# Patient Record
Sex: FEMALE | Race: WHITE | NOT HISPANIC OR LATINO | Employment: FULL TIME | URBAN - METROPOLITAN AREA
[De-identification: names, ages, dates, MRNs, and addresses within clinical notes are randomized per-mention and may not be internally consistent; named-entity substitution may affect disease eponyms.]

---

## 2021-07-29 ENCOUNTER — OFFICE VISIT (OUTPATIENT)
Dept: GASTROENTEROLOGY | Facility: CLINIC | Age: 31
End: 2021-07-29

## 2021-07-29 ENCOUNTER — LAB (OUTPATIENT)
Dept: LAB | Facility: HOSPITAL | Age: 31
End: 2021-07-29

## 2021-07-29 VITALS
HEIGHT: 64 IN | TEMPERATURE: 97.3 F | SYSTOLIC BLOOD PRESSURE: 126 MMHG | BODY MASS INDEX: 37.59 KG/M2 | DIASTOLIC BLOOD PRESSURE: 68 MMHG | HEART RATE: 99 BPM | WEIGHT: 220.2 LBS | OXYGEN SATURATION: 98 %

## 2021-07-29 DIAGNOSIS — K62.89 CHRONIC IDIOPATHIC ANAL PAIN: ICD-10-CM

## 2021-07-29 DIAGNOSIS — Z98.890 HISTORY OF BOWEL DIVERSION SURGERY: ICD-10-CM

## 2021-07-29 DIAGNOSIS — K52.9 CHRONIC DIARRHEA: ICD-10-CM

## 2021-07-29 DIAGNOSIS — Z98.890 PERSONAL HISTORY OF SURGERY TO HEART AND GREAT VESSELS, PRESENTING HAZARDS TO HEALTH: ICD-10-CM

## 2021-07-29 DIAGNOSIS — G89.29 CHRONIC IDIOPATHIC ANAL PAIN: ICD-10-CM

## 2021-07-29 DIAGNOSIS — R10.9 CHRONIC ABDOMINAL PAIN: ICD-10-CM

## 2021-07-29 DIAGNOSIS — G89.29 CHRONIC ABDOMINAL PAIN: ICD-10-CM

## 2021-07-29 DIAGNOSIS — R10.9 CHRONIC ABDOMINAL PAIN: Primary | ICD-10-CM

## 2021-07-29 DIAGNOSIS — G89.29 CHRONIC ABDOMINAL PAIN: Primary | ICD-10-CM

## 2021-07-29 DIAGNOSIS — R10.9 STOMACH ACHE: Primary | ICD-10-CM

## 2021-07-29 LAB
ALBUMIN SERPL-MCNC: 4.6 G/DL (ref 3.5–5.2)
ALBUMIN/GLOB SERPL: 1.3 G/DL
ALP SERPL-CCNC: 101 U/L (ref 39–117)
ALT SERPL W P-5'-P-CCNC: 11 U/L (ref 1–33)
ANION GAP SERPL CALCULATED.3IONS-SCNC: 9.1 MMOL/L (ref 5–15)
AST SERPL-CCNC: 12 U/L (ref 1–32)
BASOPHILS # BLD AUTO: 0.06 10*3/MM3 (ref 0–0.2)
BASOPHILS NFR BLD AUTO: 0.6 % (ref 0–1.5)
BILIRUB SERPL-MCNC: 0.4 MG/DL (ref 0–1.2)
BUN SERPL-MCNC: 9 MG/DL (ref 6–20)
BUN/CREAT SERPL: 9.3 (ref 7–25)
CALCIUM SPEC-SCNC: 9.2 MG/DL (ref 8.6–10.5)
CHLORIDE SERPL-SCNC: 107 MMOL/L (ref 98–107)
CO2 SERPL-SCNC: 22.9 MMOL/L (ref 22–29)
CREAT SERPL-MCNC: 0.97 MG/DL (ref 0.57–1)
CRP SERPL-MCNC: 0.37 MG/DL (ref 0–0.5)
DEPRECATED RDW RBC AUTO: 39.4 FL (ref 37–54)
EOSINOPHIL # BLD AUTO: 0.15 10*3/MM3 (ref 0–0.4)
EOSINOPHIL NFR BLD AUTO: 1.5 % (ref 0.3–6.2)
ERYTHROCYTE [DISTWIDTH] IN BLOOD BY AUTOMATED COUNT: 12 % (ref 12.3–15.4)
GFR SERPL CREATININE-BSD FRML MDRD: 67 ML/MIN/1.73
GLOBULIN UR ELPH-MCNC: 3.5 GM/DL
GLUCOSE SERPL-MCNC: 80 MG/DL (ref 65–99)
HBV SURFACE AG SERPL QL IA: NORMAL
HCT VFR BLD AUTO: 49.4 % (ref 34–46.6)
HGB BLD-MCNC: 16.3 G/DL (ref 12–15.9)
IMM GRANULOCYTES # BLD AUTO: 0.03 10*3/MM3 (ref 0–0.05)
IMM GRANULOCYTES NFR BLD AUTO: 0.3 % (ref 0–0.5)
LYMPHOCYTES # BLD AUTO: 2.99 10*3/MM3 (ref 0.7–3.1)
LYMPHOCYTES NFR BLD AUTO: 30.9 % (ref 19.6–45.3)
MCH RBC QN AUTO: 29.6 PG (ref 26.6–33)
MCHC RBC AUTO-ENTMCNC: 33 G/DL (ref 31.5–35.7)
MCV RBC AUTO: 89.8 FL (ref 79–97)
MONOCYTES # BLD AUTO: 0.52 10*3/MM3 (ref 0.1–0.9)
MONOCYTES NFR BLD AUTO: 5.4 % (ref 5–12)
NEUTROPHILS NFR BLD AUTO: 5.93 10*3/MM3 (ref 1.7–7)
NEUTROPHILS NFR BLD AUTO: 61.3 % (ref 42.7–76)
NRBC BLD AUTO-RTO: 0 /100 WBC (ref 0–0.2)
PLATELET # BLD AUTO: 328 10*3/MM3 (ref 140–450)
PMV BLD AUTO: 9.8 FL (ref 6–12)
POTASSIUM SERPL-SCNC: 3.9 MMOL/L (ref 3.5–5.2)
PROT SERPL-MCNC: 8.1 G/DL (ref 6–8.5)
RBC # BLD AUTO: 5.5 10*6/MM3 (ref 3.77–5.28)
SODIUM SERPL-SCNC: 139 MMOL/L (ref 136–145)
WBC # BLD AUTO: 9.68 10*3/MM3 (ref 3.4–10.8)

## 2021-07-29 PROCEDURE — 86682 HELMINTH ANTIBODY: CPT

## 2021-07-29 PROCEDURE — 86140 C-REACTIVE PROTEIN: CPT

## 2021-07-29 PROCEDURE — 85025 COMPLETE CBC W/AUTO DIFF WBC: CPT

## 2021-07-29 PROCEDURE — 86255 FLUORESCENT ANTIBODY SCREEN: CPT

## 2021-07-29 PROCEDURE — 83516 IMMUNOASSAY NONANTIBODY: CPT

## 2021-07-29 PROCEDURE — 82607 VITAMIN B-12: CPT

## 2021-07-29 PROCEDURE — 87340 HEPATITIS B SURFACE AG IA: CPT

## 2021-07-29 PROCEDURE — 99204 OFFICE O/P NEW MOD 45 MIN: CPT | Performed by: INTERNAL MEDICINE

## 2021-07-29 PROCEDURE — 87385 HISTOPLASMA CAPSUL AG IA: CPT

## 2021-07-29 PROCEDURE — 86480 TB TEST CELL IMMUN MEASURE: CPT

## 2021-07-29 PROCEDURE — 86671 FUNGUS NES ANTIBODY: CPT

## 2021-07-29 PROCEDURE — 36415 COLL VENOUS BLD VENIPUNCTURE: CPT

## 2021-07-29 PROCEDURE — 86704 HEP B CORE ANTIBODY TOTAL: CPT

## 2021-07-29 PROCEDURE — 81335 TPMT GENE COM VARIANTS: CPT

## 2021-07-29 PROCEDURE — 80053 COMPREHEN METABOLIC PANEL: CPT

## 2021-07-29 RX ORDER — PRENATAL VIT NO.126/IRON/FOLIC 28MG-0.8MG
TABLET ORAL DAILY
COMMUNITY

## 2021-07-29 NOTE — PROGRESS NOTES
PCP: Sybil Watters MD    Chief Complaint   Patient presents with   • Abdominal Pain     Lower Abd Pain   • Nausea   • Diarrhea        History of Present Illness:   Corrine Kaplan is a 30 y.o. female who presents to the GI clinic as new patient self referral for GI symptoms. She has a h/o ileocectomy in [place] Prior to 2016 (surgeon has since passed away). She was told the pathology was suggestive for crohn's disease.  She followed with dr. Rogers and had an egd/colonoscopy around 2017 showing normal post operative anatomy.    She reports worsening epigastric abdominal pain more with size/consistency of foods.  She tolerates protein shakes.  She does have nausea. Minimal wt loss but wt has been fluctuating related to pregnancy.  No gib loss. Has post prandial liquid stools.  No fever. No h/o malignancy. Has three children.     Past Medical History:   Diagnosis Date   • Colon polyp        Past Surgical History:   Procedure Laterality Date   • APPENDECTOMY     • CHOLECYSTECTOMY     • COLONOSCOPY           Current Outpatient Medications:   •  prenatal vitamin (prenatal, CLASSIC, vitamin) tablet, Take  by mouth Daily., Disp: , Rfl:     No Known Allergies    Family History   Problem Relation Age of Onset   • Colon cancer Neg Hx    • Colon polyps Neg Hx    • Esophageal cancer Neg Hx        Social History     Socioeconomic History   • Marital status: Single     Spouse name: Not on file   • Number of children: Not on file   • Years of education: Not on file   • Highest education level: Not on file   Tobacco Use   • Smoking status: Never Smoker   • Smokeless tobacco: Never Used   Vaping Use   • Vaping Use: Never used   Substance and Sexual Activity   • Alcohol use: Yes       Review of Systems  All other systems reviewed and are negative.  A complete 12 point ros was asked and is negative except for that mentioned above.  In particular:  No fever  No rash  No increased arthralgias  No worsening edema  No cough  No  dyspnea  No chest pain    Vitals:    07/29/21 1546   BP: 126/68   Pulse: 99   Temp: 97.3 °F (36.3 °C)   SpO2: 98%       Physical Exam  General Appearance:  Vitals as above. no acute distress  Head/face:  Normocephalic, atraumatic  Eyes:   EOMI, no conjunctivitis or icterus   Nose/Sinuses:  Nares patent bilaterally without discharge or lesions  Mouth/Throat:  Normal oral movements without dyskinesia  Neck:  trachea is midline, no thyromegaly  Lungs:  Normal work of breathing effort, no overt rales  Heart:  No overt JVD or type VI murmur  Abdomen:  Nondistended, no guarding or rebound tenderness  Neurologic:  Alert; no focal deficits; age appropriate behavior and speech  Psychiatric: mood and affect are congruent  Vascular: extremities without edema  Skin: no rash or cyanosis.  High bmi    Assessment/Plan  1.) History of small bowel resection, suspected crohn's disease  Dr. Christensen(? Spelling) at [facility].    Would like to acquire pathology report    2.) Chronic post prandial abdominal pain, consistency based  Consistency of food hints at possible obstructive symptoms, differential includes motility disturbance/ibs    Plan:  MREnterography to rule out small bowel stricture/anastomosis stricture    3.) Chronic diarrhea  Recommend egd/colonoscopy and cbc, cmp, crp as well as crohn's serology  Differential includes active IBD vs IBS/functional.      Santy Estrella MD  7/29/2021

## 2021-07-30 LAB — VIT B12 BLD-MCNC: 340 PG/ML (ref 211–946)

## 2021-07-31 LAB — HBV CORE AB SERPL QL IA: NEGATIVE

## 2021-08-02 LAB
BAKER'S YEAST IGG QN IA: 15 UNITS (ref 0–50)
CHITOBIOSIDE IGA SERPL IA-ACNC: 7 UNITS (ref 0–90)
GAMMA INTERFERON BACKGROUND BLD IA-ACNC: 0.16 IU/ML
H CAPSUL AG UR QL IA: <0.5
LABORATORY COMMENT REPORT: NORMAL
LAMINARIBIOSIDE IGG SERPL IA-ACNC: 23 UNITS (ref 0–60)
Lab: NORMAL
M TB IFN-G BLD-IMP: NEGATIVE
M TB IFN-G CD4+ BCKGRND COR BLD-ACNC: 0.13 IU/ML
M TB IFN-G CD4+CD8+ BCKGRND COR BLD-ACNC: 0.16 IU/ML
MANNOBIOSIDE IGG SERPL IA-ACNC: 53 UNITS (ref 0–100)
MITOGEN IGNF BLD-ACNC: >10 IU/ML
P-ANCA ATYPICAL SER QL IF: NEGATIVE
SERVICE CMNT-IMP: NORMAL

## 2021-08-03 LAB — STRONGYLOIDES IGG SER QL IA: NEGATIVE

## 2021-08-04 LAB
DIAGNOSTIC IMP SPEC-IMP: NORMAL
LABORATORY COMMENT REPORT: NORMAL
TPMT GENE MUT ANL BLD/T: NORMAL
TPMT GENE MUT ANL BLD/T: NORMAL

## 2021-08-23 DIAGNOSIS — Z12.11 ENCOUNTER FOR SCREENING COLONOSCOPY: Primary | ICD-10-CM

## 2021-08-30 ENCOUNTER — APPOINTMENT (OUTPATIENT)
Dept: PREADMISSION TESTING | Facility: HOSPITAL | Age: 31
End: 2021-08-30

## 2021-08-30 LAB — SARS-COV-2 RNA PNL SPEC NAA+PROBE: NOT DETECTED

## 2021-08-30 PROCEDURE — C9803 HOPD COVID-19 SPEC COLLECT: HCPCS

## 2021-08-30 PROCEDURE — U0004 COV-19 TEST NON-CDC HGH THRU: HCPCS

## 2021-09-02 ENCOUNTER — OUTSIDE FACILITY SERVICE (OUTPATIENT)
Dept: GASTROENTEROLOGY | Facility: CLINIC | Age: 31
End: 2021-09-02

## 2021-09-02 PROCEDURE — 43239 EGD BIOPSY SINGLE/MULTIPLE: CPT | Performed by: INTERNAL MEDICINE

## 2021-09-02 PROCEDURE — 45380 COLONOSCOPY AND BIOPSY: CPT | Performed by: INTERNAL MEDICINE

## 2021-09-02 PROCEDURE — 88305 TISSUE EXAM BY PATHOLOGIST: CPT | Performed by: INTERNAL MEDICINE

## 2021-09-02 RX ORDER — PANTOPRAZOLE SODIUM 40 MG/1
40 TABLET, DELAYED RELEASE ORAL DAILY
Qty: 90 TABLET | Refills: 3 | Status: SHIPPED | OUTPATIENT
Start: 2021-09-02 | End: 2022-09-27

## 2021-09-02 RX ORDER — BUDESONIDE 3 MG/1
9 CAPSULE, COATED PELLETS ORAL DAILY
Qty: 90 CAPSULE | Refills: 3 | Status: SHIPPED | OUTPATIENT
Start: 2021-09-02 | End: 2021-10-19

## 2021-09-03 ENCOUNTER — LAB REQUISITION (OUTPATIENT)
Dept: LAB | Facility: HOSPITAL | Age: 31
End: 2021-09-03

## 2021-09-03 DIAGNOSIS — K63.89 OTHER SPECIFIED DISEASES OF INTESTINE: ICD-10-CM

## 2021-09-03 DIAGNOSIS — R19.7 DIARRHEA, UNSPECIFIED: ICD-10-CM

## 2021-09-03 DIAGNOSIS — R10.9 UNSPECIFIED ABDOMINAL PAIN: ICD-10-CM

## 2021-09-03 DIAGNOSIS — K64.8 OTHER HEMORRHOIDS: ICD-10-CM

## 2021-09-07 LAB
CYTO UR: NORMAL
LAB AP CASE REPORT: NORMAL
LAB AP CLINICAL INFORMATION: NORMAL
PATH REPORT.FINAL DX SPEC: NORMAL
PATH REPORT.GROSS SPEC: NORMAL

## 2021-09-08 ENCOUNTER — TELEPHONE (OUTPATIENT)
Dept: GASTROENTEROLOGY | Facility: CLINIC | Age: 31
End: 2021-09-08

## 2021-09-08 NOTE — TELEPHONE ENCOUNTER
Ms Eaton called back. Biopsy results given. Bradly will reschedule follow up for 8 weeks for colonoscopy results out from 9/8/2021. Cancelled 9/23/2021 with Dr Estrella.

## 2021-10-05 ENCOUNTER — HOSPITAL ENCOUNTER (OUTPATIENT)
Dept: MRI IMAGING | Facility: HOSPITAL | Age: 31
Discharge: HOME | End: 2021-10-05
Admitting: INTERNAL MEDICINE

## 2021-10-05 DIAGNOSIS — K52.9 CHRONIC DIARRHEA: ICD-10-CM

## 2021-10-05 DIAGNOSIS — R10.9 CHRONIC ABDOMINAL PAIN: ICD-10-CM

## 2021-10-05 DIAGNOSIS — Z98.890 HISTORY OF BOWEL DIVERSION SURGERY: ICD-10-CM

## 2021-10-05 DIAGNOSIS — G89.29 CHRONIC ABDOMINAL PAIN: ICD-10-CM

## 2021-10-05 PROCEDURE — 74183 MRI ABD W/O CNTR FLWD CNTR: CPT

## 2021-10-05 PROCEDURE — 0 GADOBENATE DIMEGLUMINE 529 MG/ML SOLUTION: Performed by: INTERNAL MEDICINE

## 2021-10-05 PROCEDURE — A9577 INJ MULTIHANCE: HCPCS | Performed by: INTERNAL MEDICINE

## 2021-10-05 PROCEDURE — 72197 MRI PELVIS W/O & W/DYE: CPT

## 2021-10-05 PROCEDURE — 25010000002 GLUCAGON (HUMAN RECOMBINANT) 1 MG RECONSTITUTED SOLUTION: Performed by: INTERNAL MEDICINE

## 2021-10-05 RX ADMIN — GLUCAGON HYDROCHLORIDE 2 MG: 1 INJECTION, POWDER, FOR SOLUTION INTRAMUSCULAR; INTRAVENOUS; SUBCUTANEOUS at 10:07

## 2021-10-05 RX ADMIN — GADOBENATE DIMEGLUMINE 20 ML: 529 INJECTION, SOLUTION INTRAVENOUS at 11:14

## 2021-10-19 ENCOUNTER — OFFICE VISIT (OUTPATIENT)
Dept: GASTROENTEROLOGY | Facility: CLINIC | Age: 31
End: 2021-10-19

## 2021-10-19 VITALS
OXYGEN SATURATION: 99 % | TEMPERATURE: 97.8 F | SYSTOLIC BLOOD PRESSURE: 134 MMHG | WEIGHT: 220 LBS | BODY MASS INDEX: 37.56 KG/M2 | DIASTOLIC BLOOD PRESSURE: 76 MMHG | HEART RATE: 86 BPM | HEIGHT: 64 IN

## 2021-10-19 DIAGNOSIS — K52.9 CHRONIC DIARRHEA: Primary | ICD-10-CM

## 2021-10-19 PROCEDURE — 99214 OFFICE O/P EST MOD 30 MIN: CPT | Performed by: INTERNAL MEDICINE

## 2021-10-19 RX ORDER — MESALAMINE 0.38 G/1
1.5 CAPSULE, EXTENDED RELEASE ORAL DAILY
Qty: 180 CAPSULE | Refills: 3 | Status: SHIPPED | OUTPATIENT
Start: 2021-10-19 | End: 2022-07-08

## 2021-10-19 RX ORDER — VORTIOXETINE 10 MG/1
TABLET, FILM COATED ORAL
COMMUNITY
Start: 2021-10-15

## 2021-10-19 RX ORDER — CITALOPRAM 20 MG/1
TABLET ORAL
COMMUNITY
Start: 2021-09-23

## 2021-10-19 RX ORDER — DICYCLOMINE HYDROCHLORIDE 10 MG/1
10 CAPSULE ORAL 3 TIMES DAILY PRN
Qty: 180 CAPSULE | Refills: 3 | Status: SHIPPED | OUTPATIENT
Start: 2021-10-19

## 2021-10-19 NOTE — PROGRESS NOTES
PCP: Isela Dias MD    Chief Complaint   Patient presents with   • Follow-up     follow up on EGD and Colonoscopy   • Nausea   • Abdominal Pain       History of Present Illness:   Corrine Eaton is a 30 y.o. female who presents to GI clinic as a follow up for suspected crohn's disease. H/o right hemicolectomy by retired physician with inability to acquire pathology. Reported inflammatory mass. She did have 6 apthous ulcers but does not feel better on budesonide. Denies taking ibuprofen or other nsaids.  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 Stress possibly contributing. Having reversion of diarrhea multilple times a day to maybe 1 x a week.  No gib loss.    Past Medical History:   Diagnosis Date   • Colon polyp        Past Surgical History:   Procedure Laterality Date   • APPENDECTOMY     • CHOLECYSTECTOMY     • COLONOSCOPY           Current Outpatient Medications:   •  Budesonide (ENTOCORT EC) 3 MG 24 hr capsule, Take 3 capsules by mouth Daily for 90 days., Disp: 90 capsule, Rfl: 3  •  citalopram (CeleXA) 20 MG tablet, , Disp: , Rfl:   •  pantoprazole (PROTONIX) 40 MG EC tablet, Take 1 tablet by mouth Daily., Disp: 90 tablet, Rfl: 3  •  prenatal vitamin (prenatal, CLASSIC, vitamin) tablet, Take  by mouth Daily., Disp: , Rfl:   •  Trintellix 10 MG tablet, , Disp: , Rfl:     No Known Allergies    Family History   Problem Relation Age of Onset   • Colon cancer Neg Hx    • Colon polyps Neg Hx    • Esophageal cancer Neg Hx        Social History     Socioeconomic History   • Marital status: Single   Tobacco Use   • Smoking status: Never Smoker   • Smokeless tobacco: Never Used   Vaping Use   • Vaping Use: Never used   Substance and Sexual Activity   • Alcohol use: Yes       Review of Systems  A complete 12 point ros was asked and is negative except for that mentioned above.  In particular:  No fever  No rash  No increased arthralgias  No worsening edema  No cough  No dyspnea  No chest pain      Vitals:    10/19/21 1316   BP:  134/76   Pulse: 86   Temp: 97.8 °F (36.6 °C)   SpO2: 99%       Physical Exam  General: well developed, well nourished  A+O x 3 NAD  HEENT: NCAT, pupils equal appearing, sclera appear white  NECK: full ROM  Respiratory: symmetric chest rise, normal effort, normal work of breathing, no overt rales  Abomen: non-distended  Skin: normal color, no jaundice  Neuro: no tremor, no facial droop  Psych: normal mood and affect      Assessment/Plan  1.) Suspect Crohn's disease  2.) Chronic diarrhea  3.) Chronic abdominal pain  Workup: colonoscopy 9/2021 with 6 apthous ulcers, MRE normal 2021 without small bowel disease  Cbc, cmp crp: wnl.  H/o surgical extraction of suspected inflammatory crohn's.  No nsaid use    Plan:  Budesonide has not helped. Getting clues of both inflammatory and irritability. Will change budesonide to mesalamine and she was educated on risk of AIN.  Will assess anti tnf and renal labs in 2 weeks. Will assess stool for inflammation and place on bentyl qid x 2 weeks then prn.  If persistent discomfort and labs favoring inflammation, may consider stepping up to remicade.       Santy Estrella MD  10/19/2021

## 2021-11-29 RX ORDER — BUDESONIDE 3 MG/1
9 CAPSULE, COATED PELLETS ORAL DAILY
Qty: 270 CAPSULE | Refills: 1 | OUTPATIENT
Start: 2021-11-29 | End: 2022-02-27

## 2021-11-30 ENCOUNTER — TELEPHONE (OUTPATIENT)
Dept: GASTROENTEROLOGY | Facility: CLINIC | Age: 31
End: 2021-11-30

## 2021-11-30 DIAGNOSIS — K52.9 CHRONIC DIARRHEA: Primary | ICD-10-CM

## 2021-11-30 RX ORDER — METRONIDAZOLE 500 MG/1
500 TABLET ORAL 3 TIMES DAILY
Qty: 30 TABLET | Refills: 0 | Status: SHIPPED | OUTPATIENT
Start: 2021-11-30 | End: 2022-06-30

## 2021-12-22 ENCOUNTER — PRIOR AUTHORIZATION (OUTPATIENT)
Dept: GASTROENTEROLOGY | Facility: CLINIC | Age: 31
End: 2021-12-22

## 2021-12-22 ENCOUNTER — LAB (OUTPATIENT)
Dept: LAB | Facility: HOSPITAL | Age: 31
End: 2021-12-22

## 2021-12-22 DIAGNOSIS — K52.9 CHRONIC DIARRHEA: ICD-10-CM

## 2021-12-22 PROCEDURE — 83993 ASSAY FOR CALPROTECTIN FECAL: CPT

## 2021-12-22 PROCEDURE — 0097U HC BIOFIRE FILMARRAY GI PANEL: CPT

## 2021-12-23 ENCOUNTER — TELEPHONE (OUTPATIENT)
Dept: GASTROENTEROLOGY | Facility: CLINIC | Age: 31
End: 2021-12-23

## 2021-12-23 LAB
ADV 40+41 DNA STL QL NAA+NON-PROBE: NOT DETECTED
ASTRO TYP 1-8 RNA STL QL NAA+NON-PROBE: NOT DETECTED
C CAYETANENSIS DNA STL QL NAA+NON-PROBE: NOT DETECTED
C COLI+JEJ+UPSA DNA STL QL NAA+NON-PROBE: NOT DETECTED
CRYPTOSP DNA STL QL NAA+NON-PROBE: NOT DETECTED
E HISTOLYT DNA STL QL NAA+NON-PROBE: NOT DETECTED
EAEC PAA PLAS AGGR+AATA ST NAA+NON-PRB: NOT DETECTED
EC STX1+STX2 GENES STL QL NAA+NON-PROBE: NOT DETECTED
EPEC EAE GENE STL QL NAA+NON-PROBE: NOT DETECTED
ETEC LTA+ST1A+ST1B TOX ST NAA+NON-PROBE: NOT DETECTED
G LAMBLIA DNA STL QL NAA+NON-PROBE: NOT DETECTED
NOROVIRUS GI+II RNA STL QL NAA+NON-PROBE: NOT DETECTED
P SHIGELLOIDES DNA STL QL NAA+NON-PROBE: NOT DETECTED
RVA RNA STL QL NAA+NON-PROBE: DETECTED
S ENT+BONG DNA STL QL NAA+NON-PROBE: NOT DETECTED
SAPO I+II+IV+V RNA STL QL NAA+NON-PROBE: NOT DETECTED
SHIGELLA SP+EIEC IPAH ST NAA+NON-PROBE: NOT DETECTED
V CHOL+PARA+VUL DNA STL QL NAA+NON-PROBE: NOT DETECTED
V CHOLERAE DNA STL QL NAA+NON-PROBE: NOT DETECTED
Y ENTEROCOL DNA STL QL NAA+NON-PROBE: NOT DETECTED

## 2021-12-28 LAB — CALPROTECTIN STL-MCNT: <16 UG/G (ref 0–120)

## 2022-01-18 ENCOUNTER — OFFICE VISIT (OUTPATIENT)
Dept: GASTROENTEROLOGY | Facility: CLINIC | Age: 32
End: 2022-01-18

## 2022-01-18 VITALS
TEMPERATURE: 98 F | HEIGHT: 64 IN | BODY MASS INDEX: 38.28 KG/M2 | DIASTOLIC BLOOD PRESSURE: 62 MMHG | WEIGHT: 224.2 LBS | SYSTOLIC BLOOD PRESSURE: 122 MMHG | OXYGEN SATURATION: 98 % | HEART RATE: 92 BPM

## 2022-01-18 DIAGNOSIS — R10.9 CHRONIC ABDOMINAL PAIN: ICD-10-CM

## 2022-01-18 DIAGNOSIS — Z12.11 ENCOUNTER FOR SCREENING COLONOSCOPY: Primary | ICD-10-CM

## 2022-01-18 DIAGNOSIS — G89.29 CHRONIC ABDOMINAL PAIN: ICD-10-CM

## 2022-01-18 PROBLEM — F33.1 MODERATE EPISODE OF RECURRENT MAJOR DEPRESSIVE DISORDER: Status: ACTIVE | Noted: 2021-10-05

## 2022-01-18 PROCEDURE — 99214 OFFICE O/P EST MOD 30 MIN: CPT | Performed by: INTERNAL MEDICINE

## 2022-01-18 NOTE — PROGRESS NOTES
PCP: Isela Dias MD    Chief Complaint   Patient presents with   • Follow-up     Chronic Diarrhea        History of Present Illness:   Corrine Eaton is a 31 y.o. female who presents to GI clinic as a follow up for ibs and suspected crohn's disease. Since last visit, she was placed on bentyl prn, mesalamine and an MRE was performed along with stool studies. Stool studies + rotavirus, mre without small bowel stricture, mild rectal wall thickening.  She states she had less abdominal cramping on low fodmap diet but more liquid stool. She would typically not have a bm every day on regular diet.  No gib loss. No fever    Past Medical History:   Diagnosis Date   • Colon polyp        Past Surgical History:   Procedure Laterality Date   • APPENDECTOMY     • CHOLECYSTECTOMY     • COLONOSCOPY           Current Outpatient Medications:   •  mesalamine (APRISO) 0.375 g 24 hr capsule, Take 4 capsules by mouth Daily., Disp: 180 capsule, Rfl: 3  •  pantoprazole (PROTONIX) 40 MG EC tablet, Take 1 tablet by mouth Daily., Disp: 90 tablet, Rfl: 3  •  Trintellix 10 MG tablet, , Disp: , Rfl:   •  citalopram (CeleXA) 20 MG tablet, , Disp: , Rfl:   •  dicyclomine (Bentyl) 10 MG capsule, Take 1 capsule by mouth 3 (Three) Times a Day As Needed (abdominal pain)., Disp: 180 capsule, Rfl: 3  •  metroNIDAZOLE (FLAGYL) 500 MG tablet, Take 1 tablet by mouth 3 (Three) Times a Day., Disp: 30 tablet, Rfl: 0  •  prenatal vitamin (prenatal, CLASSIC, vitamin) tablet, Take  by mouth Daily., Disp: , Rfl:     No Known Allergies    Family History   Problem Relation Age of Onset   • Colon cancer Neg Hx    • Colon polyps Neg Hx    • Esophageal cancer Neg Hx        Social History     Socioeconomic History   • Marital status: Single   Tobacco Use   • Smoking status: Never Smoker   • Smokeless tobacco: Never Used   Vaping Use   • Vaping Use: Never used   Substance and Sexual Activity   • Alcohol use: Yes       Review of Systems  A complete 12 point ros was  asked and is negative except for that mentioned above.  In particular:  No fever  No rash  No increased arthralgias  No worsening edema  No cough  No dyspnea  No chest pain      Vitals:    01/18/22 1503   BP: 122/62   Pulse: 92   Temp: 98 °F (36.7 °C)   SpO2: 98%       Physical Exam  General: well developed, well nourished  A+O x 3 NAD  HEENT: NCAT, pupils equal appearing, sclera appear white  NECK: full ROM  Respiratory: symmetric chest rise, normal effort, normal work of breathing, no overt rales  Abomen: non-distended  Skin: normal color, no jaundice  Neuro: no tremor, no facial droop  Psych: normal mood and affect      Assessment/Plan  1.) Suspect Crohn's disease  2.) Chronic diarrhea  3.) Chronic abdominal pain  Workup: colonoscopy 9/2021 with 6 apthous ulcers, MRE normal 2021 without small bowel disease  Cbc, cmp crp: wnl.  H/o surgical extraction of suspected inflammatory crohn's.  No nsaid use  - low fodmap diet helped crampy pain but she had more liquid bms  - has periods of no bm on regular diet    Plan:  -discontinue bentyl  - go back to regular diet  - not enough inflammatory data to warrant step up therapy and I suspect more IBS at present. Will send bowel prep and start daily miralax titration. She voiced understanding.    Santy Estrella MD  1/18/2022

## 2022-03-14 ENCOUNTER — TELEPHONE (OUTPATIENT)
Dept: GASTROENTEROLOGY | Facility: CLINIC | Age: 32
End: 2022-03-14

## 2022-03-14 ENCOUNTER — APPOINTMENT (OUTPATIENT)
Dept: CT IMAGING | Facility: HOSPITAL | Age: 32
End: 2022-03-14

## 2022-03-14 ENCOUNTER — HOSPITAL ENCOUNTER (EMERGENCY)
Facility: HOSPITAL | Age: 32
Discharge: HOME | End: 2022-03-14
Attending: STUDENT IN AN ORGANIZED HEALTH CARE EDUCATION/TRAINING PROGRAM | Admitting: STUDENT IN AN ORGANIZED HEALTH CARE EDUCATION/TRAINING PROGRAM

## 2022-03-14 VITALS
RESPIRATION RATE: 18 BRPM | SYSTOLIC BLOOD PRESSURE: 129 MMHG | DIASTOLIC BLOOD PRESSURE: 96 MMHG | HEIGHT: 64 IN | HEART RATE: 107 BPM | WEIGHT: 220 LBS | TEMPERATURE: 97.8 F | BODY MASS INDEX: 37.56 KG/M2 | OXYGEN SATURATION: 100 %

## 2022-03-14 DIAGNOSIS — R19.7 DIARRHEA, UNSPECIFIED TYPE: ICD-10-CM

## 2022-03-14 DIAGNOSIS — R10.9 NONSPECIFIC ABDOMINAL PAIN: Primary | ICD-10-CM

## 2022-03-14 DIAGNOSIS — D72.829 LEUKOCYTOSIS, UNSPECIFIED TYPE: ICD-10-CM

## 2022-03-14 DIAGNOSIS — R11.2 NON-INTRACTABLE VOMITING WITH NAUSEA, UNSPECIFIED VOMITING TYPE: ICD-10-CM

## 2022-03-14 DIAGNOSIS — K52.9 GASTROENTERITIS: ICD-10-CM

## 2022-03-14 LAB
ALBUMIN SERPL-MCNC: 4.5 G/DL (ref 3.5–5.2)
ALBUMIN/GLOB SERPL: 1.4 G/DL
ALP SERPL-CCNC: 84 U/L (ref 39–117)
ALT SERPL W P-5'-P-CCNC: 10 U/L (ref 1–33)
ANION GAP SERPL CALCULATED.3IONS-SCNC: 12 MMOL/L (ref 5–15)
AST SERPL-CCNC: 14 U/L (ref 1–32)
BACTERIA UR QL AUTO: ABNORMAL /HPF
BASOPHILS # BLD AUTO: 0.07 10*3/MM3 (ref 0–0.2)
BASOPHILS NFR BLD AUTO: 0.4 % (ref 0–1.5)
BILIRUB SERPL-MCNC: 0.4 MG/DL (ref 0–1.2)
BILIRUB UR QL STRIP: NEGATIVE
BUN SERPL-MCNC: 15 MG/DL (ref 6–20)
BUN/CREAT SERPL: 16.7 (ref 7–25)
CALCIUM SPEC-SCNC: 9.1 MG/DL (ref 8.6–10.5)
CHLORIDE SERPL-SCNC: 105 MMOL/L (ref 98–107)
CLARITY UR: ABNORMAL
CO2 SERPL-SCNC: 23 MMOL/L (ref 22–29)
COLOR UR: YELLOW
CREAT SERPL-MCNC: 0.9 MG/DL (ref 0.57–1)
D-LACTATE SERPL-SCNC: 1.2 MMOL/L (ref 0.5–2)
DEPRECATED RDW RBC AUTO: 39.9 FL (ref 37–54)
EGFRCR SERPLBLD CKD-EPI 2021: 87.3 ML/MIN/1.73
EOSINOPHIL # BLD AUTO: 0.14 10*3/MM3 (ref 0–0.4)
EOSINOPHIL NFR BLD AUTO: 0.8 % (ref 0.3–6.2)
ERYTHROCYTE [DISTWIDTH] IN BLOOD BY AUTOMATED COUNT: 12.3 % (ref 12.3–15.4)
GLOBULIN UR ELPH-MCNC: 3.3 GM/DL
GLUCOSE SERPL-MCNC: 131 MG/DL (ref 65–99)
GLUCOSE UR STRIP-MCNC: NEGATIVE MG/DL
HCT VFR BLD AUTO: 46.4 % (ref 34–46.6)
HGB BLD-MCNC: 15.8 G/DL (ref 12–15.9)
HGB UR QL STRIP.AUTO: ABNORMAL
HOLD SPECIMEN: NORMAL
HYALINE CASTS UR QL AUTO: ABNORMAL /LPF
IMM GRANULOCYTES # BLD AUTO: 0.06 10*3/MM3 (ref 0–0.05)
IMM GRANULOCYTES NFR BLD AUTO: 0.3 % (ref 0–0.5)
KETONES UR QL STRIP: NEGATIVE
LEUKOCYTE ESTERASE UR QL STRIP.AUTO: ABNORMAL
LIPASE SERPL-CCNC: 53 U/L (ref 13–60)
LYMPHOCYTES # BLD AUTO: 1.45 10*3/MM3 (ref 0.7–3.1)
LYMPHOCYTES NFR BLD AUTO: 7.8 % (ref 19.6–45.3)
MCH RBC QN AUTO: 30.3 PG (ref 26.6–33)
MCHC RBC AUTO-ENTMCNC: 34.1 G/DL (ref 31.5–35.7)
MCV RBC AUTO: 88.9 FL (ref 79–97)
MONOCYTES # BLD AUTO: 1.03 10*3/MM3 (ref 0.1–0.9)
MONOCYTES NFR BLD AUTO: 5.5 % (ref 5–12)
NEUTROPHILS NFR BLD AUTO: 15.91 10*3/MM3 (ref 1.7–7)
NEUTROPHILS NFR BLD AUTO: 85.2 % (ref 42.7–76)
NITRITE UR QL STRIP: NEGATIVE
NRBC BLD AUTO-RTO: 0 /100 WBC (ref 0–0.2)
PH UR STRIP.AUTO: <=5 [PH] (ref 5–8)
PLATELET # BLD AUTO: 287 10*3/MM3 (ref 140–450)
PMV BLD AUTO: 9.3 FL (ref 6–12)
POTASSIUM SERPL-SCNC: 4.1 MMOL/L (ref 3.5–5.2)
PROT SERPL-MCNC: 7.8 G/DL (ref 6–8.5)
PROT UR QL STRIP: ABNORMAL
RBC # BLD AUTO: 5.22 10*6/MM3 (ref 3.77–5.28)
RBC # UR STRIP: ABNORMAL /HPF
REF LAB TEST METHOD: ABNORMAL
SODIUM SERPL-SCNC: 140 MMOL/L (ref 136–145)
SP GR UR STRIP: 1.06 (ref 1–1.03)
SQUAMOUS #/AREA URNS HPF: ABNORMAL /HPF
UROBILINOGEN UR QL STRIP: ABNORMAL
WBC # UR STRIP: ABNORMAL /HPF
WBC NRBC COR # BLD: 18.66 10*3/MM3 (ref 3.4–10.8)
WHOLE BLOOD HOLD SPECIMEN: NORMAL
WHOLE BLOOD HOLD SPECIMEN: NORMAL

## 2022-03-14 PROCEDURE — 99283 EMERGENCY DEPT VISIT LOW MDM: CPT

## 2022-03-14 PROCEDURE — 83605 ASSAY OF LACTIC ACID: CPT | Performed by: PHYSICIAN ASSISTANT

## 2022-03-14 PROCEDURE — 74177 CT ABD & PELVIS W/CONTRAST: CPT

## 2022-03-14 PROCEDURE — 83690 ASSAY OF LIPASE: CPT | Performed by: STUDENT IN AN ORGANIZED HEALTH CARE EDUCATION/TRAINING PROGRAM

## 2022-03-14 PROCEDURE — 81001 URINALYSIS AUTO W/SCOPE: CPT | Performed by: STUDENT IN AN ORGANIZED HEALTH CARE EDUCATION/TRAINING PROGRAM

## 2022-03-14 PROCEDURE — 96374 THER/PROPH/DIAG INJ IV PUSH: CPT

## 2022-03-14 PROCEDURE — 85025 COMPLETE CBC W/AUTO DIFF WBC: CPT | Performed by: STUDENT IN AN ORGANIZED HEALTH CARE EDUCATION/TRAINING PROGRAM

## 2022-03-14 PROCEDURE — 25010000002 IOPAMIDOL 61 % SOLUTION: Performed by: STUDENT IN AN ORGANIZED HEALTH CARE EDUCATION/TRAINING PROGRAM

## 2022-03-14 PROCEDURE — 80053 COMPREHEN METABOLIC PANEL: CPT | Performed by: STUDENT IN AN ORGANIZED HEALTH CARE EDUCATION/TRAINING PROGRAM

## 2022-03-14 PROCEDURE — 25010000002 ONDANSETRON PER 1 MG: Performed by: PHYSICIAN ASSISTANT

## 2022-03-14 RX ORDER — SODIUM CHLORIDE 9 MG/ML
10 INJECTION INTRAVENOUS AS NEEDED
Status: DISCONTINUED | OUTPATIENT
Start: 2022-03-14 | End: 2022-03-14 | Stop reason: HOSPADM

## 2022-03-14 RX ORDER — SODIUM CHLORIDE 0.9 % (FLUSH) 0.9 %
10 SYRINGE (ML) INJECTION AS NEEDED
Status: DISCONTINUED | OUTPATIENT
Start: 2022-03-14 | End: 2022-03-14 | Stop reason: HOSPADM

## 2022-03-14 RX ORDER — ONDANSETRON 2 MG/ML
4 INJECTION INTRAMUSCULAR; INTRAVENOUS ONCE
Status: COMPLETED | OUTPATIENT
Start: 2022-03-14 | End: 2022-03-14

## 2022-03-14 RX ADMIN — ONDANSETRON 4 MG: 2 INJECTION INTRAMUSCULAR; INTRAVENOUS at 01:36

## 2022-03-14 RX ADMIN — IOPAMIDOL 100 ML: 612 INJECTION, SOLUTION INTRAVENOUS at 01:51

## 2022-03-14 RX ADMIN — SODIUM CHLORIDE 1000 ML: 9 INJECTION, SOLUTION INTRAVENOUS at 01:36

## 2022-03-14 NOTE — DISCHARGE INSTRUCTIONS
Symptomatic care is recommended. Take all medications as prescribed and instructed. Follow up with your primary care and gastroenterology as directed or return to Emergency Department with worsening of symptoms.

## 2022-03-14 NOTE — TELEPHONE ENCOUNTER
Called and spoke with her. Avoid uncooked vegetables for now. Ok to use bentyl prn abdominal pain and if constipation occurs, will add miralax.

## 2022-03-14 NOTE — ED PROVIDER NOTES
[place]    EMERGENCY DEPARTMENT ENCOUNTER      Pt Name: Corrine Eaton  MRN: [id number]  YOB: 1990  Date of evaluation: 3/14/2022  Provider: MARYANNE Pittman    CHIEF COMPLAINT       Chief Complaint   Patient presents with   • Abdominal Pain         HISTORY OF PRESENT ILLNESS  (Location/Symptom, Timing/Onset, Context/Setting, Quality, Duration, Modifying Factors, Severity.)   Corrine Eaton is a 31 y.o. female who presents to the emergency department with complaints of abdominal pain in her right lower quadrant. Patient shares that at 10:15 pm this evening she began to experience a sharp pain in her right lower abdomen. Patient reports history of stomach issues and follows with Dr. Estrella with GI. Patient shares that when she went to bed this evening she continued to have short stabbing pains and nausea. Shares that this is different from her typical abdominal complaints. She also reports diarrhea all day. She has past medical history significant for a colonic resection due to a mass in addition to appendectomy and cholecystectomy. Patient shares she is not pregnant and has an IUD in place.    HPI   Nursing notes were reviewed.    REVIEW OF SYSTEMS    (2-9 systems for level 4, 10 or more for level 5)   Review of Systems   Constitutional: Positive for activity change. Negative for appetite change, chills and fever.   Respiratory: Negative.    Cardiovascular: Negative.    Gastrointestinal: Positive for abdominal pain, diarrhea and nausea. Negative for constipation and vomiting.   Genitourinary: Negative.         All systems reviewed and negative except for those discussed in HPI.   PAST MEDICAL HISTORY     Past Medical History:   Diagnosis Date   • Colon polyp          SURGICAL HISTORY       Past Surgical History:   Procedure Laterality Date   • APPENDECTOMY     • CHOLECYSTECTOMY     • COLONOSCOPY           CURRENT MEDICATIONS     No current facility-administered medications for this  encounter.    Current Outpatient Medications:   •  citalopram (CeleXA) 20 MG tablet, , Disp: , Rfl:   •  dicyclomine (Bentyl) 10 MG capsule, Take 1 capsule by mouth 3 (Three) Times a Day As Needed (abdominal pain)., Disp: 180 capsule, Rfl: 3  •  mesalamine (APRISO) 0.375 g 24 hr capsule, Take 4 capsules by mouth Daily., Disp: 180 capsule, Rfl: 3  •  metroNIDAZOLE (FLAGYL) 500 MG tablet, Take 1 tablet by mouth 3 (Three) Times a Day., Disp: 30 tablet, Rfl: 0  •  pantoprazole (PROTONIX) 40 MG EC tablet, Take 1 tablet by mouth Daily., Disp: 90 tablet, Rfl: 3  •  prenatal vitamin (prenatal, CLASSIC, vitamin) tablet, Take  by mouth Daily., Disp: , Rfl:   •  Trintellix 10 MG tablet, , Disp: , Rfl:     ALLERGIES     Patient has no known allergies.    FAMILY HISTORY       Family History   Problem Relation Age of Onset   • Colon cancer Neg Hx    • Colon polyps Neg Hx    • Esophageal cancer Neg Hx           SOCIAL HISTORY       Social History     Socioeconomic History   • Marital status: Single   Tobacco Use   • Smoking status: Never Smoker   • Smokeless tobacco: Never Used   Vaping Use   • Vaping Use: Never used   Substance and Sexual Activity   • Alcohol use: Yes         PHYSICAL EXAM    (up to 7 for level 4, 8 or more for level 5)   Physical Exam  Vitals and nursing note reviewed.   Constitutional:       General: She is not in acute distress.     Appearance: Normal appearance. She is well-developed. She is not ill-appearing or toxic-appearing.   HENT:      Head: Normocephalic and atraumatic.      Nose: Nose normal.      Mouth/Throat:      Mouth: Mucous membranes are moist.   Eyes:      Extraocular Movements: Extraocular movements intact.   Cardiovascular:      Rate and Rhythm: Normal rate and regular rhythm.   Pulmonary:      Effort: Pulmonary effort is normal. No respiratory distress.      Breath sounds: Normal breath sounds.   Abdominal:      General: Bowel sounds are normal. There is no distension.      Palpations:  Abdomen is soft.      Tenderness: There is abdominal tenderness in the right upper quadrant and right lower quadrant. There is no guarding or rebound.   Musculoskeletal:         General: Normal range of motion.      Cervical back: Normal range of motion and neck supple.   Skin:     General: Skin is warm and dry.   Neurological:      General: No focal deficit present.      Mental Status: She is alert.   Psychiatric:         Behavior: Behavior normal.         Thought Content: Thought content normal.         Judgment: Judgment normal.          DIAGNOSTIC RESULTS     EKG: All EKGs are interpreted by the Emergency Department Physician who either signs or Co-signs this chart in the absence of a cardiologist.    No orders to display       RADIOLOGY:   Non-plain film images such as CT, Ultrasound and MRI are read by the radiologist. Plain radiographic images are visualized and preliminarily interpreted by the emergency physician with the below findings:      [x] Radiologist's Report Reviewed:  CT Abdomen Pelvis With Contrast   Final Result      No acute process within the abdomen or pelvis.               Electronically signed by:  Reinaldo Perrin D.O.     3/14/2022 12:11 AM Mountain Time            ED BEDSIDE ULTRASOUND:   Performed by ED Physician - none    LABS:    I have reviewed and interpreted all of the currently available lab results from this visit (if applicable):  Results for orders placed or performed during the hospital encounter of 03/14/22   Comprehensive Metabolic Panel    Specimen: Blood   Result Value Ref Range    Glucose 131 (H) 65 - 99 mg/dL    BUN 15 6 - 20 mg/dL    Creatinine 0.90 0.57 - 1.00 mg/dL    Sodium 140 136 - 145 mmol/L    Potassium 4.1 3.5 - 5.2 mmol/L    Chloride 105 98 - 107 mmol/L    CO2 23.0 22.0 - 29.0 mmol/L    Calcium 9.1 8.6 - 10.5 mg/dL    Total Protein 7.8 6.0 - 8.5 g/dL    Albumin 4.50 3.50 - 5.20 g/dL    ALT (SGPT) 10 1 - 33 U/L    AST (SGOT) 14 1 - 32 U/L    Alkaline Phosphatase  84 39 - 117 U/L    Total Bilirubin 0.4 0.0 - 1.2 mg/dL    Globulin 3.3 gm/dL    A/G Ratio 1.4 g/dL    BUN/Creatinine Ratio 16.7 7.0 - 25.0    Anion Gap 12.0 5.0 - 15.0 mmol/L    eGFR 87.3 >60.0 mL/min/1.73   Lipase    Specimen: Blood   Result Value Ref Range    Lipase 53 13 - 60 U/L   Urinalysis With Microscopic If Indicated (No Culture) - Urine, Clean Catch    Specimen: Urine, Clean Catch   Result Value Ref Range    Color, UA Yellow Yellow, Straw    Appearance, UA Cloudy (A) Clear    pH, UA <=5.0 5.0 - 8.0    Specific Gravity, UA 1.064 (H) 1.001 - 1.030    Glucose, UA Negative Negative    Ketones, UA Negative Negative    Bilirubin, UA Negative Negative    Blood, UA Large (3+) (A) Negative    Protein, UA Trace (A) Negative    Leuk Esterase, UA Small (1+) (A) Negative    Nitrite, UA Negative Negative    Urobilinogen, UA 0.2 E.U./dL 0.2 - 1.0 E.U./dL   CBC Auto Differential    Specimen: Blood   Result Value Ref Range    WBC 18.66 (H) 3.40 - 10.80 10*3/mm3    RBC 5.22 3.77 - 5.28 10*6/mm3    Hemoglobin 15.8 12.0 - 15.9 g/dL    Hematocrit 46.4 34.0 - 46.6 %    MCV 88.9 79.0 - 97.0 fL    MCH 30.3 26.6 - 33.0 pg    MCHC 34.1 31.5 - 35.7 g/dL    RDW 12.3 12.3 - 15.4 %    RDW-SD 39.9 37.0 - 54.0 fl    MPV 9.3 6.0 - 12.0 fL    Platelets 287 140 - 450 10*3/mm3    Neutrophil % 85.2 (H) 42.7 - 76.0 %    Lymphocyte % 7.8 (L) 19.6 - 45.3 %    Monocyte % 5.5 5.0 - 12.0 %    Eosinophil % 0.8 0.3 - 6.2 %    Basophil % 0.4 0.0 - 1.5 %    Immature Grans % 0.3 0.0 - 0.5 %    Neutrophils, Absolute 15.91 (H) 1.70 - 7.00 10*3/mm3    Lymphocytes, Absolute 1.45 0.70 - 3.10 10*3/mm3    Monocytes, Absolute 1.03 (H) 0.10 - 0.90 10*3/mm3    Eosinophils, Absolute 0.14 0.00 - 0.40 10*3/mm3    Basophils, Absolute 0.07 0.00 - 0.20 10*3/mm3    Immature Grans, Absolute 0.06 (H) 0.00 - 0.05 10*3/mm3    nRBC 0.0 0.0 - 0.2 /100 WBC   Lactic Acid, Plasma    Specimen: Blood   Result Value Ref Range    Lactate 1.2 0.5 - 2.0 mmol/L   Urinalysis, Microscopic  "Only - Urine, Clean Catch    Specimen: Urine, Clean Catch   Result Value Ref Range    RBC, UA 13-20 (A) None Seen, 0-2 /HPF    WBC, UA 13-20 (A) None Seen, 0-2 /HPF    Bacteria, UA Trace None Seen, Trace /HPF    Squamous Epithelial Cells, UA 3-6 (A) None Seen, 0-2 /HPF    Hyaline Casts, UA 0-6 0 - 6 /LPF    Methodology Manual Light Microscopy    Green Top (Gel)   Result Value Ref Range    Extra Tube Hold for add-ons.    Lavender Top   Result Value Ref Range    Extra Tube hold for add-on    Gold Top - SST   Result Value Ref Range    Extra Tube Hold for add-ons.    Gray Top   Result Value Ref Range    Extra Tube Hold for add-ons.    Light Blue Top   Result Value Ref Range    Extra Tube hold for add-on         All other labs were within normal range or not returned as of this dictation.      EMERGENCY DEPARTMENT COURSE and DIFFERENTIAL DIAGNOSIS/MDM:   Vitals:    Vitals:    03/14/22 0048   BP: 129/96   BP Location: Right arm   Patient Position: Sitting   Pulse: 107   Resp: 18   Temp: 97.8 °F (36.6 °C)   TempSrc: Oral   SpO2: 100%   Weight: 99.8 kg (220 lb)   Height: 162.6 cm (64\")       ED Course as of 03/14/22 1349   Mon Mar 14, 2022   1339 In summary this is a 31 year old female followed by GI Dr. Estrella who presents to ED with complaints of abdominal pain, diarrhea and nausea. Abdominal exam without peritoneal signs. No evidence of acute abdomen at this time. Well appearing. Low suspicion for acute hepatobiliary disease (includng acute cholecystitis), acute pancreatitis, PUD (including perforation), acute infectious processes (pneumonia, hepatitis, pyelonephritis), acute appendicitis, bowel obstruction or viscus perforation. Presentation not consistent with other acute, emergent causes of abdominal pain at this time. CT demonstrates no acute process within the abdomen or pelvis. Nonspecific leukocytosis without acute or emergent findings on labs or UA. Patient is afebrile, nontoxic appearing, vital signs stable and " able to maintain O2 sats of 100% on room air. Patient will be discharged home with symptomatic care and outpatient follow up.  [JG]      ED Course User Index  [JG] Brady Schultz PA     MDM  Number of Diagnoses or Management Options  Diarrhea, unspecified type: new, needed workup  Gastroenteritis: new, needed workup  Leukocytosis, unspecified type: new, needed workup  Non-intractable vomiting with nausea, unspecified vomiting type: new, needed workup  Nonspecific abdominal pain: new, needed workup     Amount and/or Complexity of Data Reviewed  Clinical lab tests: reviewed  Tests in the radiology section of CPT®: reviewed    Risk of Complications, Morbidity, and/or Mortality  Presenting problems: moderate  Diagnostic procedures: moderate  Management options: moderate    Patient Progress  Patient progress: stable       I had a discussion with the patient/family regarding diagnosis, diagnostic results, treatment plan, and medications.  The patient/family indicated understanding of these instructions.  I spent adequate time at the bedside preceding discharge necessary to personally discuss the aftercare instructions, giving patient education, providing explanations of the results of our evaluations/findings, and my decision making to assure that the patient/family understand the plan of care.  Time was allotted to answer questions at that time and throughout the ED course.  Emphasis was placed on timely follow-up after discharge.  I also discussed the potential for the development of an acute emergent condition requiring further evaluation, admission, or even surgical intervention. I discussed that we found nothing during the visit today indicating the need for further workup, admission, or the presence of an unstable medical condition.  I encouraged the patient to return to the emergency department immediately for ANY concerns, worsening, new complaints, or if symptoms persist and unable to seek follow-up in a timely  fashion.  The patient/family expressed understanding and agreement with this plan.  The patient will follow-up with primary care and GI for reevaluation.       MEDICATIONS ADMINISTERED IN ED:  Medications   sodium chloride 0.9 % bolus 1,000 mL (0 mL Intravenous Stopped 3/14/22 0301)   ondansetron (ZOFRAN) injection 4 mg (4 mg Intravenous Given 3/14/22 0136)   iopamidol (ISOVUE-300) 61 % injection 100 mL (100 mL Intravenous Given 3/14/22 0151)       PROCEDURES:  Procedures          CRITICAL CARE TIME    Total Critical Care time was 0 minutes, excluding separately reportable procedures.   There was a high probability of clinically significant/life threatening deterioration in the patient's condition which required my urgent intervention.      FINAL IMPRESSION      1. Nonspecific abdominal pain    2. Leukocytosis, unspecified type    3. Non-intractable vomiting with nausea, unspecified vomiting type    4. Diarrhea, unspecified type    5. Gastroenteritis          DISPOSITION/PLAN     ED Disposition     ED Disposition   Discharge    Condition   Stable    Comment   --             PATIENT REFERRED TO:  Isela Dias MD  [address] [place], [place] [id number]  [phone]    Call   As needed for follow-up appointment with primary care    Santy Estrella MD  [address] [facility] [address]  [place] [address]  [phone]    Call   For follow-up appointment with gastroenterology    [facility] Emergency Department  [address] [facility] [address] [phone]  Go to   If symptoms worsen      DISCHARGE MEDICATIONS:     Medication List      CONTINUE taking these medications    citalopram 20 MG tablet  Commonly known as: CeleXA     dicyclomine 10 MG capsule  Commonly known as: Bentyl  Take 1 capsule by mouth 3 (Three) Times a Day As Needed (abdominal pain).     mesalamine 0.375 g 24 hr capsule  Commonly known as: APRISO  Take 4 capsules by mouth Daily.     metroNIDAZOLE 500 MG  tablet  Commonly known as: FLAGYL  Take 1 tablet by mouth 3 (Three) Times a Day.     pantoprazole 40 MG EC tablet  Commonly known as: PROTONIX  Take 1 tablet by mouth Daily.     prenatal (CLASSIC) vitamin  tablet  Generic drug: prenatal vitamin     Trintellix 10 MG tablet  Generic drug: Vortioxetine HBr                Comment: Please note this report has been produced using speech recognition software.      MARYANNE Pittman Jason C, PA  03/14/22 7911

## 2022-03-14 NOTE — TELEPHONE ENCOUNTER
Patient went to er last night with sever abd pain. Started shortly after eating some raw veggies. She states she did not eat a lot of them though.     Pain started about 10pm, on right side and would radiate. Pain was above a 10.  States the pain was sharp at times. Patient did have diarrhea and vomiting yesterday once the pain started. She states that she had been pretty regular and no diarrhea up until her abd pain started. She compared the abd pain to child birth.     States the er did ct scan and told her it was normal and that her wbc was up a little.     Today she feels like it is just tender but no vomiting today. Its a dull aching pain.     Please review er record from last night.    Thank you

## 2022-04-07 ENCOUNTER — TELEPHONE (OUTPATIENT)
Dept: GASTROENTEROLOGY | Facility: CLINIC | Age: 32
End: 2022-04-07

## 2022-04-07 NOTE — TELEPHONE ENCOUNTER
,  Ms.Uma called she is having severe nausea when she takes her mesalamine. She was wondering if she needs something else or you can send in nausea medicine for her to take with this medicine. Please advise ?

## 2022-04-08 DIAGNOSIS — R11.0 NAUSEA: Primary | ICD-10-CM

## 2022-04-08 RX ORDER — ONDANSETRON 4 MG/1
4 TABLET, ORALLY DISINTEGRATING ORAL EVERY 6 HOURS PRN
Qty: 90 TABLET | Refills: 3 | Status: SHIPPED | OUTPATIENT
Start: 2022-04-08

## 2022-04-21 ENCOUNTER — OFFICE VISIT (OUTPATIENT)
Dept: GASTROENTEROLOGY | Facility: CLINIC | Age: 32
End: 2022-04-21

## 2022-04-21 VITALS
HEIGHT: 64 IN | TEMPERATURE: 98 F | SYSTOLIC BLOOD PRESSURE: 128 MMHG | HEART RATE: 72 BPM | WEIGHT: 226.8 LBS | BODY MASS INDEX: 38.72 KG/M2 | OXYGEN SATURATION: 98 % | DIASTOLIC BLOOD PRESSURE: 84 MMHG

## 2022-04-21 DIAGNOSIS — R10.9 CHRONIC ABDOMINAL PAIN: Primary | ICD-10-CM

## 2022-04-21 DIAGNOSIS — Z98.890 HISTORY OF BOWEL DIVERSION SURGERY: ICD-10-CM

## 2022-04-21 DIAGNOSIS — G89.29 CHRONIC ABDOMINAL PAIN: Primary | ICD-10-CM

## 2022-04-21 PROCEDURE — 99214 OFFICE O/P EST MOD 30 MIN: CPT | Performed by: INTERNAL MEDICINE

## 2022-04-21 RX ORDER — PREDNISONE 10 MG/1
10 TABLET ORAL DAILY
Qty: 180 TABLET | Refills: 1 | Status: SHIPPED | OUTPATIENT
Start: 2022-04-21

## 2022-04-21 NOTE — PROGRESS NOTES
PCP: Isela Dias MD    Chief Complaint   Patient presents with   • Follow-up     3 mo follow up on Diarrhea       History of Present Illness:   Corrine Eaton is a 31 y.o. female who presents to GI clinic as a follow up for chronic abdominal pain with alterred bowel habits:  ibs and suspected crohn's disease.  The only evidence for crohn's disease has been her h/o surgical resection and 6 apthous ulcers at anastamosis.    Unfortunately she has not responded well to budesonide, mesalamine therapy and given her stress provocation, felt that her IBS was driving her abdominal pain.  However, she went to ED with severe pain 3/2021 and vomiting following a moderate volume of uncoocked vegetables.       To review: she was placed on bentyl prn, mesalamine and an MRE was performed along with stool studies. Stool studies + rotavirus, mre without small bowel stricture, mild rectal wall thickening.  She states she had less abdominal cramping on low fodmap diet but more liquid stool. She would typically not have a bm every day on regular diet.        Past Medical History:   Diagnosis Date   • Colon polyp        Past Surgical History:   Procedure Laterality Date   • APPENDECTOMY     • CHOLECYSTECTOMY     • COLONOSCOPY           Current Outpatient Medications:   •  citalopram (CeleXA) 20 MG tablet, , Disp: , Rfl:   •  dicyclomine (Bentyl) 10 MG capsule, Take 1 capsule by mouth 3 (Three) Times a Day As Needed (abdominal pain)., Disp: 180 capsule, Rfl: 3  •  mesalamine (APRISO) 0.375 g 24 hr capsule, Take 4 capsules by mouth Daily., Disp: 180 capsule, Rfl: 3  •  metroNIDAZOLE (FLAGYL) 500 MG tablet, Take 1 tablet by mouth 3 (Three) Times a Day., Disp: 30 tablet, Rfl: 0  •  ondansetron ODT (Zofran ODT) 4 MG disintegrating tablet, Place 1 tablet on the tongue Every 6 (Six) Hours As Needed for Nausea or Vomiting., Disp: 90 tablet, Rfl: 3  •  pantoprazole (PROTONIX) 40 MG EC tablet, Take 1 tablet by mouth Daily., Disp: 90 tablet,  Rfl: 3  •  prenatal vitamin (prenatal, CLASSIC, vitamin) tablet, Take  by mouth Daily., Disp: , Rfl:   •  Trintellix 10 MG tablet, , Disp: , Rfl:     No Known Allergies    Family History   Problem Relation Age of Onset   • Colon cancer Neg Hx    • Colon polyps Neg Hx    • Esophageal cancer Neg Hx        Social History     Socioeconomic History   • Marital status: Single   Tobacco Use   • Smoking status: Never Smoker   • Smokeless tobacco: Never Used   Vaping Use   • Vaping Use: Never used   Substance and Sexual Activity   • Alcohol use: Yes       Review of Systems  A complete 12 point ros was asked and is negative except for that mentioned above.  In particular:  No fever  No rash  No increased arthralgias  No worsening edema  No cough  No dyspnea  No chest pain      Vitals:    04/21/22 1443   BP: 128/84   Pulse: 72   Temp: 98 °F (36.7 °C)   SpO2: 98%       Physical Exam  General: well developed, well nourished  A+O x 3 NAD  HEENT: NCAT, pupils equal appearing, sclera appear white  NECK: full ROM  Respiratory: symmetric chest rise, normal effort, normal work of breathing, no overt rales  Abdomen: non-distended  Skin: normal color, no jaundice  Neuro: no tremor, no facial droop  Psych: normal mood and affect  High bmi    Assessment/Plan  1.) Inflammatory bowel disease,   2.) Chronic diarrhea  3.) Chronic abdominal pain  Workup: colonoscopy 9/2021 with 6 apthous ulcers, MRE normal 2021 without small bowel disease  Cbc, cmp crp: wnl.  H/o surgical extraction of suspected inflammatory crohn's.  No nsaid use  - suspect crohn's disease  - low fodmap diet helped crampy pain but she had more liquid bms  - has periods of no bm on regular diet    Plan:  - go back to regular diet  - Given her severe pain and apthous ulcers, will give prednisone trial. If she has a fever, she will go to ed.   - if diarrhea worsens: stool studies, if diarrhea unchanged, favor IBS with possible gastroenteritis exacerbation    Santy Estrella,  MD  4/21/2022

## 2022-05-31 ENCOUNTER — PRIOR AUTHORIZATION (OUTPATIENT)
Dept: GASTROENTEROLOGY | Facility: CLINIC | Age: 32
End: 2022-05-31

## 2022-06-01 NOTE — TELEPHONE ENCOUNTER
INSURANCE DENIED ZOFRAN - PATIENT ALLOWED 36 TABLETS FOR 67 DAYS - PATIENT WOULD LIKE TO KNOW IF SHE NEEDS TO CHANGE MEDICATIONS - THANKS

## 2022-06-21 ENCOUNTER — TELEPHONE (OUTPATIENT)
Dept: GASTROENTEROLOGY | Facility: CLINIC | Age: 32
End: 2022-06-21

## 2022-06-21 DIAGNOSIS — K52.9 CHRONIC DIARRHEA: Primary | ICD-10-CM

## 2022-06-21 NOTE — TELEPHONE ENCOUNTER
Per his note- if she worsened or did not improve on the prednisone he wanted stool studies performed.  I placed the order

## 2022-06-21 NOTE — TELEPHONE ENCOUNTER
PATIENT IS GOING TO TRY TO GET STOOL STUDIES DONE - PATIENT LIVES TO FAR TO GET THESE DONE AT [facility] - SHE WILL TRY TO GET THESE DONE AT [place] - THANKS

## 2022-06-22 ENCOUNTER — LAB (OUTPATIENT)
Dept: LAB | Facility: HOSPITAL | Age: 32
End: 2022-06-22

## 2022-06-22 DIAGNOSIS — K52.9 CHRONIC DIARRHEA: ICD-10-CM

## 2022-06-22 LAB
ADV 40+41 DNA STL QL NAA+NON-PROBE: NOT DETECTED
ASTRO TYP 1-8 RNA STL QL NAA+NON-PROBE: NOT DETECTED
C CAYETANENSIS DNA STL QL NAA+NON-PROBE: NOT DETECTED
C COLI+JEJ+UPSA DNA STL QL NAA+NON-PROBE: NOT DETECTED
CRP SERPL-MCNC: 0.85 MG/DL (ref 0–0.5)
CRYPTOSP DNA STL QL NAA+NON-PROBE: NOT DETECTED
E HISTOLYT DNA STL QL NAA+NON-PROBE: NOT DETECTED
EAEC PAA PLAS AGGR+AATA ST NAA+NON-PRB: NOT DETECTED
EC STX1+STX2 GENES STL QL NAA+NON-PROBE: NOT DETECTED
EPEC EAE GENE STL QL NAA+NON-PROBE: NOT DETECTED
ETEC LTA+ST1A+ST1B TOX ST NAA+NON-PROBE: NOT DETECTED
G LAMBLIA DNA STL QL NAA+NON-PROBE: NOT DETECTED
NOROVIRUS GI+II RNA STL QL NAA+NON-PROBE: NOT DETECTED
P SHIGELLOIDES DNA STL QL NAA+NON-PROBE: NOT DETECTED
RVA RNA STL QL NAA+NON-PROBE: NOT DETECTED
S ENT+BONG DNA STL QL NAA+NON-PROBE: NOT DETECTED
SAPO I+II+IV+V RNA STL QL NAA+NON-PROBE: NOT DETECTED
SHIGELLA SP+EIEC IPAH ST NAA+NON-PROBE: NOT DETECTED
V CHOL+PARA+VUL DNA STL QL NAA+NON-PROBE: NOT DETECTED
V CHOLERAE DNA STL QL NAA+NON-PROBE: NOT DETECTED
Y ENTEROCOL DNA STL QL NAA+NON-PROBE: NOT DETECTED

## 2022-06-22 PROCEDURE — 87209 SMEAR COMPLEX STAIN: CPT

## 2022-06-22 PROCEDURE — 87507 IADNA-DNA/RNA PROBE TQ 12-25: CPT

## 2022-06-22 PROCEDURE — 87177 OVA AND PARASITES SMEARS: CPT

## 2022-06-22 PROCEDURE — 83993 ASSAY FOR CALPROTECTIN FECAL: CPT

## 2022-06-22 PROCEDURE — 86140 C-REACTIVE PROTEIN: CPT

## 2022-06-24 LAB — CALPROTECTIN STL-MCNT: 51 UG/G (ref 0–120)

## 2022-06-28 LAB
O+P SPEC MICRO: NORMAL
O+P STL TRI STN: NORMAL

## 2022-06-30 NOTE — TELEPHONE ENCOUNTER
TALK TO PATIENT - PATIENT HAS STOPPED THE APRISO - SHE IS DONE WITH THE PREDNISONE TAPER - WILL START XIFAXAN

## 2022-06-30 NOTE — TELEPHONE ENCOUNTER
Ms Uma Abdullahi called back this morning. Patient stated that she is a little confused on why she starting Xifaxan for IBS. Patient would like for you to give her call back so she can ask you a few more questions. Thanks

## 2022-07-01 PROBLEM — K50.90 CROHN'S DISEASE WITHOUT COMPLICATION: Status: ACTIVE | Noted: 2022-07-01

## 2022-07-08 DIAGNOSIS — K52.9 CHRONIC DIARRHEA: ICD-10-CM

## 2022-07-08 DIAGNOSIS — K50.90 CROHN'S DISEASE WITHOUT COMPLICATION, UNSPECIFIED GASTROINTESTINAL TRACT LOCATION: Primary | ICD-10-CM

## 2022-07-08 RX ORDER — DIPHENHYDRAMINE HCL 25 MG
25 CAPSULE ORAL ONCE
Status: CANCELLED | OUTPATIENT
Start: 2022-07-08

## 2022-07-08 RX ORDER — ACETAMINOPHEN 325 MG/1
650 TABLET ORAL ONCE
Status: CANCELLED | OUTPATIENT
Start: 2022-07-08

## 2022-07-08 RX ORDER — DIPHENHYDRAMINE HYDROCHLORIDE 50 MG/ML
25 INJECTION INTRAMUSCULAR; INTRAVENOUS ONCE
Status: CANCELLED | OUTPATIENT
Start: 2022-07-08

## 2022-07-08 RX ORDER — SODIUM CHLORIDE 9 MG/ML
250 INJECTION, SOLUTION INTRAVENOUS ONCE
Status: CANCELLED | OUTPATIENT
Start: 2022-07-08 | End: 2022-07-08

## 2022-07-08 RX ORDER — ACETAMINOPHEN 325 MG/1
650 TABLET ORAL ONCE
Status: CANCELLED | OUTPATIENT
Start: 2022-07-08 | End: 2022-07-08

## 2022-07-08 RX ORDER — DIPHENHYDRAMINE HYDROCHLORIDE 50 MG/ML
25 INJECTION INTRAMUSCULAR; INTRAVENOUS ONCE
Status: CANCELLED | OUTPATIENT
Start: 2022-08-01

## 2022-07-08 RX ORDER — SODIUM CHLORIDE 9 MG/ML
250 INJECTION, SOLUTION INTRAVENOUS ONCE
Status: CANCELLED | OUTPATIENT
Start: 2022-07-08

## 2022-07-08 RX ORDER — DIPHENHYDRAMINE HCL 25 MG
25 CAPSULE ORAL ONCE
Status: CANCELLED | OUTPATIENT
Start: 2022-08-01

## 2022-07-08 RX ORDER — MESALAMINE 0.38 G/1
CAPSULE, EXTENDED RELEASE ORAL
Qty: 180 CAPSULE | Refills: 3 | Status: SHIPPED | OUTPATIENT
Start: 2022-07-08

## 2022-07-08 RX ORDER — SODIUM CHLORIDE 9 MG/ML
250 INJECTION, SOLUTION INTRAVENOUS ONCE
Status: CANCELLED | OUTPATIENT
Start: 2022-08-01

## 2022-07-08 RX ORDER — ACETAMINOPHEN 325 MG/1
650 TABLET ORAL ONCE
Status: CANCELLED | OUTPATIENT
Start: 2022-08-01

## 2022-07-11 ENCOUNTER — LAB (OUTPATIENT)
Dept: LAB | Facility: HOSPITAL | Age: 32
End: 2022-07-11

## 2022-07-11 DIAGNOSIS — K50.90 CROHN'S DISEASE WITHOUT COMPLICATION, UNSPECIFIED GASTROINTESTINAL TRACT LOCATION: ICD-10-CM

## 2022-07-11 DIAGNOSIS — K50.90 CROHN'S DISEASE WITHOUT COMPLICATION, UNSPECIFIED GASTROINTESTINAL TRACT LOCATION: Primary | ICD-10-CM

## 2022-07-11 PROCEDURE — 36415 COLL VENOUS BLD VENIPUNCTURE: CPT

## 2022-07-11 PROCEDURE — 86480 TB TEST CELL IMMUN MEASURE: CPT

## 2022-07-13 LAB
GAMMA INTERFERON BACKGROUND BLD IA-ACNC: 0.13 IU/ML
M TB IFN-G BLD-IMP: NEGATIVE
M TB IFN-G CD4+ BCKGRND COR BLD-ACNC: 0.14 IU/ML
M TB IFN-G CD4+CD8+ BCKGRND COR BLD-ACNC: 0.09 IU/ML
MITOGEN IGNF BLD-ACNC: >10 IU/ML
SERVICE CMNT-IMP: NORMAL

## 2022-07-20 ENCOUNTER — TELEPHONE (OUTPATIENT)
Dept: GASTROENTEROLOGY | Facility: CLINIC | Age: 32
End: 2022-07-20

## 2022-07-20 DIAGNOSIS — K50.90 CROHN'S DISEASE WITHOUT COMPLICATION, UNSPECIFIED GASTROINTESTINAL TRACT LOCATION: Primary | ICD-10-CM

## 2022-07-21 ENCOUNTER — APPOINTMENT (OUTPATIENT)
Dept: ONCOLOGY | Facility: HOSPITAL | Age: 32
End: 2022-07-21

## 2022-07-22 ENCOUNTER — LAB (OUTPATIENT)
Dept: LAB | Facility: HOSPITAL | Age: 32
End: 2022-07-22

## 2022-07-22 DIAGNOSIS — K50.90 CROHN'S DISEASE WITHOUT COMPLICATION, UNSPECIFIED GASTROINTESTINAL TRACT LOCATION: Primary | ICD-10-CM

## 2022-07-22 PROCEDURE — 87385 HISTOPLASMA CAPSUL AG IA: CPT

## 2022-07-22 PROCEDURE — 87340 HEPATITIS B SURFACE AG IA: CPT

## 2022-07-22 PROCEDURE — 86704 HEP B CORE ANTIBODY TOTAL: CPT

## 2022-07-22 PROCEDURE — 86480 TB TEST CELL IMMUN MEASURE: CPT

## 2022-07-22 PROCEDURE — 86682 HELMINTH ANTIBODY: CPT

## 2022-07-23 LAB
HBV CORE AB SERPL QL IA: NEGATIVE
HBV SURFACE AG SERPL QL IA: NORMAL

## 2022-07-26 LAB
H CAPSUL AG UR QL IA: <0.5
STRONGYLOIDES IGG SER QL IA: NEGATIVE

## 2022-07-27 ENCOUNTER — TELEPHONE (OUTPATIENT)
Dept: GASTROENTEROLOGY | Facility: CLINIC | Age: 32
End: 2022-07-27

## 2022-07-28 ENCOUNTER — SPECIALTY PHARMACY (OUTPATIENT)
Dept: PHARMACY | Facility: TELEHEALTH | Age: 32
End: 2022-07-28

## 2022-07-28 ENCOUNTER — PRIOR AUTHORIZATION (OUTPATIENT)
Dept: GASTROENTEROLOGY | Facility: CLINIC | Age: 32
End: 2022-07-28

## 2022-07-28 DIAGNOSIS — K50.90 CROHN'S DISEASE WITHOUT COMPLICATION, UNSPECIFIED GASTROINTESTINAL TRACT LOCATION: Primary | ICD-10-CM

## 2022-07-28 NOTE — PROGRESS NOTES
Specialty Pharmacy Patient Management Program  Initial Assessment     Corrine Eaton is a 31 y.o. female with crohn's disease and enrolled in the Patient Management program offered by [facility].  An initial outreach was conducted, including assessment of therapy appropriateness and specialty medication education for Humira pen 80mg/0.8ml. The patient was introduced to services offered by [facility], including: regular assessments, refill coordination, curbside pick-up or mail order delivery options, prior authorization maintenance, and financial assistance programs as applicable. The patient was also provided with contact information for the pharmacy team.     Insurance Coverage & Financial Support  Prior authorization approved through [employer] and balance covered under Humira complete program.     Relevant Past Medical History and Comorbidities  Relevant medical history and concomitant health conditions were discussed with the patient. The patient's chart has been reviewed for relevant past medical history and comorbid health conditions and updated as necessary.   Past Medical History:   Diagnosis Date   • Colon polyp      Social History     Socioeconomic History   • Marital status: Single   Tobacco Use   • Smoking status: Never Smoker   • Smokeless tobacco: Never Used   Vaping Use   • Vaping Use: Never used   Substance and Sexual Activity   • Alcohol use: Yes       Allergies  Known allergies and reactions were discussed with the patient. The patient's chart has been reviewed for  allergy information and updated as necessary.   Patient has no known allergies.    Current Medication List  This medication list has been reviewed with the patient and evaluated for any interactions or necessary modifications/recommendations, and updated to include all prescription medications, OTC medications, and supplements the patient is currently taking.  This list reflects what is  contained in the patient's profile, which has also been marked as reviewed to communicate to other providers it is the most up to date version of the patient's current medication therapy.     Current Outpatient Medications:   •  Adalimumab (HUMIRA) 80 MG/0.8ML injection, Inject 80mg (0.8ml) under the skin on days 1 and 2, and then inject 80mg (0.8ml) under the skin on day 15, Disp: 3 each, Rfl: 0  •  citalopram (CeleXA) 20 MG tablet, , Disp: , Rfl:   •  dicyclomine (Bentyl) 10 MG capsule, Take 1 capsule by mouth 3 (Three) Times a Day As Needed (abdominal pain)., Disp: 180 capsule, Rfl: 3  •  mesalamine (APRISO) 0.375 g 24 hr capsule, TAKE 4 CAPSULES BY MOUTH EVERY DAY, Disp: 180 capsule, Rfl: 3  •  ondansetron ODT (Zofran ODT) 4 MG disintegrating tablet, Place 1 tablet on the tongue Every 6 (Six) Hours As Needed for Nausea or Vomiting., Disp: 90 tablet, Rfl: 3  •  pantoprazole (PROTONIX) 40 MG EC tablet, Take 1 tablet by mouth Daily., Disp: 90 tablet, Rfl: 3  •  predniSONE (DELTASONE) 10 MG tablet, Take 1 tablet by mouth Daily. Take 40 mg po x 2 weeks and drop by 5 mg a week, Disp: 180 tablet, Rfl: 1  •  prenatal vitamin (prenatal, CLASSIC, vitamin) tablet, Take  by mouth Daily., Disp: , Rfl:   •  riFAXIMin (XIFAXAN) 550 MG tablet, Take 1 tablet by mouth 3 (Three) Times a Day., Disp: 42 tablet, Rfl: 0  •  Trintellix 10 MG tablet, , Disp: , Rfl:     Drug Interactions  none     Relevant Laboratory Values  No results for input(s): CMP, BMP, CBC in the last 72 hours.    Initial Education Provided for Specialty Medication  The patient has been provided with the following education and any applicable administration techniques (i.e. self-injection) have been demonstrated for the therapies indicated. All questions and concerns have been addressed prior to the patient receiving the medication, and the patient has verbalized understanding of the education and any materials provided.  Additional patient education shall be  provided and documented upon request by the patient, provider or payer.            Humira (adalimumab)           Medication Expectations   Why am I taking this medication? You are taking this medication for Crohn's disease,ulcerative colitis, rheumatoid or psoriatic arthritis.   What should I expect while on this medication? You should expect a decrease in the frequency and severity of symptoms.   How does the medication work? Adalimumab binds to TNF-alpha therefore interfering with binding to a TNFa receptor site.   How long will I be on this medication for? The amount of time you will be on this medication will be determined by your doctor and your response to the medication.    How do I take this medication? Take as directed on your prescription label.  This medication is a subcutaneous injection given in the fatty part of the skin on the top of the thigh or stomach area. May leave at room temperature for 15-30 minutes prior to injection.   What are some possible side effects? Injection site reactions and hypersensitivity reactions, headache, signs of a common cold, stomach pain, upset stomach, or back pain.   What happens if I miss a dose? If you miss a dose, take it as soon as you remember. If it is close to the time for your next dose, skip the missed dose and go back to your normal time.                    Medication Safety   What are things I should warn my doctor immediately about? Allergic reaction such has hives or trouble breathing. If you develop symptoms such as a cough that does not go away, weight loss, changes in how often you urinate, numbness or tingling in extremities, rash on cheeks or other body parts, unusual bleeding or bruising.     What are things that I should be cautious of? Injection site reaction, back pain, and headache. You may have more chance of getting an infection.  Wash your hands often and stay away from people with infections, colds, or flu.   What are some medications that can  interact with this one? Immunosuppressants and vaccines.            Medication Storage/Handling   How should I handle this medication? Keep this medication our of reach of pets/children in original container.  Store in the original container to protect from light. Do not inject where the skin is tender, bruised, red, hard, or affected by psoriasis.  Rotate injection sites.   How does this medication need to be stored? Store in refrigerator and keep dry. If needed, you may store at room temperature for up to 14 days.   How should I dispose of this medication? You can dispose of the empty syringe in a sharps container, and if this is not available you may use an empty hard plastic container such as a milk jug or tide container.            Resources/Support   How can I remind myself to take this medication? You can download a reminder lo on your phone or use a calandar  to help with your injection.   Is financial support available?  Yes, [facility] can provide co-pay cards if you have commercial insurance or patient assistance if you have Medicare or no insurance.    Which vaccines are recommended for me? Talk to your doctor about these vaccines: Flu, Coronavirus (COVID-19), Pneumococcal (pneumonia), Tdap, Hepatitis B, Zoster (shingles).                Adherence and Self-Administration  • Barriers to Patient Adherence and/or Self-Administration: none   • Methods for Supporting Patient Adherence and/or Self-Administration: none required    Goals of Therapy  • Patient Goals of Therapy: reduction of flares  • Clinical Goals or Therapeutic Targets, If Applicable: maintain adherence of greater than 80%     Reassessment Plan & Follow-Up  1. Medication Therapy Changes: none  2. Additional Plans, Therapy Recommendations, or Therapy Problems to Be Addressed: none  3. Patient is receiving starter pack first.  She is doing 80mg on day 1,2,15.  Patient is also receiving travel pack, sharps container and return box from [facility]  Complete program.   4. Pharmacist to perform regular reassessments no more than (6) months from the previous assessment.  5. Welcome information and patient satisfaction survey to be sent by retail team with patient's initial fill.  6. Care Coordinator to set up future refill outreaches, coordinate prescription delivery, and escalate clinical questions to pharmacist.     Attestation  I attest that the initiated specialty medication(s) are appropriate for the patient based on my assessment.  If the prescribed therapy is at any point deemed not appropriate based on the current or future assessments, a consultation will be initiated with the patient's specialty care provider to determine the best course of action. The revised plan of therapy will be documented along with any additional patient education provided.     Electronically signed by Arun Dutton RPH, 07/28/22, 10:31 AM EDT.

## 2022-07-29 LAB
GAMMA INTERFERON BACKGROUND BLD IA-ACNC: 0.11 IU/ML
M TB IFN-G BLD-IMP: POSITIVE
M TB IFN-G CD4+ BCKGRND COR BLD-ACNC: 0.71 IU/ML
M TB IFN-G CD4+CD8+ BCKGRND COR BLD-ACNC: 0.15 IU/ML
MITOGEN IGNF BLD-ACNC: >10 IU/ML
QUANTIFERON INCUBATION: ABNORMAL
SERVICE CMNT-IMP: ABNORMAL

## 2022-08-01 DIAGNOSIS — K50.90 CROHN'S DISEASE WITHOUT COMPLICATION, UNSPECIFIED GASTROINTESTINAL TRACT LOCATION: ICD-10-CM

## 2022-08-02 DIAGNOSIS — R76.12 POSITIVE QUANTIFERON-TB GOLD TEST: Primary | ICD-10-CM

## 2022-08-02 DIAGNOSIS — K50.90 CROHN'S DISEASE WITHOUT COMPLICATION, UNSPECIFIED GASTROINTESTINAL TRACT LOCATION: ICD-10-CM

## 2022-08-03 ENCOUNTER — HOSPITAL ENCOUNTER (OUTPATIENT)
Dept: GENERAL RADIOLOGY | Facility: HOSPITAL | Age: 32
Discharge: HOME | End: 2022-08-03
Admitting: INTERNAL MEDICINE

## 2022-08-03 ENCOUNTER — TELEPHONE (OUTPATIENT)
Dept: GASTROENTEROLOGY | Facility: CLINIC | Age: 32
End: 2022-08-03

## 2022-08-03 DIAGNOSIS — R76.12 POSITIVE QUANTIFERON-TB GOLD TEST: ICD-10-CM

## 2022-08-03 PROCEDURE — 71046 X-RAY EXAM CHEST 2 VIEWS: CPT

## 2022-08-04 ENCOUNTER — APPOINTMENT (OUTPATIENT)
Dept: ONCOLOGY | Facility: HOSPITAL | Age: 32
End: 2022-08-04

## 2022-08-09 ENCOUNTER — TELEPHONE (OUTPATIENT)
Dept: GASTROENTEROLOGY | Facility: CLINIC | Age: 32
End: 2022-08-09

## 2022-08-09 ENCOUNTER — TRANSCRIBE ORDERS (OUTPATIENT)
Dept: LAB | Facility: HOSPITAL | Age: 32
End: 2022-08-09

## 2022-08-09 ENCOUNTER — PRIOR AUTHORIZATION (OUTPATIENT)
Dept: GASTROENTEROLOGY | Facility: CLINIC | Age: 32
End: 2022-08-09

## 2022-08-09 ENCOUNTER — LAB (OUTPATIENT)
Dept: LAB | Facility: HOSPITAL | Age: 32
End: 2022-08-09

## 2022-08-09 DIAGNOSIS — K50.919 CROHN'S DISEASE WITH COMPLICATION, UNSPECIFIED GASTROINTESTINAL TRACT LOCATION: ICD-10-CM

## 2022-08-09 DIAGNOSIS — R76.12 NONSPECIFIC REACTION TO CELL MEDIATED IMMUNITY MEASUREMENT OF GAMMA INTERFERON ANTIGEN RESPONSE WITHOUT ACTIVE TUBERCULOSIS: ICD-10-CM

## 2022-08-09 DIAGNOSIS — E66.9 OBESITY, UNSPECIFIED CLASSIFICATION, UNSPECIFIED OBESITY TYPE, UNSPECIFIED WHETHER SERIOUS COMORBIDITY PRESENT: Primary | ICD-10-CM

## 2022-08-09 LAB
ALBUMIN SERPL-MCNC: 4.1 G/DL (ref 3.5–5.2)
ALBUMIN/GLOB SERPL: 1.2 G/DL
ALP SERPL-CCNC: 59 U/L (ref 39–117)
ALT SERPL W P-5'-P-CCNC: 11 U/L (ref 1–33)
ANION GAP SERPL CALCULATED.3IONS-SCNC: 10 MMOL/L (ref 5–15)
AST SERPL-CCNC: 11 U/L (ref 1–32)
BILIRUB SERPL-MCNC: 0.4 MG/DL (ref 0–1.2)
BUN SERPL-MCNC: 8 MG/DL (ref 6–20)
BUN/CREAT SERPL: 8.2 (ref 7–25)
CALCIUM SPEC-SCNC: 9.3 MG/DL (ref 8.6–10.5)
CHLORIDE SERPL-SCNC: 101 MMOL/L (ref 98–107)
CO2 SERPL-SCNC: 24 MMOL/L (ref 22–29)
CREAT SERPL-MCNC: 0.98 MG/DL (ref 0.57–1)
EGFRCR SERPLBLD CKD-EPI 2021: 78.8 ML/MIN/1.73
GLOBULIN UR ELPH-MCNC: 3.5 GM/DL
GLUCOSE SERPL-MCNC: 106 MG/DL (ref 65–99)
POTASSIUM SERPL-SCNC: 4.5 MMOL/L (ref 3.5–5.2)
PROT SERPL-MCNC: 7.6 G/DL (ref 6–8.5)
SODIUM SERPL-SCNC: 135 MMOL/L (ref 136–145)

## 2022-08-09 PROCEDURE — 36415 COLL VENOUS BLD VENIPUNCTURE: CPT | Performed by: INTERNAL MEDICINE

## 2022-08-09 PROCEDURE — 80053 COMPREHEN METABOLIC PANEL: CPT | Performed by: INTERNAL MEDICINE

## 2022-08-09 NOTE — TELEPHONE ENCOUNTER
I CALLED MS PUENTES BACK. PATIENT STATED THAT INFECTIOUS DISEASE HE GOING TO PUT HER ON MEDICATION BUT SHE CAN'T START HUMIRA UNTIL AFTER SHE FINISHED THIS MEDICATION. PATIENT IS REQUESTING PREDNISONE UNTIL SHE FINISHED INFECTIOUS DISEASE MEDICATION. PATIENT'S DOESN'T REMEMBER THE NAME OF THE NEW MEDICATION. SHE AGREED TO SEND ALL INFORMATION THROUGH MY CHART AND WE WILL MAKE SURE DR MCKEON GET THIS INFORMATION. PATIENT VOICED UNDERSTANDING.

## 2022-08-10 RX ORDER — BUDESONIDE 3 MG/1
6 CAPSULE, COATED PELLETS ORAL DAILY
Qty: 60 CAPSULE | Refills: 1 | Status: SHIPPED | OUTPATIENT
Start: 2022-08-10 | End: 2022-09-01

## 2022-08-22 ENCOUNTER — DOCUMENTATION (OUTPATIENT)
Dept: PHARMACY | Facility: TELEHEALTH | Age: 32
End: 2022-08-22

## 2022-08-22 ENCOUNTER — SPECIALTY PHARMACY (OUTPATIENT)
Dept: PHARMACY | Facility: TELEHEALTH | Age: 32
End: 2022-08-22

## 2022-09-01 RX ORDER — BUDESONIDE 3 MG/1
6 CAPSULE, COATED PELLETS ORAL DAILY
Qty: 60 CAPSULE | Refills: 1 | Status: SHIPPED | OUTPATIENT
Start: 2022-09-01 | End: 2022-10-01

## 2022-09-06 ENCOUNTER — SPECIALTY PHARMACY (OUTPATIENT)
Dept: PHARMACY | Facility: TELEHEALTH | Age: 32
End: 2022-09-06

## 2022-09-07 ENCOUNTER — TRANSCRIBE ORDERS (OUTPATIENT)
Dept: LAB | Facility: HOSPITAL | Age: 32
End: 2022-09-07

## 2022-09-07 ENCOUNTER — LAB (OUTPATIENT)
Dept: LAB | Facility: HOSPITAL | Age: 32
End: 2022-09-07

## 2022-09-07 DIAGNOSIS — R76.12 NONSPECIFIC REACTION TO CELL MEDIATED IMMUNITY MEASUREMENT OF GAMMA INTERFERON ANTIGEN RESPONSE WITHOUT ACTIVE TUBERCULOSIS: ICD-10-CM

## 2022-09-07 DIAGNOSIS — K50.919 CROHN'S DISEASE WITH COMPLICATION, UNSPECIFIED GASTROINTESTINAL TRACT LOCATION: ICD-10-CM

## 2022-09-07 DIAGNOSIS — R76.12 POSITIVE QUANTIFERON-TB GOLD TEST: ICD-10-CM

## 2022-09-07 DIAGNOSIS — E66.9 OBESITY, UNSPECIFIED CLASSIFICATION, UNSPECIFIED OBESITY TYPE, UNSPECIFIED WHETHER SERIOUS COMORBIDITY PRESENT: ICD-10-CM

## 2022-09-07 DIAGNOSIS — E66.9 OBESITY, UNSPECIFIED CLASSIFICATION, UNSPECIFIED OBESITY TYPE, UNSPECIFIED WHETHER SERIOUS COMORBIDITY PRESENT: Primary | ICD-10-CM

## 2022-09-07 DIAGNOSIS — K50.90 CROHN'S DISEASE WITHOUT COMPLICATION, UNSPECIFIED GASTROINTESTINAL TRACT LOCATION: Primary | ICD-10-CM

## 2022-09-07 LAB
ALBUMIN SERPL-MCNC: 4.1 G/DL (ref 3.5–5.2)
ALBUMIN/GLOB SERPL: 1.3 G/DL
ALP SERPL-CCNC: 57 U/L (ref 39–117)
ALT SERPL W P-5'-P-CCNC: 15 U/L (ref 1–33)
ANION GAP SERPL CALCULATED.3IONS-SCNC: 11 MMOL/L (ref 5–15)
AST SERPL-CCNC: 11 U/L (ref 1–32)
BILIRUB SERPL-MCNC: 0.3 MG/DL (ref 0–1.2)
BUN SERPL-MCNC: 11 MG/DL (ref 6–20)
BUN/CREAT SERPL: 11.5 (ref 7–25)
CALCIUM SPEC-SCNC: 9.6 MG/DL (ref 8.6–10.5)
CHLORIDE SERPL-SCNC: 103 MMOL/L (ref 98–107)
CO2 SERPL-SCNC: 24 MMOL/L (ref 22–29)
CREAT SERPL-MCNC: 0.96 MG/DL (ref 0.57–1)
EGFRCR SERPLBLD CKD-EPI 2021: 80.8 ML/MIN/1.73
GLOBULIN UR ELPH-MCNC: 3.1 GM/DL
GLUCOSE SERPL-MCNC: 111 MG/DL (ref 65–99)
POTASSIUM SERPL-SCNC: 3.7 MMOL/L (ref 3.5–5.2)
PROT SERPL-MCNC: 7.2 G/DL (ref 6–8.5)
SODIUM SERPL-SCNC: 138 MMOL/L (ref 136–145)

## 2022-09-07 PROCEDURE — 36415 COLL VENOUS BLD VENIPUNCTURE: CPT

## 2022-09-07 PROCEDURE — 80053 COMPREHEN METABOLIC PANEL: CPT

## 2022-09-27 RX ORDER — PANTOPRAZOLE SODIUM 40 MG/1
TABLET, DELAYED RELEASE ORAL
Qty: 90 TABLET | Refills: 3 | Status: SHIPPED | OUTPATIENT
Start: 2022-09-27

## 2022-09-29 ENCOUNTER — OFFICE VISIT (OUTPATIENT)
Dept: GASTROENTEROLOGY | Facility: CLINIC | Age: 32
End: 2022-09-29

## 2022-09-29 VITALS
SYSTOLIC BLOOD PRESSURE: 126 MMHG | DIASTOLIC BLOOD PRESSURE: 82 MMHG | BODY MASS INDEX: 39.4 KG/M2 | HEART RATE: 72 BPM | TEMPERATURE: 98 F | WEIGHT: 230.8 LBS | OXYGEN SATURATION: 98 % | HEIGHT: 64 IN

## 2022-09-29 DIAGNOSIS — K50.90 CROHN'S DISEASE WITHOUT COMPLICATION, UNSPECIFIED GASTROINTESTINAL TRACT LOCATION: Primary | ICD-10-CM

## 2022-09-29 PROCEDURE — 99214 OFFICE O/P EST MOD 30 MIN: CPT | Performed by: INTERNAL MEDICINE

## 2022-09-29 RX ORDER — NORETHINDRONE ACETATE AND ETHINYL ESTRADIOL AND FERROUS FUMARATE 1MG-20(21)
KIT ORAL
COMMUNITY
Start: 2022-07-02

## 2022-09-29 NOTE — PROGRESS NOTES
PCP: Isela Dias MD    Chief Complaint   Patient presents with   • Follow-up     3 mo follow up on Abdominal Pain       History of Present Illness:   Corrine Eaton is a 31 y.o. female who presents to GI clinic as a follow up for crohn's disease and suspected ibs.  Having formed bms on humira x 3 days after induction dose. Also less pain.  No gib loss or fever.      Past Medical History:   Diagnosis Date   • Colon polyp        Past Surgical History:   Procedure Laterality Date   • APPENDECTOMY     • CHOLECYSTECTOMY     • COLONOSCOPY           Current Outpatient Medications:   •  Adalimumab (HUMIRA) 40 MG/0.4ML Pen-injector Kit, Inject 40 mg under the skin into the appropriate area as directed Every 14 (Fourteen) Days., Disp: 2 each, Rfl: 11  •  Adalimumab (HUMIRA) 80 MG/0.8ML injection, Inject 80mg (0.8ml) under the skin on days 1 and 2, and then inject 80mg (0.8ml) under the skin on day 15, Disp: 3 each, Rfl: 0  •  Budesonide (ENTOCORT EC) 3 MG 24 hr capsule, TAKE 2 CAPSULES BY MOUTH DAILY FOR 30 DAYS., Disp: 60 capsule, Rfl: 1  •  citalopram (CeleXA) 20 MG tablet, , Disp: , Rfl:   •  dicyclomine (Bentyl) 10 MG capsule, Take 1 capsule by mouth 3 (Three) Times a Day As Needed (abdominal pain)., Disp: 180 capsule, Rfl: 3  •  Junel FE 1/20 1-20 MG-MCG per tablet, TAKE 1 TABLET BY MOUTH 1 TIME EACH DAY., Disp: , Rfl:   •  mesalamine (APRISO) 0.375 g 24 hr capsule, TAKE 4 CAPSULES BY MOUTH EVERY DAY, Disp: 180 capsule, Rfl: 3  •  ondansetron ODT (Zofran ODT) 4 MG disintegrating tablet, Place 1 tablet on the tongue Every 6 (Six) Hours As Needed for Nausea or Vomiting., Disp: 90 tablet, Rfl: 3  •  pantoprazole (PROTONIX) 40 MG EC tablet, TAKE 1 TABLET BY MOUTH EVERY DAY, Disp: 90 tablet, Rfl: 3  •  predniSONE (DELTASONE) 10 MG tablet, Take 1 tablet by mouth Daily. Take 40 mg po x 2 weeks and drop by 5 mg a week, Disp: 180 tablet, Rfl: 1  •  prenatal vitamin (prenatal, CLASSIC, vitamin) tablet, Take  by mouth Daily.,  Disp: , Rfl:   •  riFAXIMin (XIFAXAN) 550 MG tablet, Take 1 tablet by mouth 3 (Three) Times a Day., Disp: 42 tablet, Rfl: 0  •  Trintellix 10 MG tablet, , Disp: , Rfl:     No Known Allergies    Family History   Problem Relation Age of Onset   • Colon cancer Neg Hx    • Colon polyps Neg Hx    • Esophageal cancer Neg Hx        Social History     Socioeconomic History   • Marital status: Single   Tobacco Use   • Smoking status: Never Smoker   • Smokeless tobacco: Never Used   Vaping Use   • Vaping Use: Never used   Substance and Sexual Activity   • Alcohol use: Yes       Review of Systems  A complete 12 point ros was asked and is negative except for that mentioned above.  In particular:  No fever  No rash  No increased arthralgias  No worsening edema  No cough  No dyspnea  No chest pain      Vitals:    09/29/22 1532   BP: 126/82   Pulse: 72   Temp: 98 °F (36.7 °C)   SpO2: 98%       Physical Exam  General: well developed, well nourished  A+O x 3 NAD  HEENT: NCAT, pupils equal appearing, sclera appear white  NECK: full ROM  Respiratory: symmetric chest rise, normal effort, normal work of breathing, no overt rales  Abdomen: non-distended  Skin: normal color, no jaundice  Neuro: no tremor, no facial droop  Psych: normal mood and affect  High bmi    Assessment/Plan  1.) Crohn's disease  Workup: colonoscopy 9/2021 with 6 apthous ulcers, MRE normal 2021 without small bowel disease  Cbc, cmp crp: wnl.  H/o surgical extraction of suspected inflammatory crohn's.  No nsaid use  - suspect crohn's disease  - low fodmap diet helped crampy pain but she had more liquid bms  - has periods of no bm on regular diet    Plan:  - humira  - cbc, cmp crp    2.) Medication management  Cbc, cmp    3.) Immunosuppression  Tb test normal 2022  Cbc    4.) Chronic abdominal pain and diarrhea, suspect component of IBS-D  Optimize sleep, hydration/diet, exercise and stressors          Santy Estrella MD  9/29/2022

## 2022-11-02 ENCOUNTER — LAB (OUTPATIENT)
Dept: LAB | Facility: HOSPITAL | Age: 32
End: 2022-11-02

## 2022-11-02 ENCOUNTER — TRANSCRIBE ORDERS (OUTPATIENT)
Dept: LAB | Facility: HOSPITAL | Age: 32
End: 2022-11-02

## 2022-11-02 DIAGNOSIS — K50.919 CROHN'S DISEASE WITH COMPLICATION, UNSPECIFIED GASTROINTESTINAL TRACT LOCATION: ICD-10-CM

## 2022-11-02 DIAGNOSIS — R76.12 NONSPECIFIC REACTION TO CELL MEDIATED IMMUNITY MEASUREMENT OF GAMMA INTERFERON ANTIGEN RESPONSE WITHOUT ACTIVE TUBERCULOSIS: ICD-10-CM

## 2022-11-02 DIAGNOSIS — E66.9 OBESITY, UNSPECIFIED CLASSIFICATION, UNSPECIFIED OBESITY TYPE, UNSPECIFIED WHETHER SERIOUS COMORBIDITY PRESENT: ICD-10-CM

## 2022-11-02 DIAGNOSIS — E66.9 OBESITY, UNSPECIFIED CLASSIFICATION, UNSPECIFIED OBESITY TYPE, UNSPECIFIED WHETHER SERIOUS COMORBIDITY PRESENT: Primary | ICD-10-CM

## 2022-11-02 PROCEDURE — 36415 COLL VENOUS BLD VENIPUNCTURE: CPT

## 2022-11-02 PROCEDURE — 80053 COMPREHEN METABOLIC PANEL: CPT

## 2022-11-03 LAB
ALBUMIN SERPL-MCNC: 4.4 G/DL (ref 3.5–5.2)
ALBUMIN/GLOB SERPL: 1.6 G/DL
ALP SERPL-CCNC: 56 U/L (ref 39–117)
ALT SERPL W P-5'-P-CCNC: 42 U/L (ref 1–33)
ANION GAP SERPL CALCULATED.3IONS-SCNC: 12 MMOL/L (ref 5–15)
AST SERPL-CCNC: 23 U/L (ref 1–32)
BILIRUB SERPL-MCNC: 0.5 MG/DL (ref 0–1.2)
BUN SERPL-MCNC: 8 MG/DL (ref 6–20)
BUN/CREAT SERPL: 9.1 (ref 7–25)
CALCIUM SPEC-SCNC: 9.5 MG/DL (ref 8.6–10.5)
CHLORIDE SERPL-SCNC: 102 MMOL/L (ref 98–107)
CO2 SERPL-SCNC: 23 MMOL/L (ref 22–29)
CREAT SERPL-MCNC: 0.88 MG/DL (ref 0.57–1)
EGFRCR SERPLBLD CKD-EPI 2021: 89.1 ML/MIN/1.73
GLOBULIN UR ELPH-MCNC: 2.8 GM/DL
GLUCOSE SERPL-MCNC: 92 MG/DL (ref 65–99)
POTASSIUM SERPL-SCNC: 4.2 MMOL/L (ref 3.5–5.2)
PROT SERPL-MCNC: 7.2 G/DL (ref 6–8.5)
SODIUM SERPL-SCNC: 137 MMOL/L (ref 136–145)

## 2022-12-14 ENCOUNTER — PRIOR AUTHORIZATION (OUTPATIENT)
Dept: GASTROENTEROLOGY | Facility: CLINIC | Age: 32
End: 2022-12-14

## 2023-01-09 ENCOUNTER — LAB (OUTPATIENT)
Dept: LAB | Facility: HOSPITAL | Age: 33
End: 2023-01-09
Payer: COMMERCIAL

## 2023-01-09 ENCOUNTER — OFFICE VISIT (OUTPATIENT)
Dept: GASTROENTEROLOGY | Facility: CLINIC | Age: 33
End: 2023-01-09
Payer: COMMERCIAL

## 2023-01-09 VITALS
HEIGHT: 64 IN | BODY MASS INDEX: 38.45 KG/M2 | OXYGEN SATURATION: 99 % | WEIGHT: 225.2 LBS | SYSTOLIC BLOOD PRESSURE: 126 MMHG | TEMPERATURE: 97.7 F | HEART RATE: 84 BPM | DIASTOLIC BLOOD PRESSURE: 78 MMHG

## 2023-01-09 DIAGNOSIS — K50.90 CROHN'S DISEASE WITHOUT COMPLICATION, UNSPECIFIED GASTROINTESTINAL TRACT LOCATION: Primary | ICD-10-CM

## 2023-01-09 DIAGNOSIS — K50.90 CROHN'S DISEASE WITHOUT COMPLICATION, UNSPECIFIED GASTROINTESTINAL TRACT LOCATION: ICD-10-CM

## 2023-01-09 LAB
ALBUMIN SERPL-MCNC: 4.1 G/DL (ref 3.5–5.2)
ALBUMIN/GLOB SERPL: 1.2 G/DL
ALP SERPL-CCNC: 59 U/L (ref 39–117)
ALT SERPL W P-5'-P-CCNC: 12 U/L (ref 1–33)
ANION GAP SERPL CALCULATED.3IONS-SCNC: 10.1 MMOL/L (ref 5–15)
AST SERPL-CCNC: 13 U/L (ref 1–32)
BILIRUB SERPL-MCNC: 0.3 MG/DL (ref 0–1.2)
BUN SERPL-MCNC: 9 MG/DL (ref 6–20)
BUN/CREAT SERPL: 9.7 (ref 7–25)
CALCIUM SPEC-SCNC: 9.7 MG/DL (ref 8.6–10.5)
CHLORIDE SERPL-SCNC: 102 MMOL/L (ref 98–107)
CO2 SERPL-SCNC: 25.9 MMOL/L (ref 22–29)
CREAT SERPL-MCNC: 0.93 MG/DL (ref 0.57–1)
CRP SERPL-MCNC: 2.98 MG/DL (ref 0–0.5)
DEPRECATED RDW RBC AUTO: 35.1 FL (ref 37–54)
EGFRCR SERPLBLD CKD-EPI 2021: 83.4 ML/MIN/1.73
ERYTHROCYTE [DISTWIDTH] IN BLOOD BY AUTOMATED COUNT: 11.4 % (ref 12.3–15.4)
GLOBULIN UR ELPH-MCNC: 3.5 GM/DL
GLUCOSE SERPL-MCNC: 108 MG/DL (ref 65–99)
HCT VFR BLD AUTO: 41.3 % (ref 34–46.6)
HGB BLD-MCNC: 14.3 G/DL (ref 12–15.9)
MCH RBC QN AUTO: 29.5 PG (ref 26.6–33)
MCHC RBC AUTO-ENTMCNC: 34.6 G/DL (ref 31.5–35.7)
MCV RBC AUTO: 85.3 FL (ref 79–97)
PLATELET # BLD AUTO: 332 10*3/MM3 (ref 140–450)
PMV BLD AUTO: 9.6 FL (ref 6–12)
POTASSIUM SERPL-SCNC: 3.6 MMOL/L (ref 3.5–5.2)
PROT SERPL-MCNC: 7.6 G/DL (ref 6–8.5)
RBC # BLD AUTO: 4.84 10*6/MM3 (ref 3.77–5.28)
SODIUM SERPL-SCNC: 138 MMOL/L (ref 136–145)
WBC NRBC COR # BLD: 8.06 10*3/MM3 (ref 3.4–10.8)

## 2023-01-09 PROCEDURE — 85027 COMPLETE CBC AUTOMATED: CPT

## 2023-01-09 PROCEDURE — 80053 COMPREHEN METABOLIC PANEL: CPT

## 2023-01-09 PROCEDURE — 86140 C-REACTIVE PROTEIN: CPT

## 2023-01-09 PROCEDURE — 99214 OFFICE O/P EST MOD 30 MIN: CPT | Performed by: INTERNAL MEDICINE
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
## 2023-06-13 ENCOUNTER — LAB (OUTPATIENT)
Dept: LAB | Facility: HOSPITAL | Age: 33
End: 2023-06-13
Payer: COMMERCIAL

## 2023-06-13 ENCOUNTER — OFFICE VISIT (OUTPATIENT)
Dept: GASTROENTEROLOGY | Facility: CLINIC | Age: 33
End: 2023-06-13
Payer: COMMERCIAL

## 2023-06-13 VITALS
DIASTOLIC BLOOD PRESSURE: 80 MMHG | OXYGEN SATURATION: 98 % | HEART RATE: 82 BPM | TEMPERATURE: 97.5 F | BODY MASS INDEX: 37.95 KG/M2 | SYSTOLIC BLOOD PRESSURE: 128 MMHG | WEIGHT: 221.2 LBS

## 2023-06-13 DIAGNOSIS — K50.90 CROHN'S DISEASE WITHOUT COMPLICATION, UNSPECIFIED GASTROINTESTINAL TRACT LOCATION: ICD-10-CM

## 2023-06-13 DIAGNOSIS — K50.90 CROHN'S DISEASE WITHOUT COMPLICATION, UNSPECIFIED GASTROINTESTINAL TRACT LOCATION: Primary | ICD-10-CM

## 2023-06-13 LAB
ALBUMIN SERPL-MCNC: 4.4 G/DL (ref 3.5–5.2)
ALBUMIN/GLOB SERPL: 1.3 G/DL
ALP SERPL-CCNC: 61 U/L (ref 39–117)
ALT SERPL W P-5'-P-CCNC: 13 U/L (ref 1–33)
ANION GAP SERPL CALCULATED.3IONS-SCNC: 11 MMOL/L (ref 5–15)
AST SERPL-CCNC: 13 U/L (ref 1–32)
BASOPHILS # BLD AUTO: 0.05 10*3/MM3 (ref 0–0.2)
BASOPHILS NFR BLD AUTO: 0.5 % (ref 0–1.5)
BILIRUB SERPL-MCNC: 0.4 MG/DL (ref 0–1.2)
BUN SERPL-MCNC: 14 MG/DL (ref 6–20)
BUN/CREAT SERPL: 13.2 (ref 7–25)
CALCIUM SPEC-SCNC: 10.1 MG/DL (ref 8.6–10.5)
CHLORIDE SERPL-SCNC: 102 MMOL/L (ref 98–107)
CO2 SERPL-SCNC: 24 MMOL/L (ref 22–29)
CREAT SERPL-MCNC: 1.06 MG/DL (ref 0.57–1)
CRP SERPL-MCNC: <0.3 MG/DL (ref 0–0.5)
DEPRECATED RDW RBC AUTO: 37.9 FL (ref 37–54)
EGFRCR SERPLBLD CKD-EPI 2021: 71.3 ML/MIN/1.73
EOSINOPHIL # BLD AUTO: 0.11 10*3/MM3 (ref 0–0.4)
EOSINOPHIL NFR BLD AUTO: 1 % (ref 0.3–6.2)
ERYTHROCYTE [DISTWIDTH] IN BLOOD BY AUTOMATED COUNT: 11.7 % (ref 12.3–15.4)
GLOBULIN UR ELPH-MCNC: 3.3 GM/DL
GLUCOSE SERPL-MCNC: 86 MG/DL (ref 65–99)
HCT VFR BLD AUTO: 45.3 % (ref 34–46.6)
HGB BLD-MCNC: 15.6 G/DL (ref 12–15.9)
IMM GRANULOCYTES # BLD AUTO: 0.03 10*3/MM3 (ref 0–0.05)
IMM GRANULOCYTES NFR BLD AUTO: 0.3 % (ref 0–0.5)
LYMPHOCYTES # BLD AUTO: 3.26 10*3/MM3 (ref 0.7–3.1)
LYMPHOCYTES NFR BLD AUTO: 30.4 % (ref 19.6–45.3)
MCH RBC QN AUTO: 29.9 PG (ref 26.6–33)
MCHC RBC AUTO-ENTMCNC: 34.4 G/DL (ref 31.5–35.7)
MCV RBC AUTO: 86.9 FL (ref 79–97)
MONOCYTES # BLD AUTO: 0.77 10*3/MM3 (ref 0.1–0.9)
MONOCYTES NFR BLD AUTO: 7.2 % (ref 5–12)
NEUTROPHILS NFR BLD AUTO: 6.51 10*3/MM3 (ref 1.7–7)
NEUTROPHILS NFR BLD AUTO: 60.6 % (ref 42.7–76)
NRBC BLD AUTO-RTO: 0 /100 WBC (ref 0–0.2)
PLATELET # BLD AUTO: 316 10*3/MM3 (ref 140–450)
PMV BLD AUTO: 9.8 FL (ref 6–12)
POTASSIUM SERPL-SCNC: 4.4 MMOL/L (ref 3.5–5.2)
PROT SERPL-MCNC: 7.7 G/DL (ref 6–8.5)
RBC # BLD AUTO: 5.21 10*6/MM3 (ref 3.77–5.28)
SODIUM SERPL-SCNC: 137 MMOL/L (ref 136–145)
WBC NRBC COR # BLD: 10.73 10*3/MM3 (ref 3.4–10.8)

## 2023-06-13 PROCEDURE — 36415 COLL VENOUS BLD VENIPUNCTURE: CPT

## 2023-06-13 PROCEDURE — 86480 TB TEST CELL IMMUN MEASURE: CPT

## 2023-06-13 PROCEDURE — 80053 COMPREHEN METABOLIC PANEL: CPT

## 2023-06-13 PROCEDURE — 85025 COMPLETE CBC W/AUTO DIFF WBC: CPT

## 2023-06-13 PROCEDURE — 80145 DRUG ASSAY ADALIMUMAB: CPT

## 2023-06-13 PROCEDURE — 99214 OFFICE O/P EST MOD 30 MIN: CPT | Performed by: INTERNAL MEDICINE

## 2023-06-13 PROCEDURE — 86140 C-REACTIVE PROTEIN: CPT

## 2023-06-13 PROCEDURE — 87385 HISTOPLASMA CAPSUL AG IA: CPT

## 2023-06-13 PROCEDURE — 82397 CHEMILUMINESCENT ASSAY: CPT

## 2023-06-13 PROCEDURE — 86682 HELMINTH ANTIBODY: CPT

## 2023-06-13 RX ORDER — ISONIAZID 300 MG/1
1 TABLET ORAL DAILY
COMMUNITY
Start: 2023-05-02

## 2023-06-13 NOTE — PROGRESS NOTES
PCP: Isela Dias MD    Chief Complaint   Patient presents with   • Diarrhea       History of Present Illness:   Corrine Eaton is a 32 y.o. female who presents to GI clinic as a follow up for crohn's disease and ibs.  She has has more liquid stool, 3-5 x daily with crampy type pain. She then has had 4 days without bm with similar crampy pain. No wt loss or gib loss.    Past Medical History:   Diagnosis Date   • Colon polyp        Past Surgical History:   Procedure Laterality Date   • APPENDECTOMY     • CHOLECYSTECTOMY     • COLONOSCOPY           Current Outpatient Medications:   •  Adalimumab (HUMIRA) 40 MG/0.4ML Pen-injector Kit, Inject 40 mg under the skin into the appropriate area as directed Every 14 (Fourteen) Days., Disp: 2 each, Rfl: 11  •  dicyclomine (Bentyl) 10 MG capsule, Take 1 capsule by mouth 3 (Three) Times a Day As Needed (abdominal pain)., Disp: 180 capsule, Rfl: 3  •  isoniazid (NYDRAZID) 300 MG tablet, Take 1 tablet by mouth Daily., Disp: , Rfl:   •  ondansetron ODT (Zofran ODT) 4 MG disintegrating tablet, Place 1 tablet on the tongue Every 6 (Six) Hours As Needed for Nausea or Vomiting., Disp: 90 tablet, Rfl: 3  •  pantoprazole (PROTONIX) 40 MG EC tablet, TAKE 1 TABLET BY MOUTH EVERY DAY, Disp: 90 tablet, Rfl: 3  •  Trintellix 10 MG tablet, , Disp: , Rfl:   •  Adalimumab (HUMIRA) 80 MG/0.8ML injection, Inject 80mg (0.8ml) under the skin on days 1 and 2, and then inject 80mg (0.8ml) under the skin on day 15 (Patient not taking: Reported on 6/13/2023), Disp: 3 each, Rfl: 0  •  citalopram (CeleXA) 20 MG tablet, , Disp: , Rfl:   •  Junel FE 1/20 1-20 MG-MCG per tablet, TAKE 1 TABLET BY MOUTH 1 TIME EACH DAY. (Patient not taking: Reported on 6/13/2023), Disp: , Rfl:   •  mesalamine (APRISO) 0.375 g 24 hr capsule, TAKE 4 CAPSULES BY MOUTH EVERY DAY (Patient not taking: Reported on 6/13/2023), Disp: 180 capsule, Rfl: 3  •  predniSONE (DELTASONE) 10 MG tablet, Take 1 tablet by mouth Daily. Take 40  mg po x 2 weeks and drop by 5 mg a week, Disp: 180 tablet, Rfl: 1  •  prenatal vitamin (prenatal, CLASSIC, vitamin) tablet, Take  by mouth Daily. (Patient not taking: Reported on 6/13/2023), Disp: , Rfl:   •  riFAXIMin (XIFAXAN) 550 MG tablet, Take 1 tablet by mouth 3 (Three) Times a Day. (Patient not taking: Reported on 6/13/2023), Disp: 42 tablet, Rfl: 0    No Known Allergies    Family History   Problem Relation Age of Onset   • Colon cancer Neg Hx    • Colon polyps Neg Hx    • Esophageal cancer Neg Hx        Social History     Socioeconomic History   • Marital status: Single   Tobacco Use   • Smoking status: Never   • Smokeless tobacco: Never   Vaping Use   • Vaping Use: Never used   Substance and Sexual Activity   • Alcohol use: Yes       Review of Systems      Vitals:    06/13/23 1515   BP: 128/80   Pulse: 82   Temp: 97.5 °F (36.4 °C)   SpO2: 98%       Physical Exam  General: well developed, well nourished  A+O x 3 NAD  HEENT: NCAT, pupils equal appearing, sclera appear white  NECK: full ROM  Respiratory: symmetric chest rise, normal effort, normal work of breathing, no overt rales  Abdomen: non-distended  Skin: normal color, no jaundice  Neuro: no tremor, no facial droop  Psych: normal mood and affect      Assessment/Plan  1.) Crohn's disease, ? flare  Workup: colonoscopy 9/2021 with 6 apthous ulcers, MRE normal 2021 without small bowel disease  Cbc, cmp crp: wnl.  H/o surgical extraction of suspected inflammatory crohn's.  No nsaid use  - suspect crohn's disease and ibs- low fodmap diet helped crampy pain but she had more liquid bms  - has periods of no bm on regular diet    Plan:  -  ? IBS flare vs IBD, suspect ibs given change in bowel habits without alarm features of obstruction and given recent house stressors  - assess for flare with humira level, ab, CTEnterography  - stool studies  - continue  humira for now  - cbc, cmp crp  - prn bentyl  - GI soft diet    2.) Medication management  Cbc, cmp    3.)  Immunosuppression  Tb test normal 2022  Cbc    4.) Chronic abdominal pain and diarrhea, suspect component of IBS-D and crohn's disease  Optimize sleep, hydration/diet, exercise and stressors        Santy Estrella MD  6/13/2023

## 2023-06-15 LAB
GAMMA INTERFERON BACKGROUND BLD IA-ACNC: 0.08 IU/ML
M TB IFN-G BLD-IMP: NEGATIVE
M TB IFN-G CD4+ T-CELLS BLD-ACNC: 0.17 IU/ML
M TBIFN-G CD4+ CD8+T-CELLS BLD-ACNC: 0.17 IU/ML
MITOGEN IGNF BLD-ACNC: >10 IU/ML
SERVICE CMNT-IMP: NORMAL

## 2023-06-16 LAB
H CAPSUL AG UR QL IA: <0.5
STRONGYLOIDES IGG SER QL IA: NEGATIVE

## 2023-06-19 LAB
ADALIMUMAB AB SERPL-MCNC: <25 NG/ML
ADALIMUMAB SERPL-MCNC: 7.3 UG/ML

## 2023-07-17 ENCOUNTER — TELEPHONE (OUTPATIENT)
Dept: GASTROENTEROLOGY | Facility: CLINIC | Age: 33
End: 2023-07-17

## 2023-07-17 NOTE — TELEPHONE ENCOUNTER
PATIENT CALLED WITH QUESTIONS ABOUT MEDICATION AND ORDERED LABS. QUESTIONS ABOUT LABS HAVE BEEN ANSWERED. PATIENTS MEDICATION QUESTION WAS THAT SHE CANNOT GET IN WITH HER PCP UNTIL THE BEGINNING OF AUGUST, AND SHE ASKED IF YOU COULD PRESCRIBE AN ANXIETY MEDICATION UNTIL THEN, AS YOU RECOMMENDED IT. HOW TO PROCEED?

## 2023-08-10 ENCOUNTER — PRIOR AUTHORIZATION (OUTPATIENT)
Dept: GASTROENTEROLOGY | Facility: CLINIC | Age: 33
End: 2023-08-10
Payer: COMMERCIAL

## 2023-08-30 RX ORDER — SODIUM PICOSULFATE, MAGNESIUM OXIDE, AND ANHYDROUS CITRIC ACID 10; 3.5; 12 MG/160ML; G/160ML; G/160ML
350 LIQUID ORAL TAKE AS DIRECTED
Qty: 350 ML | Refills: 0 | Status: SHIPPED | OUTPATIENT
Start: 2023-08-30

## 2023-09-01 ENCOUNTER — LAB (OUTPATIENT)
Dept: LAB | Facility: HOSPITAL | Age: 33
End: 2023-09-01
Payer: COMMERCIAL

## 2023-09-01 DIAGNOSIS — K50.90 CROHN'S DISEASE WITHOUT COMPLICATION, UNSPECIFIED GASTROINTESTINAL TRACT LOCATION: ICD-10-CM

## 2023-09-01 LAB — C DIFF TOX GENS STL QL NAA+PROBE: NOT DETECTED

## 2023-09-01 PROCEDURE — 83993 ASSAY FOR CALPROTECTIN FECAL: CPT

## 2023-09-01 PROCEDURE — 87493 C DIFF AMPLIFIED PROBE: CPT

## 2023-09-06 ENCOUNTER — OFFICE VISIT (OUTPATIENT)
Dept: GASTROENTEROLOGY | Facility: CLINIC | Age: 33
End: 2023-09-06
Payer: COMMERCIAL

## 2023-09-06 VITALS
TEMPERATURE: 97.3 F | SYSTOLIC BLOOD PRESSURE: 130 MMHG | BODY MASS INDEX: 38.53 KG/M2 | DIASTOLIC BLOOD PRESSURE: 76 MMHG | OXYGEN SATURATION: 100 % | HEART RATE: 84 BPM | WEIGHT: 224.6 LBS

## 2023-09-06 DIAGNOSIS — K50.90 CROHN'S DISEASE WITHOUT COMPLICATION, UNSPECIFIED GASTROINTESTINAL TRACT LOCATION: Primary | ICD-10-CM

## 2023-09-06 PROCEDURE — 99214 OFFICE O/P EST MOD 30 MIN: CPT | Performed by: INTERNAL MEDICINE

## 2023-09-06 RX ORDER — RIMEGEPANT SULFATE 75 MG/75MG
TABLET, ORALLY DISINTEGRATING ORAL
COMMUNITY

## 2023-09-06 NOTE — PROGRESS NOTES
PCP: Isela Dias MD    No chief complaint on file.      History of Present Illness:   Corrine Eaton is a 32 y.o. female who presents to GI clinic as a follow up for crohn's and ibs. Taking bentyl helps crampy pain and diarrhea but does cause constipation. No gib loss. Is having some pain with bms-typically 4 a day.    Past Medical History:   Diagnosis Date    Colon polyp        Past Surgical History:   Procedure Laterality Date    APPENDECTOMY      CHOLECYSTECTOMY      COLONOSCOPY           Current Outpatient Medications:     Adalimumab (HUMIRA) 80 MG/0.8ML injection, Inject 80mg (0.8ml) under the skin on days 1 and 2, and then inject 80mg (0.8ml) under the skin on day 15, Disp: 3 each, Rfl: 0    citalopram (CeleXA) 20 MG tablet, , Disp: , Rfl:     dicyclomine (Bentyl) 10 MG capsule, Take 1 capsule by mouth 3 (Three) Times a Day As Needed (abdominal pain)., Disp: 180 capsule, Rfl: 3    Humira Pen 40 MG/0.4ML Pen-injector Kit, INJECT 1 PEN UNDER THE SKIN EVERY 14 DAYS., Disp: 2 each, Rfl: 11    isoniazid (NYDRAZID) 300 MG tablet, Take 1 tablet by mouth Daily., Disp: , Rfl:     Junel FE 1/20 1-20 MG-MCG per tablet, , Disp: , Rfl:     mesalamine (APRISO) 0.375 g 24 hr capsule, TAKE 4 CAPSULES BY MOUTH EVERY DAY, Disp: 180 capsule, Rfl: 3    Nurtec 75 MG dispersible tablet, TAKE 1 TABLET BY MOUTH DAILY AS NEEDED (MIGRAINE). DO NOT TAKE MORE THAN 1 TAB IN 24 HRS, Disp: , Rfl:     ondansetron ODT (Zofran ODT) 4 MG disintegrating tablet, Place 1 tablet on the tongue Every 6 (Six) Hours As Needed for Nausea or Vomiting., Disp: 90 tablet, Rfl: 3    pantoprazole (PROTONIX) 40 MG EC tablet, TAKE 1 TABLET BY MOUTH EVERY DAY, Disp: 90 tablet, Rfl: 3    predniSONE (DELTASONE) 10 MG tablet, Take 1 tablet by mouth Daily. Take 40 mg po x 2 weeks and drop by 5 mg a week, Disp: 180 tablet, Rfl: 1    prenatal vitamin (prenatal, CLASSIC, vitamin) tablet, Take  by mouth Daily., Disp: , Rfl:     riFAXIMin (XIFAXAN) 550 MG tablet,  Take 1 tablet by mouth 3 (Three) Times a Day., Disp: 42 tablet, Rfl: 0    Sod Picosulfate-Mag Ox-Cit Acd (Clenpiq) 10-3.5-12 MG-GM -GM/160ML solution, Take 350 mL by mouth Take As Directed., Disp: 350 mL, Rfl: 0    Trintellix 10 MG tablet, , Disp: , Rfl:     No Known Allergies    Family History   Problem Relation Age of Onset    Colon cancer Neg Hx     Colon polyps Neg Hx     Esophageal cancer Neg Hx        Social History     Socioeconomic History    Marital status:    Tobacco Use    Smoking status: Never    Smokeless tobacco: Never   Vaping Use    Vaping Use: Never used   Substance and Sexual Activity    Alcohol use: Yes       Review of Systems  A complete 12 point ros was asked and is negative except for that mentioned above.  In particular:  No fever  No rash  No increased arthralgias  No worsening edema  No cough  No dyspnea  No chest pain      Vitals:    09/06/23 1435   BP: 130/76   Pulse: 84   Temp: 97.3 °F (36.3 °C)   SpO2: 100%       Physical Exam  General: well developed, well nourished  A+O x 3 NAD  HEENT: NCAT, pupils equal appearing, sclera appear white  NECK: full ROM  Respiratory: symmetric chest rise, normal effort, normal work of breathing, no overt rales  Abdomen: non-distended  Skin: normal color, no jaundice  Neuro: no tremor, no facial droop  Psych: normal mood and affect      Assessment/Plan  1.) Crohn's disease, ? flare  Workup: colonoscopy 9/2021 with 6 apthous ulcers, MRE normal 2021 without small bowel disease  Cbc, cmp crp: wnl.  H/o surgical extraction of suspected inflammatory crohn's.  No nsaid use  - suspect crohn's disease and ibs- low fodmap diet helped crampy pain but she had more liquid bms  - has periods of no bm on regular diet  - workup: (-) humira ab, Drug level: 7.3  Plan:  CTE 6/2023: no small bowel stricture, ? proctitis  -  ? IBS flare vs IBD, suspect ibs given change in bowel habits without alarm features of obstruction and given recent house stressors  Plan:    -  prn bentyl  - colonoscopy  - fecal calprotectin pending  - inflammatory markers reviewed: notable for rectal wall thickened appearance on cte, crp.        2.) Medication management  Cbc, cmp    3.) Immunosuppression  Tb test normal 2022  Cbc    4.) Chronic abdominal pain and diarrhea, suspect component of IBS-D and crohn's disease  Optimize sleep, hydration/diet, exercise and stressors  Prn bentyl    5.) Anal pain with defecation  Bid sitz baths,  Colonoscopy assessment        Santy Estrella MD  9/6/2023

## 2023-09-07 LAB — CALPROTECTIN STL-MCNT: 6 UG/G (ref 0–120)

## 2023-09-14 ENCOUNTER — OUTSIDE FACILITY SERVICE (OUTPATIENT)
Dept: GASTROENTEROLOGY | Facility: CLINIC | Age: 33
End: 2023-09-14
Payer: COMMERCIAL

## 2023-09-14 PROCEDURE — 88305 TISSUE EXAM BY PATHOLOGIST: CPT

## 2023-09-14 PROCEDURE — 45380 COLONOSCOPY AND BIOPSY: CPT | Performed by: INTERNAL MEDICINE

## 2023-09-15 ENCOUNTER — LAB REQUISITION (OUTPATIENT)
Dept: LAB | Facility: HOSPITAL | Age: 33
End: 2023-09-15
Payer: COMMERCIAL

## 2023-09-15 DIAGNOSIS — R10.9 UNSPECIFIED ABDOMINAL PAIN: ICD-10-CM

## 2023-09-15 DIAGNOSIS — K64.8 OTHER HEMORRHOIDS: ICD-10-CM

## 2023-09-15 DIAGNOSIS — Z98.0 INTESTINAL BYPASS AND ANASTOMOSIS STATUS: ICD-10-CM

## 2023-09-18 LAB — REF LAB TEST METHOD: NORMAL

## 2023-09-22 ENCOUNTER — OFFICE VISIT (OUTPATIENT)
Dept: FAMILY MEDICINE CLINIC | Facility: CLINIC | Age: 33
End: 2023-09-22
Payer: COMMERCIAL

## 2023-09-22 VITALS
WEIGHT: 222.8 LBS | OXYGEN SATURATION: 100 % | SYSTOLIC BLOOD PRESSURE: 112 MMHG | TEMPERATURE: 97.8 F | RESPIRATION RATE: 16 BRPM | HEIGHT: 64 IN | BODY MASS INDEX: 38.04 KG/M2 | DIASTOLIC BLOOD PRESSURE: 74 MMHG | HEART RATE: 82 BPM

## 2023-09-22 DIAGNOSIS — N30.00 ACUTE CYSTITIS WITHOUT HEMATURIA: Primary | ICD-10-CM

## 2023-09-22 DIAGNOSIS — F33.1 MODERATE EPISODE OF RECURRENT MAJOR DEPRESSIVE DISORDER: ICD-10-CM

## 2023-09-22 DIAGNOSIS — F41.9 ANXIETY: ICD-10-CM

## 2023-09-22 LAB
BILIRUB BLD-MCNC: NEGATIVE MG/DL
CLARITY, POC: CLEAR
COLOR UR: YELLOW
GLUCOSE UR STRIP-MCNC: NEGATIVE MG/DL
KETONES UR QL: NEGATIVE
LEUKOCYTE EST, POC: ABNORMAL
NITRITE UR-MCNC: NEGATIVE MG/ML
PH UR: 5.5 [PH] (ref 5–8)
PROT UR STRIP-MCNC: NEGATIVE MG/DL
RBC # UR STRIP: ABNORMAL /UL
SP GR UR: 1.03 (ref 1–1.03)
UROBILINOGEN UR QL: ABNORMAL

## 2023-09-22 PROCEDURE — 99204 OFFICE O/P NEW MOD 45 MIN: CPT | Performed by: FAMILY MEDICINE

## 2023-09-22 PROCEDURE — 81002 URINALYSIS NONAUTO W/O SCOPE: CPT | Performed by: FAMILY MEDICINE

## 2023-09-22 RX ORDER — SULFAMETHOXAZOLE AND TRIMETHOPRIM 800; 160 MG/1; MG/1
1 TABLET ORAL 2 TIMES DAILY
Qty: 14 TABLET | Refills: 0 | Status: SHIPPED | OUTPATIENT
Start: 2023-09-22

## 2023-09-22 RX ORDER — BUSPIRONE HYDROCHLORIDE 7.5 MG/1
7.5 TABLET ORAL 3 TIMES DAILY
Qty: 90 TABLET | Refills: 1 | Status: SHIPPED | OUTPATIENT
Start: 2023-09-22

## 2023-09-22 RX ORDER — VORTIOXETINE 20 MG/1
20 TABLET, FILM COATED ORAL DAILY
COMMUNITY
Start: 2023-08-01

## 2023-09-22 NOTE — PROGRESS NOTES
Chief Complaint  Establish Care (Transferring all doctors to [facility] providers. Has IBS and Crohn's.), Anxiety (Weekly panic attacks, wondering about a daily anxiety med. Trintellix not as effective as it was before.), and Urinary Tract Infection (Burning, frequency, started less than a week ago.)    Subjective          Corrine Eaton presents to [facility]  History of Present Illness    The patient states she is doing well.   She states Dr. Estrella is her gastroenterologist, treating Crohn's disease.    She states she thinks she may have a UTI that started last week. The patient states she had her colonoscopy last week and thought everything was fine, but she is having the typical symptoms of UTI. She states she has been experiencing burning, and having odor more often.     The patient states she is still having anxiety and she is currently taking Trintellix 20 mg. She states it has worked really well for the past several years. The patient states she has noticed it has been less effective the last 6 months. She states her anxiety has become really severe that in the past, she has been having panic attacks. The patient states she has been having hour long panic attacks multiple times a week and it has been rough. She states she saw her primary care provider and was prescribed  Rexulti. The patient states she stopped because it was too expensive. She states she wants to discuss the possibility of bipolar. The patient states she has a family history of bipolar. She states she has 6 or more months of more mood swings. The patient states sometimes she is really depressed, angry, and irritable then she swings to the anxiety side of things.     The patient reports she is taking Nurtec due to her having migraines. She states she is having them more frequently and she can not afford the medication. The patient denies being on a daily anxiety medication, but she has tried  hydroxyzine. She states it takes too long to take effect when she has a panic attack. The patient states she has tried citalopram and Strattera for depression. She states it did not work. The patient denies ever trying buspirone.     The patient states she had her last Pap smear in 2022. She states she was going to [email or web address]. The patient states she has 3 children and has her tubes tied.     The following portions of the patient's history were reviewed and updated as appropriate: allergies, current medications, past family history, past medical history, past social history, past surgical history, and problem list.    Objective      Physical Exam  Vitals reviewed.   HENT:      Right Ear: Tympanic membrane, ear canal and external ear normal.      Left Ear: Tympanic membrane, ear canal and external ear normal.   Cardiovascular:      Rate and Rhythm: Normal rate.      Heart sounds: Normal heart sounds.   Pulmonary:      Effort: Pulmonary effort is normal.      Breath sounds: Normal breath sounds.   Musculoskeletal:      Cervical back: Neck supple.   Neurological:      Mental Status: She is alert.   Psychiatric:         Mood and Affect: Mood normal.         Behavior: Behavior normal.      Result Review :                Assessment and Plan    Diagnoses and all orders for this visit:    1. Acute cystitis without hematuria (Primary)  -     POC Urinalysis Dipstick  -     Urine Culture - Urine, Urine, Clean Catch  -     sulfamethoxazole-trimethoprim (Bactrim DS) 800-160 MG per tablet; Take 1 tablet by mouth 2 (Two) Times a Day.  Dispense: 14 tablet; Refill: 0    2. Anxiety  -     busPIRone (BUSPAR) 7.5 MG tablet; Take 1 tablet by mouth 3 (Three) Times a Day.  Dispense: 90 tablet; Refill: 1    3. Moderate episode of recurrent major depressive disorder    Buspirone added to treatment to improve anxiety.  UA abnormal, will send for culture.  Start Bactrim for treatment of acute cystitis.    Follow Up   Return in about 6 weeks (around  11/3/2023) for Recheck.  Patient was given instructions and counseling regarding her condition or for health maintenance advice. Please see specific information pulled into the AVS if appropriate.   Transcribed from ambient dictation for Rukhsana Hilliard DO by Krysta Bruner.  09/22/23   15:04 EDT    Patient or patient representative verbalized consent to the visit recording.  I have personally performed the services described in this document as transcribed by the above individual, and it is both accurate and complete.

## 2023-09-30 LAB
BACTERIA UR CULT: ABNORMAL
OTHER ANTIBIOTIC SUSC ISLT: ABNORMAL

## 2023-11-03 ENCOUNTER — OFFICE VISIT (OUTPATIENT)
Dept: FAMILY MEDICINE CLINIC | Facility: CLINIC | Age: 33
End: 2023-11-03
Payer: COMMERCIAL

## 2023-11-03 VITALS
HEIGHT: 64 IN | SYSTOLIC BLOOD PRESSURE: 120 MMHG | BODY MASS INDEX: 39.09 KG/M2 | HEART RATE: 84 BPM | DIASTOLIC BLOOD PRESSURE: 80 MMHG | RESPIRATION RATE: 20 BRPM | WEIGHT: 229 LBS | OXYGEN SATURATION: 100 % | TEMPERATURE: 97.1 F

## 2023-11-03 DIAGNOSIS — F51.01 PRIMARY INSOMNIA: ICD-10-CM

## 2023-11-03 DIAGNOSIS — F41.9 ANXIETY: Primary | ICD-10-CM

## 2023-11-03 DIAGNOSIS — F39 MOOD DISORDER: ICD-10-CM

## 2023-11-03 DIAGNOSIS — R11.0 NAUSEA: ICD-10-CM

## 2023-11-03 DIAGNOSIS — F33.1 MODERATE EPISODE OF RECURRENT MAJOR DEPRESSIVE DISORDER: ICD-10-CM

## 2023-11-03 PROCEDURE — 99214 OFFICE O/P EST MOD 30 MIN: CPT | Performed by: FAMILY MEDICINE

## 2023-11-03 RX ORDER — LAMOTRIGINE 25 MG/1
TABLET ORAL
Qty: 60 TABLET | Refills: 1 | Status: SHIPPED | OUTPATIENT
Start: 2023-11-03

## 2023-11-03 RX ORDER — ONDANSETRON 4 MG/1
4 TABLET, ORALLY DISINTEGRATING ORAL EVERY 8 HOURS PRN
Qty: 30 TABLET | Refills: 0 | Status: SHIPPED | OUTPATIENT
Start: 2023-11-03

## 2023-11-03 NOTE — PROGRESS NOTES
Chief Complaint  6 wk f/u (Trintellix is causing vomiting. (Thinks it's due to the combination of it with her new meds).)    Subjective          Corrine Eaton presents to [facility]  History of Present Illness    She has developed nausea, which started after adding buspirone to Trintellix. She had taken the same dose prior to adding buspirone and had no side effect.  She is concerned about possible bipolar disorder. Feels that depression is getting worse and has noticed mood swings.   Having difficulty sleeping, which is a chronic condition. She treats with OTC medication, averaging 4 hours of sleep per night     The following portions of the patient's history were reviewed and updated as appropriate: allergies, current medications, past family history, past medical history, past social history, past surgical history, and problem list.    Objective      Physical Exam  Vitals reviewed.   Cardiovascular:      Rate and Rhythm: Normal rate.      Heart sounds: Normal heart sounds.   Pulmonary:      Effort: Pulmonary effort is normal.      Breath sounds: Normal breath sounds.   Neurological:      Mental Status: She is alert.   Psychiatric:         Mood and Affect: Mood normal.         Behavior: Behavior normal.        Result Review :                Assessment and Plan    Diagnoses and all orders for this visit:    1. Anxiety (Primary)  -     lamoTRIgine (LaMICtal) 25 MG tablet; Take 1 tab po qd x 14 days, then increased to 2 tabs po qd  Dispense: 60 tablet; Refill: 1    2. Mood disorder  -     lamoTRIgine (LaMICtal) 25 MG tablet; Take 1 tab po qd x 14 days, then increased to 2 tabs po qd  Dispense: 60 tablet; Refill: 1    3. Moderate episode of recurrent major depressive disorder  -     lamoTRIgine (LaMICtal) 25 MG tablet; Take 1 tab po qd x 14 days, then increased to 2 tabs po qd  Dispense: 60 tablet; Refill: 1    4. Primary insomnia    5. Nausea  -     ondansetron ODT (Zofran ODT) 4  MG disintegrating tablet; Place 1 tablet on the tongue Every 8 (Eight) Hours As Needed for Nausea or Vomiting.  Dispense: 30 tablet; Refill: 0    Will stop buspirone due to nausea. Lamotrigine added to treatment.   She will continue OTC sleep aids. If no improvement of insomnia at follow up, consider prescription therapy.       Follow Up   Return in about 6 weeks (around 12/15/2023) for Recheck ok for telehealth .  Patient was given instructions and counseling regarding her condition or for health maintenance advice. Please see specific information pulled into the AVS if appropriate.

## 2023-12-08 RX ORDER — PANTOPRAZOLE SODIUM 40 MG/1
TABLET, DELAYED RELEASE ORAL
Qty: 90 TABLET | Refills: 3 | Status: SHIPPED | OUTPATIENT
Start: 2023-12-08

## 2023-12-08 NOTE — TELEPHONE ENCOUNTER
Rx Refill Note  Pending Prescriptions:                       Disp   Refills    pantoprazole (PROTONIX) 40 MG EC tablet [P*90 tab*3        Sig: TAKE 1 TABLET BY MOUTH EVERY DAY    Last office visit with prescribing clinician: 9/6/2023   Last telemedicine visit with prescribing clinician: Visit date not found   Next office visit with prescribing clinician: Visit date not found         Jacy Bashir MA  12/08/23, 08:06 EST

## 2023-12-15 ENCOUNTER — TELEMEDICINE (OUTPATIENT)
Dept: FAMILY MEDICINE CLINIC | Facility: CLINIC | Age: 33
End: 2023-12-15
Payer: COMMERCIAL

## 2023-12-15 DIAGNOSIS — F41.9 ANXIETY: ICD-10-CM

## 2023-12-15 DIAGNOSIS — F33.1 MODERATE EPISODE OF RECURRENT MAJOR DEPRESSIVE DISORDER: Primary | ICD-10-CM

## 2023-12-15 DIAGNOSIS — F39 MOOD DISORDER: ICD-10-CM

## 2023-12-15 DIAGNOSIS — G43.809 MIGRAINE VARIANT: ICD-10-CM

## 2023-12-15 PROCEDURE — 99214 OFFICE O/P EST MOD 30 MIN: CPT | Performed by: FAMILY MEDICINE

## 2023-12-15 RX ORDER — FLUOXETINE HYDROCHLORIDE 20 MG/1
20 CAPSULE ORAL DAILY
Qty: 30 CAPSULE | Refills: 1 | Status: SHIPPED | OUTPATIENT
Start: 2023-12-15

## 2023-12-15 RX ORDER — LAMOTRIGINE 100 MG/1
100 TABLET ORAL DAILY
Qty: 90 TABLET | Refills: 1 | Status: SHIPPED | OUTPATIENT
Start: 2023-12-15

## 2023-12-15 NOTE — PROGRESS NOTES
Chief Complaint  6 week f/u (Anxiety and trintellix is still making her throw up,  Migraines getting worse  )  You have chosen to receive care through a telehealth visit.  Do you consent to use a video/audio connection for your medical care today? Yes     Patient location: Home  Provider location: [facility]  Technology used: Diego Blanca          Corrine Eaton presents to [facility]  History of Present Illness  Follow up since adding lamotrigine to treatment. She has noticed improvement of mood and sleep since starting lamotrigine.   We stopped buspirone, thinking it was causing nausea. However, she is still having nausea and vomiting. It is correlated with taking Trintellix. She has failed Celexa and Strattera. She hasn't taken Trintellix in a few days and not having withdrawal symptoms.     She has migraines, started in teens and correlated to her period. Those improved, until the last 2 years. No specific triggers.   Her mother has trigeminal neuralgia. When she has migraine, pain goes through right side of face, up to scalp, and down into right shoulder. Pain described as burning and sharp pain.  Takes Nurtec, however it does not resolve migraine.     The following portions of the patient's history were reviewed and updated as appropriate: allergies, current medications, past family history, past medical history, past social history, past surgical history, and problem list.    Objective      Physical Exam  HENT:      Head: Normocephalic.   Pulmonary:      Effort: Pulmonary effort is normal. No respiratory distress.   Neurological:      Mental Status: She is alert.        Result Review :                Assessment and Plan    Diagnoses and all orders for this visit:    1. Moderate episode of recurrent major depressive disorder (Primary)  Comments:  She will remain off from Trintellix due to nausea and vomiting. Treatment changed to  fluoxetine.  Orders:  -     lamoTRIgine (LaMICtal) 100 MG tablet; Take 1 tablet by mouth Daily.  Dispense: 90 tablet; Refill: 1  -     FLUoxetine (PROzac) 20 MG capsule; Take 1 capsule by mouth Daily.  Dispense: 30 capsule; Refill: 1    2. Mood disorder  Comments:  Dose of lamitrigine increased  Orders:  -     lamoTRIgine (LaMICtal) 100 MG tablet; Take 1 tablet by mouth Daily.  Dispense: 90 tablet; Refill: 1  -     FLUoxetine (PROzac) 20 MG capsule; Take 1 capsule by mouth Daily.  Dispense: 30 capsule; Refill: 1    3. Anxiety  Comments:  She will remain off from Trintellix due to nausea and vomiting. Treatment changed to fluoxetine.  Orders:  -     lamoTRIgine (LaMICtal) 100 MG tablet; Take 1 tablet by mouth Daily.  Dispense: 90 tablet; Refill: 1  -     FLUoxetine (PROzac) 20 MG capsule; Take 1 capsule by mouth Daily.  Dispense: 30 capsule; Refill: 1    4. Migraine variant  -     Ambulatory Referral to Neurology      I spent 14 minutes caring for Corrine on this date of service. This time includes time spent by me in the following activities:ordering medications, tests, or procedures, referring and communicating with other health care professionals , and documenting information in the medical record  Follow Up   Return in about 6 weeks (around 1/26/2024) for Recheck.  Patient was given instructions and counseling regarding her condition or for health maintenance advice. Please see specific information pulled into the AVS if appropriate.

## 2024-01-02 ENCOUNTER — TELEPHONE (OUTPATIENT)
Dept: FAMILY MEDICINE CLINIC | Facility: CLINIC | Age: 34
End: 2024-01-02
Payer: COMMERCIAL

## 2024-01-02 NOTE — TELEPHONE ENCOUNTER
Corrine called in stating that she had a neurology appointment around march 14th but her migraines are very debilitating right now. She has almost had to go to the ER a few times. She wanted to know if there was anything that could be called in for her until she she sees the neurologist.

## 2024-01-02 NOTE — TELEPHONE ENCOUNTER
Has she tried triptan class of medication to relieve migraine (Imitrex, Maxalt)? Nurtec is on med list, but she previously said it didn't relieve her headache.

## 2024-01-04 NOTE — TELEPHONE ENCOUNTER
Maxalt is in same class as Imitrex.   If we have samples of Ubrelvy, ok to provide to her. 50 or 100 mg is ok. This will take the place of Nurtec.

## 2024-01-15 ENCOUNTER — OFFICE VISIT (OUTPATIENT)
Dept: FAMILY MEDICINE CLINIC | Facility: CLINIC | Age: 34
End: 2024-01-15
Payer: COMMERCIAL

## 2024-01-15 VITALS
HEART RATE: 78 BPM | WEIGHT: 234.2 LBS | SYSTOLIC BLOOD PRESSURE: 124 MMHG | OXYGEN SATURATION: 99 % | DIASTOLIC BLOOD PRESSURE: 80 MMHG | HEIGHT: 64 IN | TEMPERATURE: 97.7 F | RESPIRATION RATE: 16 BRPM | BODY MASS INDEX: 39.98 KG/M2

## 2024-01-15 DIAGNOSIS — R51.9 CHRONIC INTRACTABLE HEADACHE, UNSPECIFIED HEADACHE TYPE: Primary | ICD-10-CM

## 2024-01-15 DIAGNOSIS — G89.29 CHRONIC INTRACTABLE HEADACHE, UNSPECIFIED HEADACHE TYPE: Primary | ICD-10-CM

## 2024-01-15 PROCEDURE — 99214 OFFICE O/P EST MOD 30 MIN: CPT | Performed by: FAMILY MEDICINE

## 2024-01-15 RX ORDER — BUTALBITAL, ACETAMINOPHEN AND CAFFEINE 50; 325; 40 MG/1; MG/1; MG/1
1 TABLET ORAL EVERY 6 HOURS PRN
Qty: 10 TABLET | Refills: 1 | Status: SHIPPED | OUTPATIENT
Start: 2024-01-15

## 2024-01-15 NOTE — PROGRESS NOTES
Chief Complaint  ER f/u ([facility], Headaches/migraines - out of control, constant (manageable most days, but about once a week they are debilitating). Has neurology appt in March.)    Subjective          Corrine Eaton presents to [facility]  History of Present Illness    Corrine Eaton 33-year-old female who presents for evaluation of chronic headache.    She experiences persistent headaches. On some days it is manageable, but she is never entirely free from a headache. Prior to Christmas, the intervals between the headaches were longer, but since Christmas they have become more frequent. Her last episode was so severe that she had to be carried into the ER. It was the third one in 3 weeks. She had one on David day, 12/25/2023, New Year's Nancy, 12/31/2023 and then the last one was 6 days later. It was one of the unmanageable headaches. It started at 2:00 AM and she was still writhing in bed at 4:00 AM in the morning. It had a knife-like sharpness across her right temple and radiated to her eye, across the back of her head, into her shoulder, and down her upper extremity. In the ER she received a migraine cocktail, which gave her some relief. Imaging was not done.   Her typical daily headache is not constant, she describes it as lurking sensation with sporadic stabbing pains that last for few seconds and then subside, it is not debilitating. Her headaches started when she was 15 years old. It is always on the right side, so she thought she was having migraine headaches.   She has a neurology appointment scheduled in 03/2024, but would like something sooner. She has tried over-the-counter medications. She can not take NSAIDs due to Crohn's. Tylenol does not help her. She does take THC and ACD, which improves her headache the most.     The following portions of the patient's history were reviewed and updated as appropriate: allergies, current medications, past family history, past  medical history, past social history, past surgical history, and problem list.    Objective      Physical Exam  Vitals reviewed.   HENT:      Right Ear: Tympanic membrane, ear canal and external ear normal.   Cardiovascular:      Rate and Rhythm: Normal rate.      Heart sounds: Normal heart sounds.   Pulmonary:      Effort: Pulmonary effort is normal.      Breath sounds: Normal breath sounds.   Neurological:      Mental Status: She is alert.        Result Review :                Assessment and Plan    Diagnoses and all orders for this visit:    1. Chronic intractable headache, unspecified headache type (Primary)  -     Ambulatory Referral to Neurology  -     butalbital-acetaminophen-caffeine (FIORICET, ESGIC) -40 MG per tablet; Take 1 tablet by mouth Every 6 (Six) Hours As Needed for Headache.  Dispense: 10 tablet; Refill: 1    Persistent headaches  - Urgent referral place to neurology. Based on her symptoms, she may have Trigeminal Neuralgia. In the meantime, Fioricet is prescribed to be taken as needed. She is aware that it is a controlled drug and may cause drowsiness. SYLWIA query complete. Treatment plan to include limited course of prescribed  controlled substance. Risks including addiction, benefits, and alternatives presented to patient.         Follow Up   Return if symptoms worsen or fail to improve.  Patient was given instructions and counseling regarding her condition or for health maintenance advice. Please see specific information pulled into the AVS if appropriate.     Transcribed from ambient dictation for Rukhsana Hilliard DO by Natalie Bird.  01/15/24   12:22 EST    Patient or patient representative verbalized consent to the visit recording.  I have personally performed the services described in this document as transcribed by the above individual, and it is both accurate and complete.

## 2024-02-10 DIAGNOSIS — F33.1 MODERATE EPISODE OF RECURRENT MAJOR DEPRESSIVE DISORDER: ICD-10-CM

## 2024-02-10 DIAGNOSIS — F41.9 ANXIETY: ICD-10-CM

## 2024-02-10 DIAGNOSIS — F39 MOOD DISORDER: ICD-10-CM

## 2024-02-12 RX ORDER — FLUOXETINE HYDROCHLORIDE 20 MG/1
20 CAPSULE ORAL DAILY
Qty: 30 CAPSULE | Refills: 1 | Status: SHIPPED | OUTPATIENT
Start: 2024-02-12

## 2024-03-13 ENCOUNTER — OFFICE VISIT (OUTPATIENT)
Dept: NEUROLOGY | Facility: CLINIC | Age: 34
End: 2024-03-13
Payer: COMMERCIAL

## 2024-03-13 VITALS
HEIGHT: 64 IN | HEART RATE: 90 BPM | BODY MASS INDEX: 37.39 KG/M2 | WEIGHT: 219 LBS | SYSTOLIC BLOOD PRESSURE: 122 MMHG | OXYGEN SATURATION: 99 % | DIASTOLIC BLOOD PRESSURE: 78 MMHG

## 2024-03-13 DIAGNOSIS — G43.C0 PERIODIC HEADACHE SYNDROME, NOT INTRACTABLE: Primary | ICD-10-CM

## 2024-03-13 RX ORDER — GABAPENTIN 300 MG/1
1 CAPSULE ORAL EVERY 12 HOURS SCHEDULED
COMMUNITY
Start: 2024-02-06

## 2024-03-13 RX ORDER — PROCHLORPERAZINE MALEATE 10 MG
1 TABLET ORAL 3 TIMES DAILY
COMMUNITY
Start: 2024-02-02

## 2024-03-13 NOTE — PROGRESS NOTES
Subjective   Patient ID: Corrine Eaton is a 33 y.o. female     Chief Complaint   Patient presents with    Migraine     Topamax, depakote, imitrex, nurtec - none of these worked        History of Present Illness    33 y.o. female referred by Dr Rukhsana Hilliard for migraines.     HA frequency is daily,  Constant for 4 - 5 months.  Located behind OD.  Shooting pains with eye movement,  Right side of face N/T.  Moderate to severe intensity.     2020 HA worsened after Covid and giving birth.      Pulsatile tinnitus 1 - 2 times a week.   Reviewed medical records:    HA started in teens.  Worse with period.  Located on right side of head.  Sharp burning pain.      Preventatives:  TPM, VPA, GBP,   Abortive:  Imitrex, Nurtec   Past Medical History:   Diagnosis Date    ADHD (attention deficit hyperactivity disorder)     Anxiety     Cholelithiasis 11/2015    Colon polyp     Depression     GERD (gastroesophageal reflux disease)     Headache     Inflammatory bowel disease     Irritable bowel syndrome 09/2010    Low back pain     Peptic ulceration 9/2021     Family History   Problem Relation Age of Onset    Hyperlipidemia Mother     Anxiety disorder Father     Hyperlipidemia Father     Arthritis Maternal Grandmother     Depression Maternal Grandmother     Mental illness Maternal Grandmother     Other Paternal Grandmother         Parkinsons    Colon cancer Neg Hx     Colon polyps Neg Hx     Esophageal cancer Neg Hx      Social History     Socioeconomic History    Marital status:    Tobacco Use    Smoking status: Never     Passive exposure: Never    Smokeless tobacco: Never   Vaping Use    Vaping status: Never Used   Substance and Sexual Activity    Alcohol use: Yes     Alcohol/week: 5.0 standard drinks of alcohol     Types: 5 Glasses of wine per week    Drug use: Never    Sexual activity: Yes     Partners: Male     Birth control/protection: Surgical       Review of Systems   Constitutional:  Negative for activity change,  "fatigue and unexpected weight change.   HENT:  Negative for tinnitus and trouble swallowing.    Eyes:  Negative for photophobia and visual disturbance.   Respiratory:  Negative for apnea, cough and choking.    Cardiovascular:  Negative for leg swelling.   Gastrointestinal:  Positive for abdominal distention, abdominal pain and nausea. Negative for vomiting.   Endocrine: Negative for cold intolerance and heat intolerance.   Genitourinary:  Negative for difficulty urinating, frequency, menstrual problem and urgency.   Musculoskeletal:  Positive for back pain. Negative for gait problem, myalgias and neck pain.   Skin:  Negative for color change and rash.   Allergic/Immunologic: Negative for immunocompromised state.   Neurological:  Positive for headaches. Negative for dizziness, tremors, seizures, syncope, facial asymmetry, speech difficulty, weakness, light-headedness and numbness.   Hematological:  Negative for adenopathy. Does not bruise/bleed easily.   Psychiatric/Behavioral:  Positive for dysphoric mood. Negative for behavioral problems, confusion, decreased concentration, hallucinations and sleep disturbance. The patient is nervous/anxious.        Objective     Vitals:    03/13/24 1448   BP: 122/78   Pulse: 90   SpO2: 99%   Weight: 99.3 kg (219 lb)   Height: 162.6 cm (64.02\")       Neurologic Exam     Mental Status   Oriented to person, place, and time.   Speech: speech is normal   Level of consciousness: alert  Knowledge: good and consistent with education.   Normal comprehension.     Cranial Nerves   Cranial nerves II through XII intact.     CN II   Visual fields full to confrontation.   Visual acuity: normal  Right visual field deficit: none  Left visual field deficit: none     CN III, IV, VI   Pupils are equal, round, and reactive to light.  Extraocular motions are normal.   Nystagmus: none   Diplopia: none  Ophthalmoparesis: none  Upgaze: normal  Downgaze: normal  Conjugate gaze: present    CN V   Facial " sensation intact.   Right corneal reflex: normal  Left corneal reflex: normal    CN VII   Right facial weakness: none  Left facial weakness: none    CN VIII   Hearing: intact    CN IX, X   Palate: symmetric  Right gag reflex: normal  Left gag reflex: normal    CN XI   Right sternocleidomastoid strength: normal  Left sternocleidomastoid strength: normal    CN XII   Tongue: not atrophic  Fasciculations: absent  Tongue deviation: none    Motor Exam   Muscle bulk: normal  Overall muscle tone: normal    Strength   Strength 5/5 throughout.     Sensory Exam   Light touch normal.     Gait, Coordination, and Reflexes     Gait  Gait: normal    Tremor   Resting tremor: absent  Intention tremor: absent  Action tremor: absent    Reflexes   Reflexes 2+ except as noted.       Physical Exam  Eyes:      Extraocular Movements: EOM normal.      Pupils: Pupils are equal, round, and reactive to light.   Neurological:      Mental Status: She is oriented to person, place, and time.      Cranial Nerves: Cranial nerves 2-12 are intact.      Motor: Motor strength is normal.     Gait: Gait is intact.   Psychiatric:         Speech: Speech normal.         Office Visit on 09/22/2023   Component Date Value Ref Range Status    Color 09/22/2023 Yellow  Yellow, Straw, Dark Yellow, Catherine Final    Clarity, UA 09/22/2023 Clear  Clear Final    Glucose, UA 09/22/2023 Negative  Negative mg/dL Final    Bilirubin 09/22/2023 Negative  Negative Final    Ketones, UA 09/22/2023 Negative  Negative Final    Specific Gravity  09/22/2023 1.030  1.005 - 1.030 Final    Blood, UA 09/22/2023 3+ (A)  Negative Final    pH, Urine 09/22/2023 5.5  5.0 - 8.0 Final    Protein, POC 09/22/2023 Negative  Negative mg/dL Final    Urobilinogen, UA 09/22/2023 0.2 E.U./dL  Normal, 0.2 E.U./dL Final    Leukocytes 09/22/2023 Trace (A)  Negative Final    Nitrite, UA 09/22/2023 Negative  Negative Final    Urine Culture 09/22/2023 Final report (A)   Final    Result 1 09/22/2023  Escherichia coli (A)   Final    Comment: Greater than 100,000 colony forming units per mL  Cefazolin <=4 ug/mL  Cefazolin with an DANNIE <=16 predicts susceptibility to the oral agents  cefaclor, cefdinir, cefpodoxime, cefprozil, cefuroxime, cephalexin,  and loracarbef when used for therapy of uncomplicated urinary tract  infections due to E. coli, Klebsiella pneumoniae, and Proteus  mirabilis.      Result 2 09/22/2023 Comment   Final    Comment: Mixed urogenital farzana  25,000-50,000 colony forming units per mL      Susceptibility Testing 09/22/2023 Comment   Final    Comment:       ** S = Susceptible; I = Intermediate; R = Resistant **                     P = Positive; N = Negative              MICS are expressed in micrograms per mL     Antibiotic                 RSLT#1    RSLT#2    RSLT#3    RSLT#4  Amoxicillin/Clavulanic Acid    S  Ampicillin                     S  Cefepime                       S  Ceftriaxone                    S  Cefuroxime                     S  Ciprofloxacin                  S  Ertapenem                      S  Gentamicin                     S  Imipenem                       S  Levofloxacin                   S  Meropenem                      S  Nitrofurantoin                 S  Piperacillin/Tazobactam        S  Tetracycline                   S  Tobramycin                     S  Trimethoprim/Sulfa             S           Assessment & Plan     Problem List Items Addressed This Visit          Neuro    Periodic headache syndrome, not intractable - Primary    Current Assessment & Plan     Suspect IIH     MRI/MRV     LP                Relevant Medications    Humira Pen 40 MG/0.4ML Pen-injector Kit    lamoTRIgine (LaMICtal) 100 MG tablet    ubrogepant (Ubrelvy) 100 MG tablet    butalbital-acetaminophen-caffeine (FIORICET, ESGIC) -40 MG per tablet    FLUoxetine (PROzac) 20 MG capsule    gabapentin (NEURONTIN) 300 MG capsule    Other Relevant Orders    MRI Brain With & Without Contrast    MRI  Brain With & Without Contrast    MRI angiogram venogram head    IR Lumbar Puncture Diagnostic          No follow-ups on file.

## 2024-04-02 ENCOUNTER — HOSPITAL ENCOUNTER (OUTPATIENT)
Dept: MRI IMAGING | Facility: HOSPITAL | Age: 34
Discharge: HOME | End: 2024-04-02
Admitting: PSYCHIATRY & NEUROLOGY
Payer: COMMERCIAL

## 2024-04-02 DIAGNOSIS — G43.C0 PERIODIC HEADACHE SYNDROME, NOT INTRACTABLE: ICD-10-CM

## 2024-04-02 PROCEDURE — 70544 MR ANGIOGRAPHY HEAD W/O DYE: CPT

## 2024-04-03 ENCOUNTER — TELEPHONE (OUTPATIENT)
Dept: NEUROLOGY | Facility: CLINIC | Age: 34
End: 2024-04-03
Payer: COMMERCIAL

## 2024-04-03 NOTE — TELEPHONE ENCOUNTER
----- Message from Yvan Still MD sent at 4/3/2024 10:27 AM EDT -----  Can you check to see if pt has scheduled her LP

## 2024-04-04 ENCOUNTER — TELEPHONE (OUTPATIENT)
Dept: NEUROLOGY | Facility: CLINIC | Age: 34
End: 2024-04-04
Payer: COMMERCIAL

## 2024-04-08 ENCOUNTER — HOSPITAL ENCOUNTER (OUTPATIENT)
Dept: GENERAL RADIOLOGY | Facility: HOSPITAL | Age: 34
Discharge: HOME | End: 2024-04-08
Admitting: PSYCHIATRY & NEUROLOGY
Payer: COMMERCIAL

## 2024-04-08 VITALS
SYSTOLIC BLOOD PRESSURE: 132 MMHG | WEIGHT: 223.4 LBS | HEART RATE: 78 BPM | TEMPERATURE: 97 F | RESPIRATION RATE: 16 BRPM | DIASTOLIC BLOOD PRESSURE: 92 MMHG | BODY MASS INDEX: 38.14 KG/M2 | HEIGHT: 64 IN | OXYGEN SATURATION: 98 %

## 2024-04-08 DIAGNOSIS — G43.C0 PERIODIC HEADACHE SYNDROME, NOT INTRACTABLE: ICD-10-CM

## 2024-04-08 LAB
APPEARANCE CSF: CLEAR
APPEARANCE CSF: CLEAR
COLOR CSF: COLORLESS
COLOR CSF: COLORLESS
GLUCOSE CSF-MCNC: 56 MG/DL (ref 40–70)
PROT CSF-MCNC: 14.6 MG/DL (ref 15–45)
RBC # CSF MANUAL: 0 /MM3 (ref 0–5)
RBC # CSF MANUAL: 1 /MM3 (ref 0–5)
SPECIMEN VOL CSF: 8.5 ML
SPECIMEN VOL CSF: 8.5 ML
TUBE # CSF: 1
TUBE # CSF: 4
WBC # CSF MANUAL: 1 /MM3 (ref 0–5)
WBC # CSF MANUAL: 2 /MM3 (ref 0–5)

## 2024-04-08 PROCEDURE — 87205 SMEAR GRAM STAIN: CPT | Performed by: PSYCHIATRY & NEUROLOGY

## 2024-04-08 PROCEDURE — 86592 SYPHILIS TEST NON-TREP QUAL: CPT | Performed by: PSYCHIATRY & NEUROLOGY

## 2024-04-08 PROCEDURE — 87070 CULTURE OTHR SPECIMN AEROBIC: CPT | Performed by: PSYCHIATRY & NEUROLOGY

## 2024-04-08 PROCEDURE — 82164 ANGIOTENSIN I ENZYME TEST: CPT | Performed by: PSYCHIATRY & NEUROLOGY

## 2024-04-08 PROCEDURE — 84157 ASSAY OF PROTEIN OTHER: CPT | Performed by: PSYCHIATRY & NEUROLOGY

## 2024-04-08 PROCEDURE — 83916 OLIGOCLONAL BANDS: CPT | Performed by: PSYCHIATRY & NEUROLOGY

## 2024-04-08 PROCEDURE — 82945 GLUCOSE OTHER FLUID: CPT | Performed by: PSYCHIATRY & NEUROLOGY

## 2024-04-08 PROCEDURE — 89050 BODY FLUID CELL COUNT: CPT | Performed by: PSYCHIATRY & NEUROLOGY

## 2024-04-08 PROCEDURE — 25010000002 LIDOCAINE 1 % SOLUTION: Performed by: PHYSICIAN ASSISTANT

## 2024-04-08 PROCEDURE — 82040 ASSAY OF SERUM ALBUMIN: CPT | Performed by: PSYCHIATRY & NEUROLOGY

## 2024-04-08 PROCEDURE — 82784 ASSAY IGA/IGD/IGG/IGM EACH: CPT | Performed by: PSYCHIATRY & NEUROLOGY

## 2024-04-08 PROCEDURE — 82042 OTHER SOURCE ALBUMIN QUAN EA: CPT | Performed by: PSYCHIATRY & NEUROLOGY

## 2024-04-08 PROCEDURE — 87015 SPECIMEN INFECT AGNT CONCNTJ: CPT | Performed by: PSYCHIATRY & NEUROLOGY

## 2024-04-08 RX ORDER — LIDOCAINE HYDROCHLORIDE 10 MG/ML
5 INJECTION, SOLUTION INFILTRATION; PERINEURAL ONCE
Status: COMPLETED | OUTPATIENT
Start: 2024-04-08 | End: 2024-04-08

## 2024-04-08 RX ADMIN — LIDOCAINE HYDROCHLORIDE 5 ML: 10 INJECTION, SOLUTION INFILTRATION; PERINEURAL at 09:25

## 2024-04-08 NOTE — POST-PROCEDURE NOTE
Radiology Procedure    Pre-procedure: procedure, risks discussed with patient. Patient indicated understanding and consented to procedure.     Procedure Performed: lumbar puncture     IV Sedation and/or Anesthesia:  No    Complications: none    Preliminary Findings: opening pressure 12cm H2O    Specimen Removed: 8cc clear, colorless CSF    Estimated Blood Loss:  0  ml    Post-Procedure Diagnosis: pending    Post-Procedure Plan: encourage fluids, bed rest x 2hours    Standard Discharge Instructions Given:yes     MARYANNE Downs  04/08/24  09:20 EDT

## 2024-04-08 NOTE — DISCHARGE INSTRUCTIONS
You will need to rest in a reclined position for the remainder of today.  Avoid strenuous activities for the next 48 hours.  You may take the bandage band aid off and shower tomorrow.  Avoid tub baths for the next 4 to 5 days.  Do not drink alcohol for 24 hours, or as told by your doctor.  Drink enough fluid to keep your urine clear or pale yellow.  You will need to drink at least 12 ounces of fluid every hour while awake for the next 3 days.  Include caffeinated beverages especially if you are having a headache.  DO NOT DRIVE THE DAY OF YOUR PROCEDURE.

## 2024-04-09 ENCOUNTER — TELEPHONE (OUTPATIENT)
Dept: NEUROLOGY | Facility: CLINIC | Age: 34
End: 2024-04-09
Payer: COMMERCIAL

## 2024-04-09 LAB
ALB CSF/SERPL: 2 {RATIO} (ref 0–8)
ALBUMIN CSF-MCNC: 8 MG/DL (ref 7–29)
ALBUMIN SERPL-MCNC: 3.9 G/DL (ref 3.9–4.9)
IGG CSF-MCNC: 1.1 MG/DL (ref 0–6.7)
IGG SERPL-MCNC: 1101 MG/DL (ref 586–1602)
IGG SYNTH RATE SER+CSF CALC-MRATE: -4 MG/DAY
IGG/ALB CLEAR SER+CSF-RTO: 0.5 (ref 0–0.7)
IGG/ALB CSF: 0.14 {RATIO} (ref 0–0.25)
OLIGOCLONAL BANDS.IT SER+CSF QL: NORMAL

## 2024-04-10 ENCOUNTER — TELEPHONE (OUTPATIENT)
Dept: NEUROLOGY | Facility: CLINIC | Age: 34
End: 2024-04-10
Payer: COMMERCIAL

## 2024-04-10 LAB
ACE CSF-CCNC: <1.5 U/L (ref 0–2.5)
REAGIN AB CSF QL: NON REACTIVE

## 2024-04-10 NOTE — TELEPHONE ENCOUNTER
HUB:  Please transfer patient to Liya or Zaynab at the office to schedule follow up.  Needs to be seen before 1st available.  Thank you

## 2024-04-11 ENCOUNTER — TELEPHONE (OUTPATIENT)
Dept: NEUROLOGY | Facility: CLINIC | Age: 34
End: 2024-04-11
Payer: COMMERCIAL

## 2024-04-11 LAB
BACTERIA SPEC AEROBE CULT: NORMAL
GRAM STN SPEC: NORMAL

## 2024-06-16 DIAGNOSIS — F39 MOOD DISORDER: ICD-10-CM

## 2024-06-16 DIAGNOSIS — F41.9 ANXIETY: ICD-10-CM

## 2024-06-16 DIAGNOSIS — F33.1 MODERATE EPISODE OF RECURRENT MAJOR DEPRESSIVE DISORDER: ICD-10-CM

## 2024-06-19 ENCOUNTER — OFFICE VISIT (OUTPATIENT)
Dept: FAMILY MEDICINE CLINIC | Facility: CLINIC | Age: 34
End: 2024-06-19
Payer: COMMERCIAL

## 2024-06-19 VITALS
SYSTOLIC BLOOD PRESSURE: 134 MMHG | OXYGEN SATURATION: 100 % | HEART RATE: 96 BPM | RESPIRATION RATE: 16 BRPM | WEIGHT: 214 LBS | DIASTOLIC BLOOD PRESSURE: 92 MMHG | TEMPERATURE: 98 F | BODY MASS INDEX: 36.54 KG/M2 | HEIGHT: 64 IN

## 2024-06-19 DIAGNOSIS — F41.9 ANXIETY: ICD-10-CM

## 2024-06-19 DIAGNOSIS — E66.9 OBESITY (BMI 30-39.9): ICD-10-CM

## 2024-06-19 DIAGNOSIS — M25.50 ARTHRALGIA, UNSPECIFIED JOINT: ICD-10-CM

## 2024-06-19 DIAGNOSIS — F33.1 MODERATE EPISODE OF RECURRENT MAJOR DEPRESSIVE DISORDER: Primary | ICD-10-CM

## 2024-06-19 DIAGNOSIS — F39 MOOD DISORDER: ICD-10-CM

## 2024-06-19 PROCEDURE — 99214 OFFICE O/P EST MOD 30 MIN: CPT | Performed by: FAMILY MEDICINE

## 2024-06-19 RX ORDER — LAMOTRIGINE 100 MG/1
100 TABLET ORAL DAILY
Qty: 90 TABLET | Refills: 1 | OUTPATIENT
Start: 2024-06-19

## 2024-06-19 RX ORDER — FLUOXETINE HYDROCHLORIDE 40 MG/1
40 CAPSULE ORAL DAILY
Qty: 90 CAPSULE | Refills: 1 | Status: SHIPPED | OUTPATIENT
Start: 2024-06-19

## 2024-06-19 RX ORDER — LAMOTRIGINE 100 MG/1
100 TABLET ORAL DAILY
Qty: 90 TABLET | Refills: 1 | Status: SHIPPED | OUTPATIENT
Start: 2024-06-19

## 2024-06-19 NOTE — PROGRESS NOTES
Chief Complaint  Med f/u    Subjective          Corrine Eaton presents to [facility]    History of Present Illness  The patient presents for follow-up of multiple medical concerns.    The patient is currently under the care of Dr. Still, neurology, who conducted a spinal tap several months ago to investigate a pseudotumor, which yielded negative results. A follow-up appointment with Dr. Still is scheduled for the end of 07/2024. Following the spinal tap, the patient's headaches significantly improved, albeit persistent, with some days being more severe than others.    The patient's depression are well-managed with lamotrigine, which she reports as being the most effective treatment she has ever been on. Her depressive mood has significantly improved over the past few weeks, although she still experiences it, albeit less severe than in the past. She perceives a difference in her depressive mood compared to her depressive state, while the lamotrigine does not alleviate her anxiety. She describes her anxiety as akin to being on a steroid, characterized by jitteriness, anxiety, and irritability. She expresses a desire to increase her fluoxetine dosage.    The patient is experiencing constant joint pain, particularly in her knees, ankles, wrists, elbows, shoulders, and hips. She also experiences back pain. She has a history of sciatic nerve pain since her pregnancy, which she rates as a 5 on a pain scale of 1 to 10, which is manageable for her. The pain is so severe that it occasionally induces crying. She has a history of bulging disks in her spine at age 15, following a fall from a horse. An MRI of her spine revealed bulging disks, which she believes were contributing to her pain. She avoids pain medication due to previous experience with regular use during her youth. She also has a history of Crohn's disease, which was diagnosed at age 29 and underwent surgery.   Her sister has  a pseudotumor. She has a family history of RA and fibromyalgia.      The following portions of the patient's history were reviewed and updated as appropriate: allergies, current medications, past family history, past medical history, past social history, past surgical history, and problem list.    Objective      Physical Exam  Vitals reviewed.   Cardiovascular:      Rate and Rhythm: Normal rate.      Heart sounds: Normal heart sounds.   Pulmonary:      Effort: Pulmonary effort is normal.      Breath sounds: Normal breath sounds.   Neurological:      Mental Status: She is alert.   Psychiatric:         Mood and Affect: Mood normal.         Behavior: Behavior normal.        Physical Exam      Result Review :       Results               Assessment and Plan    Diagnoses and all orders for this visit:    1. Moderate episode of recurrent major depressive disorder (Primary)  -     Comprehensive Metabolic Panel  -     CBC (No Diff)  -     TSH  -     Vitamin D,25-Hydroxy  -     FLUoxetine (PROzac) 40 MG capsule; Take 1 capsule by mouth Daily.  Dispense: 90 capsule; Refill: 1  -     Ambulatory Referral to Behavioral Health  -     lamoTRIgine (LaMICtal) 100 MG tablet; Take 1 tablet by mouth Daily.  Dispense: 90 tablet; Refill: 1    2. Mood disorder  -     Comprehensive Metabolic Panel  -     CBC (No Diff)  -     TSH  -     Vitamin D,25-Hydroxy  -     FLUoxetine (PROzac) 40 MG capsule; Take 1 capsule by mouth Daily.  Dispense: 90 capsule; Refill: 1  -     Ambulatory Referral to Behavioral Health  -     lamoTRIgine (LaMICtal) 100 MG tablet; Take 1 tablet by mouth Daily.  Dispense: 90 tablet; Refill: 1    3. Anxiety  -     Comprehensive Metabolic Panel  -     CBC (No Diff)  -     TSH  -     Vitamin D,25-Hydroxy  -     FLUoxetine (PROzac) 40 MG capsule; Take 1 capsule by mouth Daily.  Dispense: 90 capsule; Refill: 1  -     Ambulatory Referral to Behavioral Health    4. Obesity (BMI 30-39.9)  -     Comprehensive Metabolic Panel  -      CBC (No Diff)  -     TSH  -     Vitamin D,25-Hydroxy    5. BMI 36.0-36.9,adult  -     Comprehensive Metabolic Panel  -     CBC (No Diff)  -     TSH  -     Vitamin D,25-Hydroxy      Assessment & Plan  1. Mood disorder.  The patient's fluoxetine dosage will be escalated to manage her anxiety. A referral will be made to a mental health professional. Her lamotrigine prescription will be refilled.    2. Health maintenance.  The patient has experienced a weight loss of 20 pounds with effort. Basic labs will be ordered, including a metabolic panel, kidney and liver function tests, and a blood count panel.    3. Joint pain.  A rheumatoid factor test will be conducted to rule out any autoimmune conditions. A consultation with a rheumatologist may be necessary. Should her lab results return normal, a transition to Cymbalta may be considered.        Follow Up   Return in about 6 months (around 12/19/2024) for Recheck.    Patient or patient representative verbalized consent for the use of Ambient Listening during the visit with  Rukhsana Hilliard DO for chart documentation. 6/19/2024  15:09 EDT  Patient was given instructions and counseling regarding her condition or for health maintenance advice. Please see specific information pulled into the AVS if appropriate.

## 2024-06-21 LAB
25(OH)D3+25(OH)D2 SERPL-MCNC: 22.7 NG/ML (ref 30–100)
ALBUMIN SERPL-MCNC: 4.6 G/DL (ref 3.9–4.9)
ALP SERPL-CCNC: 69 IU/L (ref 44–121)
ALT SERPL-CCNC: 13 IU/L (ref 0–32)
ANA SER QL IF: POSITIVE
ANA SPECKLED TITR SER: NORMAL {TITER}
AST SERPL-CCNC: 11 IU/L (ref 0–40)
BILIRUB SERPL-MCNC: 0.5 MG/DL (ref 0–1.2)
BUN SERPL-MCNC: 13 MG/DL (ref 6–20)
BUN/CREAT SERPL: 14 (ref 9–23)
CALCIUM SERPL-MCNC: 10 MG/DL (ref 8.7–10.2)
CENTROMERE B AB SER-ACNC: <0.2 AI (ref 0–0.9)
CHLORIDE SERPL-SCNC: 101 MMOL/L (ref 96–106)
CHROMATIN AB SERPL-ACNC: <0.2 AI (ref 0–0.9)
CK SERPL-CCNC: 82 U/L (ref 32–182)
CO2 SERPL-SCNC: 22 MMOL/L (ref 20–29)
CREAT SERPL-MCNC: 0.93 MG/DL (ref 0.57–1)
CRP SERPL-MCNC: 1 MG/L (ref 0–10)
DSDNA AB SER-ACNC: <1 IU/ML (ref 0–9)
EGFRCR SERPLBLD CKD-EPI 2021: 83 ML/MIN/1.73
ENA JO1 AB SER-ACNC: <0.2 AI (ref 0–0.9)
ENA RNP AB SER-ACNC: <0.2 AI (ref 0–0.9)
ENA SCL70 AB SER-ACNC: <0.2 AI (ref 0–0.9)
ENA SM AB SER-ACNC: <0.2 AI (ref 0–0.9)
ENA SS-A AB SER-ACNC: <0.2 AI (ref 0–0.9)
ENA SS-B AB SER-ACNC: <0.2 AI (ref 0–0.9)
ERYTHROCYTE [DISTWIDTH] IN BLOOD BY AUTOMATED COUNT: 11.9 % (ref 11.7–15.4)
ERYTHROCYTE [SEDIMENTATION RATE] IN BLOOD BY WESTERGREN METHOD: 4 MM/HR (ref 0–32)
GLOBULIN SER CALC-MCNC: 3.1 G/DL (ref 1.5–4.5)
GLUCOSE SERPL-MCNC: 98 MG/DL (ref 70–99)
HCT VFR BLD AUTO: 48.9 % (ref 34–46.6)
HGB BLD-MCNC: 16.3 G/DL (ref 11.1–15.9)
LABORATORY COMMENT REPORT: ABNORMAL
Lab: NORMAL
Lab: NORMAL
MCH RBC QN AUTO: 30.5 PG (ref 26.6–33)
MCHC RBC AUTO-ENTMCNC: 33.3 G/DL (ref 31.5–35.7)
MCV RBC AUTO: 92 FL (ref 79–97)
PLATELET # BLD AUTO: 360 X10E3/UL (ref 150–450)
POTASSIUM SERPL-SCNC: 4.2 MMOL/L (ref 3.5–5.2)
PROT SERPL-MCNC: 7.7 G/DL (ref 6–8.5)
RBC # BLD AUTO: 5.34 X10E6/UL (ref 3.77–5.28)
RHEUMATOID FACT SERPL-ACNC: 11.4 IU/ML
SODIUM SERPL-SCNC: 138 MMOL/L (ref 134–144)
TSH SERPL DL<=0.005 MIU/L-ACNC: 2.62 UIU/ML (ref 0.45–4.5)
WBC # BLD AUTO: 9 X10E3/UL (ref 3.4–10.8)

## 2024-07-02 DIAGNOSIS — R76.8 POSITIVE ANA (ANTINUCLEAR ANTIBODY): ICD-10-CM

## 2024-07-02 DIAGNOSIS — M25.50 ARTHRALGIA, UNSPECIFIED JOINT: Primary | ICD-10-CM

## 2024-07-03 DIAGNOSIS — K50.90 CROHN'S DISEASE WITHOUT COMPLICATION, UNSPECIFIED GASTROINTESTINAL TRACT LOCATION: ICD-10-CM

## 2024-07-03 RX ORDER — ADALIMUMAB 40MG/0.4ML
KIT SUBCUTANEOUS
Qty: 2 EACH | Refills: 11 | Status: SHIPPED | OUTPATIENT
Start: 2024-07-03

## 2024-07-23 ENCOUNTER — OFFICE VISIT (OUTPATIENT)
Dept: NEUROLOGY | Facility: CLINIC | Age: 34
End: 2024-07-23
Payer: COMMERCIAL

## 2024-07-23 VITALS
HEART RATE: 66 BPM | OXYGEN SATURATION: 99 % | DIASTOLIC BLOOD PRESSURE: 78 MMHG | WEIGHT: 223 LBS | HEIGHT: 64 IN | SYSTOLIC BLOOD PRESSURE: 118 MMHG | BODY MASS INDEX: 38.07 KG/M2

## 2024-07-23 DIAGNOSIS — G43.709 CHRONIC MIGRAINE WITHOUT AURA WITHOUT STATUS MIGRAINOSUS, NOT INTRACTABLE: Primary | ICD-10-CM

## 2024-07-23 DIAGNOSIS — F33.1 MODERATE EPISODE OF RECURRENT MAJOR DEPRESSIVE DISORDER: ICD-10-CM

## 2024-07-23 PROBLEM — G43.C0 PERIODIC HEADACHE SYNDROME, NOT INTRACTABLE: Chronic | Status: ACTIVE | Noted: 2024-03-13

## 2024-07-23 PROCEDURE — 99214 OFFICE O/P EST MOD 30 MIN: CPT | Performed by: PSYCHIATRY & NEUROLOGY

## 2024-07-23 RX ORDER — METHYLPREDNISOLONE 4 MG/1
TABLET ORAL
Qty: 1 EACH | Refills: 0 | Status: SHIPPED | OUTPATIENT
Start: 2024-07-23

## 2024-07-23 NOTE — PROGRESS NOTES
"Chief Complaint    Headache    Subjective        Corrine Eaton presents to [facility]  History of Present Illness    33 y.o. female returns in follow up.  Last visit on 3/13/24 ordered MRI Brain/MRV. LP.      MRV, my review of films, 4/2/24 absent or occluded L transverse sinus.     LP opening pressure 12 cm H20, CSF benign     Improvement in HA following LP.      Recurred.  Awakens daily with dull throbbing.  Located in vertex, right temple and face.  Teeth and tongue ache and prickly.     HA frequency is daily,  Constant for 4 - 5 months.  Located behind OD.  Shooting pains with eye movement,  Right side of face N/T.  Moderate to severe intensity.      2020 HA worsened after Covid and giving birth.       Pulsatile tinnitus 1 - 2 times a week.     Reviewed medical records:     HA started in teens.  Worse with period.  Located on right side of head.  Sharp burning pain.       Preventatives:  TPM, VPA, GBP,   Abortive:  Imitrex, Nurtec        Objective   Vital Signs:  /78   Pulse 66   Ht 162.6 cm (64.02\")   Wt 101 kg (223 lb)   SpO2 99%   BMI 38.26 kg/m²   Estimated body mass index is 38.26 kg/m² as calculated from the following:    Height as of this encounter: 162.6 cm (64.02\").    Weight as of this encounter: 101 kg (223 lb).       Neurologic Exam     Mental Status   Oriented to person, place, and time.   Speech: speech is normal   Level of consciousness: alert  Knowledge: good and consistent with education.   Normal comprehension.     Cranial Nerves   Cranial nerves II through XII intact.     CN II   Visual fields full to confrontation.   Visual acuity: normal  Right visual field deficit: none  Left visual field deficit: none     CN III, IV, VI   Pupils are equal, round, and reactive to light.  Extraocular motions are normal.   Nystagmus: none   Diplopia: none  Ophthalmoparesis: none  Upgaze: normal  Downgaze: normal  Conjugate gaze: present    CN V   Facial sensation " intact.   Right corneal reflex: normal  Left corneal reflex: normal    CN VII   Right facial weakness: none  Left facial weakness: none    CN VIII   Hearing: intact    CN IX, X   Palate: symmetric  Right gag reflex: normal  Left gag reflex: normal    CN XI   Right sternocleidomastoid strength: normal  Left sternocleidomastoid strength: normal    CN XII   Tongue: not atrophic  Fasciculations: absent  Tongue deviation: none    Motor Exam   Muscle bulk: normal  Overall muscle tone: normal    Strength   Strength 5/5 throughout.     Sensory Exam   Light touch normal.     Gait, Coordination, and Reflexes     Gait  Gait: normal    Tremor   Resting tremor: absent  Intention tremor: absent  Action tremor: absent    Reflexes   Reflexes 2+ except as noted.          Physical Exam  Eyes:      Extraocular Movements: EOM normal.      Pupils: Pupils are equal, round, and reactive to light.   Neurological:      Mental Status: She is oriented to person, place, and time.      Cranial Nerves: Cranial nerves 2-12 are intact.      Motor: Motor strength is normal.     Gait: Gait is intact.   Psychiatric:         Speech: Speech normal.        Result Review :    The following data was reviewed by: Yvan Still MD on 07/23/2024:  Common labs          4/8/2024    10:20 6/19/2024    15:00   Common Labs   Glucose  98    BUN  13    Creatinine  0.93    Sodium  138    Potassium  4.2    Chloride  101    Calcium  10.0    Total Protein  7.7    Albumin 3.9  4.6    Total Bilirubin  0.5    Alkaline Phosphatase  69    AST (SGOT)  11    ALT (SGPT)  13    WBC  9.0    Hemoglobin  16.3    Hematocrit  48.9    Platelets  360      Data reviewed : Radiologic studies MRV and LP              Assessment and Plan     Diagnoses and all orders for this visit:    1. Chronic migraine without aura without status migrainosus, not intractable (Primary)  Assessment & Plan:  Medrol dose pack     New Qulipta and Ubrevly               2. Moderate episode of recurrent  major depressive disorder    Other orders  -     methylPREDNISolone (MEDROL) 4 MG dose pack; Take as directed on package instructions.  Dispense: 1 each; Refill: 0             Follow Up     No follow-ups on file.  Patient was given instructions and counseling regarding her condition or for health maintenance advice. Please see specific information pulled into the AVS if appropriate.

## 2024-07-24 ENCOUNTER — SPECIALTY PHARMACY (OUTPATIENT)
Dept: ONCOLOGY | Facility: HOSPITAL | Age: 34
End: 2024-07-24
Payer: COMMERCIAL

## 2024-07-24 RX ORDER — ATOGEPANT 60 MG/1
60 TABLET ORAL DAILY
Qty: 30 TABLET | Refills: 11 | Status: SHIPPED | OUTPATIENT
Start: 2024-07-24

## 2024-07-25 ENCOUNTER — OFFICE VISIT (OUTPATIENT)
Dept: BEHAVIORAL HEALTH | Facility: CLINIC | Age: 34
End: 2024-07-25
Payer: COMMERCIAL

## 2024-07-25 VITALS
DIASTOLIC BLOOD PRESSURE: 74 MMHG | SYSTOLIC BLOOD PRESSURE: 118 MMHG | BODY MASS INDEX: 38.07 KG/M2 | HEIGHT: 64 IN | WEIGHT: 223 LBS

## 2024-07-25 DIAGNOSIS — F31.60 BIPOLAR AFFECTIVE DISORDER, MIXED: Primary | ICD-10-CM

## 2024-07-25 DIAGNOSIS — F41.9 ANXIETY: ICD-10-CM

## 2024-07-25 RX ORDER — FLUOXETINE HYDROCHLORIDE 20 MG/1
20 CAPSULE ORAL DAILY
Qty: 7 CAPSULE | Refills: 0 | Status: SHIPPED | OUTPATIENT
Start: 2024-07-25

## 2024-07-25 RX ORDER — ZIPRASIDONE HYDROCHLORIDE 40 MG/1
40 CAPSULE ORAL 2 TIMES DAILY WITH MEALS
Qty: 60 CAPSULE | Refills: 1 | Status: SHIPPED | OUTPATIENT
Start: 2024-07-25

## 2024-07-25 RX ORDER — LAMOTRIGINE 200 MG/1
200 TABLET ORAL DAILY
Qty: 30 TABLET | Refills: 1 | Status: SHIPPED | OUTPATIENT
Start: 2024-07-25
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
## 2024-07-29 ENCOUNTER — TELEPHONE (OUTPATIENT)
Dept: NEUROLOGY | Facility: CLINIC | Age: 34
End: 2024-07-29
Payer: COMMERCIAL

## 2024-07-29 NOTE — TELEPHONE ENCOUNTER
Provider: DR SOFIA    Caller: RAKEL    Relationship to Patient: SELF    Pharmacy: [facility]    Phone Number: [phone]    Reason for Call: PT STILL HAS MIGRAINE    When was the patient last seen: 7-23-24    When did it start: DECEMBER OF LAST YEAR - PT HAS ONLY HAVE ABOUT A MONTH OR TWO WHERE SHE HASN'T HAD A HEADACHE.    Where is it located: RIGHT SIDE OF PT'S HEAD, RUNNING DOWN THE CENTER OF HER HEAD DOWN TO HER JAW.    Characteristics of symptom/severity:   IN THE LAST WEEK THE PT BLACKED OUT TWICE DUE TO THE PAIN. PAIN LEVEL DAILY AT AN 8 AND GETS WORSE.    Timing- Is it constant or intermittent:     What makes it worse: LIGHT AND SOUND    What makes it better: NOTHING    What therapies/medications have you tried:   STEROID - LAST TAKEN 7-28-24 AND PT IS ON QULIPTA NOW FOR 6 DAYS. PT HASN'T STARTED THE UBRELVY YET AS SHE WAS TOLD NOT TO TAKE IT UNTIL THE QULIPTA WAS IN HER SYSTEM.    PT ISN'T ABLE TO CARE FOR HER CHILDREN. SHE CAN'T COOK, SPEAK OR MOVE WHEN SHE HAS MIGRAINES.    PLEASE CALL PT BACK TO DISCUSS OTHER RX OPTIONS.

## 2024-07-30 NOTE — TELEPHONE ENCOUNTER
Patient states that she understands that she hasn't given the medication time to work but she knows that by 8p tonight she will be unable to walk and she has blacked out twice in the last week. She is unable to deal with the migraine pain and is requesting something different to help with that.  She states that she had a migraine everyday and it is an 8 out of a 10 on the pain scale at least one time a week.  She states that by tonight she will need to be carried and will be unable to speak.

## 2024-08-01 ENCOUNTER — TELEMEDICINE (OUTPATIENT)
Dept: FAMILY MEDICINE CLINIC | Facility: CLINIC | Age: 34
End: 2024-08-01
Payer: COMMERCIAL

## 2024-08-01 VITALS — HEIGHT: 64 IN | BODY MASS INDEX: 37.56 KG/M2 | WEIGHT: 220 LBS

## 2024-08-01 DIAGNOSIS — G43.709 CHRONIC MIGRAINE WITHOUT AURA WITHOUT STATUS MIGRAINOSUS, NOT INTRACTABLE: Primary | ICD-10-CM

## 2024-08-01 PROCEDURE — 99213 OFFICE O/P EST LOW 20 MIN: CPT | Performed by: FAMILY MEDICINE

## 2024-08-01 NOTE — PROGRESS NOTES
Chief Complaint  Migraine (Saw neurologist twice. She did not care for provider as he was not communicative with her as far as diagnosis. She collapsed yesterday in her kitchen when she blacked out with migraine. She will have to have new referral to see an different neurologist.)  You have chosen to receive care through a telehealth visit.  Do you consent to use a video/audio connection for your medical care today? Yes     Patient location: Home  Provider location: [facility]  Technology used: Diego Eaton presents to [facility]  History of Present Illness  She is seeing neurology, Dr. Still, for treatment of migraines. In April 2024, she completed MRI angiogram venogram head and lumbar puncture.  Migraines are worsening, starting a month ago, occurring daily.  States that she blacked out twice in the last week due to pain from migraine and last night, she collapsed in her kitchen due to migraines. She did reach out to neurology    She was started on Qulipta a week ago, along with Ubrelvy. She is aware that it will take Qulipta longer time to take effect. She reports that Medrol pack was ineffective.   She returns to work in <2 weeks and worried that she will not be able to work if migraines are at this level.   Sleep is the only thing that improves her migraine.   She has also tried Esgic, Imitrex, and gabapentin, neither relieved her migraine. Imitrex caused a reaction.  She is wanting to seek a second opinion with neurology, requesting a referral.     The following portions of the patient's history were reviewed and updated as appropriate: allergies, current medications, past family history, past medical history, past social history, past surgical history, and problem list.    Objective      Physical Exam  HENT:      Head: Normocephalic.   Pulmonary:      Effort: Pulmonary effort is normal. No respiratory  distress.   Neurological:      Mental Status: She is alert.        Result Review :                Assessment and Plan    Diagnoses and all orders for this visit:    1. Chronic migraine without aura without status migrainosus, not intractable (Primary)  -     Ambulatory Referral to Neurology    Referral placed to neurology for second opinion per patient request.  She will keep upcoming appointment with her current neurologist, which is scheduled tomorrow.    I spent 15 minutes caring for Corrine on this date of service. This time includes time spent by me in the following activities:obtaining and/or reviewing a separately obtained history, performing a medically appropriate examination and/or evaluation , ordering medications, tests, or procedures, referring and communicating with other health care professionals , and documenting information in the medical record  Follow Up   No follow-ups on file.  Patient was given instructions and counseling regarding her condition or for health maintenance advice. Please see specific information pulled into the AVS if appropriate.

## 2024-08-02 ENCOUNTER — TELEPHONE (OUTPATIENT)
Dept: BEHAVIORAL HEALTH | Facility: CLINIC | Age: 34
End: 2024-08-02
Payer: COMMERCIAL

## 2024-08-02 ENCOUNTER — TELEMEDICINE (OUTPATIENT)
Dept: NEUROLOGY | Facility: CLINIC | Age: 34
End: 2024-08-02
Payer: COMMERCIAL

## 2024-08-02 DIAGNOSIS — G43.709 CHRONIC MIGRAINE WITHOUT AURA WITHOUT STATUS MIGRAINOSUS, NOT INTRACTABLE: Primary | ICD-10-CM

## 2024-08-02 PROCEDURE — 99214 OFFICE O/P EST MOD 30 MIN: CPT | Performed by: PSYCHIATRY & NEUROLOGY

## 2024-08-02 RX ORDER — OXCARBAZEPINE 300 MG/1
300 TABLET, FILM COATED ORAL 2 TIMES DAILY
Qty: 60 TABLET | Refills: 6 | Status: SHIPPED | OUTPATIENT
Start: 2024-08-02 | End: 2025-08-02

## 2024-08-02 RX ORDER — CYPROHEPTADINE HYDROCHLORIDE 4 MG/1
4-8 TABLET ORAL 4 TIMES DAILY PRN
Qty: 120 TABLET | Refills: 3 | Status: SHIPPED | OUTPATIENT
Start: 2024-08-02

## 2024-08-02 NOTE — TELEPHONE ENCOUNTER
Put on oxcarbazeprine and cyproheptadine for migraines by another provider and is concerned about interactions. Are these safe to take with her other meds? Please advise

## 2024-08-02 NOTE — ASSESSMENT & PLAN NOTE
Greater than 15 HA a month, moderate to severe intensity, Present for over 6 months, lasting over 4 - 6 months.     Start BTX     Add OXC and Periactin

## 2024-08-02 NOTE — PROGRESS NOTES
"Chief Complaint    Migraines    Subjective           Corrine Eaton presents to [facility]    History of Present Illness    33 y.o. female returns in follow up.  Last visit on 7/23/24 rx Quliptia, Ubrevly.     MRV, my review of films, 4/2/24 absent or occluded L transverse sinus.      LP opening pressure 12 cm H20, CSF benign      HA are worsening in severity.  Pt states she passed out several times.      Steroids, Ubrevly did not help.     Sleep improves HA.      Awakens daily with dull throbbing.  Located in vertex, right temple and face.  Teeth and tongue ache and prickly.      HA frequency is daily,  Constant for 4 - 5 months.  Located behind OD.  Shooting pains with eye movement,  Right side of face N/T.  Moderate to severe intensity.      2020 HA worsened after Covid and giving birth.       Pulsatile tinnitus 1 - 2 times a week.      Reviewed medical records:     HA started in teens.  Worse with period.  Located on right side of head.  Sharp burning pain.       Preventatives:  TPM, VPA, GBP  Abortive:  Imitrex, Nurtec, Ubrevly         Objective   Vital Signs:   There were no vitals taken for this visit.    Estimated body mass index is 37.76 kg/m² as calculated from the following:    Height as of 8/1/24: 162.6 cm (64\").    Weight as of 8/1/24: 99.8 kg (220 lb).     Physical Exam   Constitutional: She is oriented to person, place, and time.   Eyes: EOM are normal.   Neurological: She is oriented to person, place, and time.       Neurologic Exam     Mental Status   Oriented to person, place, and time.   Attention: normal. Concentration: normal.   Level of consciousness: alert  Knowledge: good.     Cranial Nerves     CN III, IV, VI   Extraocular motions are normal.     CN VII   Facial expression full, symmetric.     CN VIII   CN VIII normal.      Result Review :     The following data was reviewed by: Yvan Still MD on 08/02/2024:  Common labs          4/8/2024    10:20 " 6/19/2024    15:00   Common Labs   Glucose  98    BUN  13    Creatinine  0.93    Sodium  138    Potassium  4.2    Chloride  101    Calcium  10.0    Total Protein  7.7    Albumin 3.9  4.6    Total Bilirubin  0.5    Alkaline Phosphatase  69    AST (SGOT)  11    ALT (SGPT)  13    WBC  9.0    Hemoglobin  16.3    Hematocrit  48.9    Platelets  360                Assessment and Plan      Diagnoses and all orders for this visit:    1. Chronic migraine without aura without status migrainosus, not intractable (Primary)  Assessment & Plan:  Greater than 15 HA a month, moderate to severe intensity, Present for over 6 months, lasting over 4 - 6 months.     Start BTX     Add OXC               Other orders  -     OXcarbazepine (TRILEPTAL) 300 MG tablet; Take 1 tablet by mouth 2 (Two) Times a Day.  Dispense: 60 tablet; Refill: 6  -     cyproheptadine (PERIACTIN) 4 MG tablet; Take 1-2 tablets by mouth 4 (Four) Times a Day As Needed (HA).  Dispense: 120 tablet; Refill: 3        Follow Up     No follow-ups on file.  Patient was given instructions and counseling regarding her condition or for health maintenance advice. Please see specific information pulled into the AVS if appropriate.     Mode of Visit: Video  Location of patient: home  Location of provider: [facility]  You have chosen to receive care through a telehealth visit.  Does the patient consent to use a video/audio connection for your medical care today? Yes  The visit included audio and video interaction. No technical issues occurred during this visit.

## 2024-08-12 ENCOUNTER — SPECIALTY PHARMACY (OUTPATIENT)
Dept: ONCOLOGY | Facility: HOSPITAL | Age: 34
End: 2024-08-12
Payer: COMMERCIAL

## 2024-08-12 NOTE — PROGRESS NOTES
Specialty Pharmacy Refill Coordination Note     Corrine is a 33 y.o. female contacted today regarding refills of  *** specialty medication(s).    Reviewed and verified with patient:       Specialty medication(s) and dose(s) confirmed: {YES NO YES DEFAULT:38266}              Medication Adherence    Adherence tools used: directed education  Support network for adherence: family member          Follow-up: *** {DAYS, WEEKS, MONTHS, YEARS:13089}     Yulissa Templeton, Pharmacy Technician  Specialty Pharmacy Technician

## 2024-08-12 NOTE — PROGRESS NOTES
Specialty Pharmacy Patient Management Program  Neurology Initial Assessment     Corrine Eaton is a 33 y.o. female with chronic migraines seen by a Neurology provider and enrolled in the Neurology Patient Management program offered by [facility].  An initial outreach was conducted, including assessment of therapy appropriateness and specialty medication education for botox 200 units in office, qulipta 60 mg po qday, ubrelvy 100 mg prn daily.. The patient was introduced to services offered by [facility], including: regular assessments, refill coordination, curbside pick-up or mail order delivery options, prior authorization maintenance, and financial assistance programs as applicable. The patient was also provided with contact information for the pharmacy team.     Insurance Coverage & Financial Support  [facility] with PA and copay cards    Relevant Past Medical History and Comorbidities  Relevant medical history and concomitant health conditions were discussed with the patient. The patient's chart has been reviewed for relevant past medical history and comorbid health conditions and updated as necessary.   Past Medical History:   Diagnosis Date    ADHD (attention deficit hyperactivity disorder)     Anxiety     Cholelithiasis 11/2015    Colon polyp     Depression     GERD (gastroesophageal reflux disease)     Headache     Inflammatory bowel disease     Irritable bowel syndrome 09/2010    Low back pain     Peptic ulceration 9/2021     Social History     Socioeconomic History    Marital status:    Tobacco Use    Smoking status: Never     Passive exposure: Never    Smokeless tobacco: Never   Vaping Use    Vaping status: Never Used   Substance and Sexual Activity    Alcohol use: Yes     Alcohol/week: 5.0 standard drinks of alcohol     Types: 5 Glasses of wine per week    Drug use: Never    Sexual activity: Yes     Partners: Male     Birth control/protection:  Surgical     Problem list reviewed by Jerome Garrido, PharmD on 8/12/2024 at  4:07 PM    Allergies  Known allergies and reactions were discussed with the patient. The patient's chart has been reviewed for  allergy information and updated as necessary.   Allergies   Allergen Reactions    Imitrex [Sumatriptan] Shortness Of Breath and Headache     Made migraine worse     Allergies reviewed by Jerome Garrido, PharmD on 8/12/2024 at  4:06 PM    Relevant Laboratory Values  Common labs          4/8/2024    10:20 6/19/2024    15:00   Common Labs   Glucose  98    BUN  13    Creatinine  0.93    Sodium  138    Potassium  4.2    Chloride  101    Calcium  10.0    Total Protein  7.7    Albumin 3.9  4.6    Total Bilirubin  0.5    Alkaline Phosphatase  69    AST (SGOT)  11    ALT (SGPT)  13    WBC  9.0    Hemoglobin  16.3    Hematocrit  48.9    Platelets  360        Lab Assessment   The above labs have been reviewed. No dose adjustments are needed for the specialty medication(s) based on the labs.     Current Medication List  This medication list has been reviewed with the patient and evaluated for any interactions or necessary modifications/recommendations, and updated to include all prescription medications, OTC medications, and supplements the patient is currently taking.  This list reflects what is contained in the patient's profile, which has also been marked as reviewed to communicate to other providers it is the most up to date version of the patient's current medication therapy.     Current Outpatient Medications:     Atogepant (Qulipta) 60 MG tablet, Take 1 tablet by mouth Daily., Disp: 30 tablet, Rfl: 11    cyproheptadine (PERIACTIN) 4 MG tablet, Take 1-2 tablets by mouth 4 (Four) Times a Day As Needed (HA)., Disp: 120 tablet, Rfl: 3    dicyclomine (Bentyl) 10 MG capsule, Take 1 capsule by mouth 3 (Three) Times a Day As Needed (abdominal pain)., Disp: 180 capsule, Rfl: 3    FLUoxetine (PROzac) 20 MG capsule,  Take 1 capsule by mouth Daily., Disp: 7 capsule, Rfl: 0    Humira, 2 Pen, 40 MG/0.4ML Pen-injector Kit, INJECT 1 PEN UNDER THE SKIN EVERY 14 DAYS., Disp: 2 each, Rfl: 11    lamoTRIgine (LaMICtal) 200 MG tablet, Take 1 tablet by mouth Daily., Disp: 30 tablet, Rfl: 1    OnabotulinumtoxinA 200 units reconstituted solution, FOR . PHYSICIAN TO INJECT 155 UNITS INTRAMUSCULARLY INTO HEAD, NECK AND SHOULDERS EVERY 12 WEEKS Per FDA PROTOCOL, Disp: 1 each, Rfl: 3    OXcarbazepine (TRILEPTAL) 300 MG tablet, Take 1 tablet by mouth 2 (Two) Times a Day., Disp: 60 tablet, Rfl: 6    pantoprazole (PROTONIX) 40 MG EC tablet, TAKE 1 TABLET BY MOUTH EVERY DAY, Disp: 90 tablet, Rfl: 3    ubrogepant (Ubrelvy) 100 MG tablet, Take at onset of headache - if symptoms persist or return, may repeat dose in 2 hours. Maximum: 200 mg per 24 hours, Disp: 16 tablet, Rfl: 11    ziprasidone (GEODON) 40 MG capsule, Take 1 capsule by mouth 2 (Two) Times a Day With Meals., Disp: 60 capsule, Rfl: 1    Medicines reviewed by Jerome Garrido, PharmD on 8/12/2024 at  4:06 PM    Drug Interactions  No relevant drug drug interactions with specialty medication. Duplication with the qulipta and ubrelvy        Initial Education Provided for Specialty Medication  The patient has been provided with the following education and any applicable administration techniques (i.e. self-injection) have been demonstrated for the therapies indicated. All questions and concerns have been addressed prior to the patient receiving the medication, and the patient has verbalized understanding of the education and any materials provided.  Additional patient education shall be provided and documented upon request by the patient, provider or payer.      e     Adherence and Self-Administration  Adherence related to the patient's specialty therapy was discussed with the patient. The Adherence segment of this outreach has been reviewed and updated.   Is there a  concern with patient's ability to self administer the medication correctly and without issue?: No  Were any potential barriers to adherence identified during the initial assessment or patient education?: No  Are there any concerns regarding the patient's understanding of the importance of medication adherence?: No  Methods for Supporting Patient Adherence and/or Self-Administration: direct education,     Goals of Therapy  Goals related to the patient's specialty therapy were discussed with the patient. The Patient Goals segment of this outreach has been reviewed and updated.   Goals Addressed Today        Specialty Pharmacy General Goal      To reduce the severity, frequency and duration of migraine headaches.               Reassessment Plan & Follow-Up  Medication Therapy Changes: begin qulipta 60 mg po qday and ubrelvy 100 mg po qday prn, with in office injections of botox 200 units.  Related Plans, Therapy Recommendations, or Therapy Problems to Be Addressed: n/a  Pharmacist to perform regular reassessments no more than (6) months from the previous assessment.  Care Coordinator to set up future refill outreaches, coordinate prescription delivery, and escalate clinical questions to pharmacist.   Welcome information and patient satisfaction survey to be sent by specialty pharmacy team with patient's initial fill.    Attestation  Therapeutic appropriateness: Appropriate   I attest the patient was actively involved in and has agreed to the above plan of care. If the prescribed therapy is at any point deemed not appropriate based on the current or future assessments, a consultation will be initiated with the patient's specialty care provider to determine the best course of action. The revised plan of therapy will be documented along with any additional patient education provided. Discussed aforementioned material with patient via telemedicine.    Jerome Garrido, PharmD, [facility] Specialty Pharmacist,  Neurology  8/12/2024  16:12 EDT

## 2024-08-13 ENCOUNTER — PROCEDURE VISIT (OUTPATIENT)
Dept: NEUROLOGY | Facility: CLINIC | Age: 34
End: 2024-08-13
Payer: COMMERCIAL

## 2024-08-13 DIAGNOSIS — G43.709 CHRONIC MIGRAINE WITHOUT AURA WITHOUT STATUS MIGRAINOSUS, NOT INTRACTABLE: Primary | Chronic | ICD-10-CM

## 2024-08-13 RX ORDER — OXCARBAZEPINE 300 MG/1
900 TABLET, FILM COATED ORAL 2 TIMES DAILY
Qty: 180 TABLET | Refills: 11 | Status: SHIPPED | OUTPATIENT
Start: 2024-08-13 | End: 2025-08-13

## 2024-08-13 NOTE — PROGRESS NOTES
Botulinum Toxin Injection Procedure    Chief Complaint   Patient presents with    Botulinum Toxin Injection     Patient supplied - do not bill     Pre-operative diagnosis: headache    Post-operative diagnosis: Same    Procedure: Chemical neurolysis    After risks and benefits were explained including bleeding, infection, worsening of pain, damage to the areas being injected, weakness of muscles, loss of muscle control, dysphagia if injecting the head or neck, facial droop if injecting the facial area, painful injection, allergic or other reaction to the medications being injected, and the failure of the procedure to help the problem, a signed consent was obtained.      The patient was placed in a comfortable area and the sites to be treated were identified. A time out was called and performed. The area to be treated was prepped three times with alcohol and the alcohol allowed to dry. Next, a 27 gauge needle was used to inject the medication in the area to be treated.    Area(s) injected: frontalis muscle, semispinalis capitis muscle and temporallis muscle    Medications used: botulinum toxin 200 mu, 2 mL of saline used to dilute the botulinum toxin.      The patient did tolerate the procedure well. There were no complications.       Physical Examination    Current Treatment (Units)   Splenius Capitis 25 units on the right and 25 units on the left.   Temporalis 25 units on the right and 25 units on the left.   Frontalis 27 units on the right and 28 units on the left.   EMG Guidance none .   Complications: none .   The total amount injected in units is 155 .   The total amount wasted in units is 45 .   The total amount submitted in units is 200 .

## 2024-08-29 ENCOUNTER — OFFICE VISIT (OUTPATIENT)
Dept: BEHAVIORAL HEALTH | Facility: CLINIC | Age: 34
End: 2024-08-29
Payer: COMMERCIAL

## 2024-08-29 DIAGNOSIS — G89.29 CHRONIC IDIOPATHIC FACIAL PAIN: ICD-10-CM

## 2024-08-29 DIAGNOSIS — F31.77 BIPOLAR DISORDER, IN PARTIAL REMISSION, MOST RECENT EPISODE MIXED: Primary | ICD-10-CM

## 2024-08-29 DIAGNOSIS — R51.9 CHRONIC IDIOPATHIC FACIAL PAIN: ICD-10-CM

## 2024-08-29 RX ORDER — CLONAZEPAM 0.5 MG/1
0.5 TABLET ORAL 2 TIMES DAILY
Qty: 20 TABLET | Refills: 0 | Status: SHIPPED | OUTPATIENT
Start: 2024-08-29

## 2024-08-29 NOTE — PROGRESS NOTES
"     Office  Follow Up Visit      Patient Name: Corrine Eaton  : 1990   MRN: [id number]     Referring Provider: Rukhsana Hilliadr DO    Chief Complaint: Medication follow-up    ICD-10-CM ICD-9-CM   1. Bipolar disorder, in partial remission, most recent episode mixed  F31.77 296.65   2. Chronic idiopathic facial pain  R51.9 784.0    G89.29 338.29        History of Present Illness:   Corrine Eaton is a 33 y.o. female who is here today for follow up related to bipolar disorder.  Current medication regimen includes lamotrigine 200 mg and ziprasidone 40 mg twice daily.    Patient reports that she has seen a significant change in her mood since last month.  Patient reports since increasing ziprasidone to twice daily dosing and increase lamotrigine to 200 mg she has seen a vast improvement in mood stability and states \"my mood has never felt better, I feel like this is the best has been my entire life.\"  Patient also reports she was able to wean off Prozac and experienced no withdrawal effects.    Patient reports that her continued biggest concern at this time is related to her severe right sided facial pain.  Patient reports that she has been seen neurology and has been told it is \"only migraines.\"  She reports that she has been on every medication possible to address this and nothing seems to work.  Patient also reports she does not want to be on any type of pain medication.  She is currently taking oxcarbazepine 900 mg twice a day and states \"it just barely seems to help.\"    She reports that her mother has trigeminal neuralgia and that her symptoms coincide with hers.  She reports that she brought this up with her neurology who \"just blew it off.\"  Patient reports that the numbness and tingling is \"severe\" and is very difficult at times to chew and eat.  She reports that her neurologist attempted to inject Botox into her face which led to severe pain.  Patient reports she has an appointment with a new " neurologist for a second opinion.      Subjective      Review of Systems:   Review of Systems   Constitutional: Negative.    Respiratory: Negative.     Cardiovascular: Negative.    Gastrointestinal: Negative.    Genitourinary: Negative.    Musculoskeletal: Negative.    Neurological:         Right sided facial pain   Psychiatric/Behavioral: Negative.         Patient History:   The following portions of the patient's history were reviewed and updated as appropriate: allergies, current medications, past family history, past medical history, past social history, past surgical history and problem list.     Social:  No change interval history.    Medications:     Current Outpatient Medications:     lamoTRIgine (LaMICtal) 200 MG tablet, Take 1 tablet by mouth Daily., Disp: 30 tablet, Rfl: 1    ziprasidone (GEODON) 40 MG capsule, Take 1 capsule by mouth 2 (Two) Times a Day With Meals., Disp: 60 capsule, Rfl: 1    clonazePAM (KlonoPIN) 0.5 MG tablet, Take 1 tablet by mouth 2 (Two) Times a Day., Disp: 20 tablet, Rfl: 0    dicyclomine (Bentyl) 10 MG capsule, Take 1 capsule by mouth 3 (Three) Times a Day As Needed (abdominal pain)., Disp: 180 capsule, Rfl: 3    Humira, 2 Pen, 40 MG/0.4ML Pen-injector Kit, INJECT 1 PEN UNDER THE SKIN EVERY 14 DAYS., Disp: 2 each, Rfl: 11    OnabotulinumtoxinA 200 units reconstituted solution, FOR . PHYSICIAN TO INJECT 155 UNITS INTRAMUSCULARLY INTO HEAD, NECK AND SHOULDERS EVERY 12 WEEKS Per FDA PROTOCOL, Disp: 1 each, Rfl: 3    OXcarbazepine (TRILEPTAL) 300 MG tablet, Take 3 tablets by mouth 2 (Two) Times a Day., Disp: 180 tablet, Rfl: 11    pantoprazole (PROTONIX) 40 MG EC tablet, TAKE 1 TABLET BY MOUTH EVERY DAY, Disp: 90 tablet, Rfl: 3    ubrogepant (Ubrelvy) 100 MG tablet, Take 1 tablet by mouth once daily as needed at onset of headache - if symptoms persist or return, may repeat dose once in 2 hours. Max: 200mg per 24 hours, Disp: 16 tablet, Rfl: 11    Objective  "    Physical Exam:  Vital Signs:   Vitals:    08/30/24 0720   BP: 118/76   Weight: 99.8 kg (220 lb)   Height: 162.6 cm (64\")     Body mass index is 37.76 kg/m².     Mental Status Exam:   Hygiene:   good  Cooperation:  Cooperative  Eye Contact:  Good  Psychomotor Behavior:  Appropriate  Affect:  Appropriate  Mood: normal  Speech:  Pressured  Thought Process:  Goal directed  Thought Content:  Mood congruent  Suicidal:  None  Homicidal:  None  Hallucinations:  None  Delusion:  None  Memory:  Intact  Orientation:  Grossly intact  Reliability:  good  Insight:  Good  Judgement:  Good  Impulse Control:  Fair  Physical/Medical Issues: Yes, multiple medical comorbidities, see chart    Assessment / Plan      Visit Diagnosis/Orders Placed This Visit:  Diagnoses and all orders for this visit:    1. Bipolar disorder, in partial remission, most recent episode mixed (Primary)  -     lamoTRIgine (LaMICtal) 200 MG tablet; Take 1 tablet by mouth Daily.  Dispense: 30 tablet; Refill: 1  -     ziprasidone (GEODON) 40 MG capsule; Take 1 capsule by mouth 2 (Two) Times a Day With Meals.  Dispense: 60 capsule; Refill: 1    2. Chronic idiopathic facial pain  -     clonazePAM (KlonoPIN) 0.5 MG tablet; Take 1 tablet by mouth 2 (Two) Times a Day.  Dispense: 20 tablet; Refill: 0         Differential:   Neuropathic pain, trigeminal neuralgia    Plan:   Continue ziprasidone 40 mg twice daily.  Continue lamotrigine 200 mg daily.  Clonazepam 0.5 mg twice daily.  Follow-up with neurology.  We will send patient a message via my chart so she can be updated if there are any changes related to her facial pain.    Continue supportive psychotherapy efforts and medications as indicated. Treatment and medication options discussed during today's visit. Patient ackowledged and verbally consented to continue with current treatment plan and was educated on the importance of compliance with treatment and follow-up appointments. Patient seems reasonably able to " adhere to treatment plan.      Medication Considerations:  Discussed medication options and treatment plan of prescribed medication(s) as well as the risks, benefits, and potential side effects. Patient is agreeable to call the office with any worsening of symptoms or onset of side effects. Patient is agreeable to call 911 or go to the nearest ER should he/she begin having SI/HI.    Quality Measures:   Never smoker    I advised Corrine Eaton of the risks of tobacco use.     Follow Up:   Return in about 1 month (around 9/29/2024) for Recheck.      SCHUYLER Hawkins, PMHNP-BC

## 2024-08-30 VITALS
WEIGHT: 220 LBS | DIASTOLIC BLOOD PRESSURE: 76 MMHG | BODY MASS INDEX: 37.56 KG/M2 | SYSTOLIC BLOOD PRESSURE: 118 MMHG | HEIGHT: 64 IN

## 2024-08-30 RX ORDER — ZIPRASIDONE HYDROCHLORIDE 40 MG/1
40 CAPSULE ORAL 2 TIMES DAILY WITH MEALS
Qty: 60 CAPSULE | Refills: 1 | Status: SHIPPED | OUTPATIENT
Start: 2024-08-30

## 2024-08-30 RX ORDER — LAMOTRIGINE 200 MG/1
200 TABLET ORAL DAILY
Qty: 30 TABLET | Refills: 1 | Status: SHIPPED | OUTPATIENT
Start: 2024-08-30

## 2024-09-24 DIAGNOSIS — F41.9 ANXIETY: ICD-10-CM

## 2024-09-24 DIAGNOSIS — F39 MOOD DISORDER: ICD-10-CM

## 2024-09-24 DIAGNOSIS — F33.1 MODERATE EPISODE OF RECURRENT MAJOR DEPRESSIVE DISORDER: ICD-10-CM

## 2024-09-24 RX ORDER — CYPROHEPTADINE HYDROCHLORIDE 4 MG/1
4-8 TABLET ORAL 4 TIMES DAILY PRN
Qty: 285 TABLET | Refills: 1 | Status: SHIPPED | OUTPATIENT
Start: 2024-09-24

## 2024-10-02 ENCOUNTER — TELEPHONE (OUTPATIENT)
Dept: FAMILY MEDICINE CLINIC | Facility: CLINIC | Age: 34
End: 2024-10-02
Payer: COMMERCIAL

## 2024-10-02 NOTE — TELEPHONE ENCOUNTER
Corrine called in due to she wasn't able to respond to the [employer] message. She wanted to let you know that the clonazePAM is helping with the pain at night. She is now able to sleep at night.

## 2024-10-10 ENCOUNTER — OFFICE VISIT (OUTPATIENT)
Age: 34
End: 2024-10-10
Payer: COMMERCIAL

## 2024-10-10 ENCOUNTER — LAB (OUTPATIENT)
Facility: HOSPITAL | Age: 34
End: 2024-10-10
Payer: COMMERCIAL

## 2024-10-10 VITALS
BODY MASS INDEX: 36.88 KG/M2 | WEIGHT: 216 LBS | TEMPERATURE: 98 F | SYSTOLIC BLOOD PRESSURE: 120 MMHG | HEART RATE: 87 BPM | HEIGHT: 64 IN | DIASTOLIC BLOOD PRESSURE: 78 MMHG

## 2024-10-10 DIAGNOSIS — R76.8 ANA POSITIVE: Primary | ICD-10-CM

## 2024-10-10 DIAGNOSIS — M79.7 FIBROMYALGIA: ICD-10-CM

## 2024-10-10 DIAGNOSIS — R76.8 ANA POSITIVE: ICD-10-CM

## 2024-10-10 DIAGNOSIS — R53.83 FATIGUE, UNSPECIFIED TYPE: ICD-10-CM

## 2024-10-10 DIAGNOSIS — M25.50 ARTHRALGIA, UNSPECIFIED JOINT: ICD-10-CM

## 2024-10-10 LAB
BACTERIA UR QL AUTO: ABNORMAL /HPF
BASOPHILS # BLD AUTO: 0.05 10*3/MM3 (ref 0–0.2)
BASOPHILS NFR BLD AUTO: 0.4 % (ref 0–1.5)
BILIRUB UR QL STRIP: NEGATIVE
CLARITY UR: CLEAR
COLOR UR: YELLOW
DEPRECATED RDW RBC AUTO: 39.7 FL (ref 37–54)
EOSINOPHIL # BLD AUTO: 0.07 10*3/MM3 (ref 0–0.4)
EOSINOPHIL NFR BLD AUTO: 0.6 % (ref 0.3–6.2)
ERYTHROCYTE [DISTWIDTH] IN BLOOD BY AUTOMATED COUNT: 11.9 % (ref 12.3–15.4)
ERYTHROCYTE [SEDIMENTATION RATE] IN BLOOD: <1 MM/HR (ref 0–20)
GLUCOSE UR STRIP-MCNC: NEGATIVE MG/DL
HCT VFR BLD AUTO: 44.3 % (ref 34–46.6)
HGB BLD-MCNC: 15.2 G/DL (ref 12–15.9)
HGB UR QL STRIP.AUTO: NEGATIVE
HOLD SPECIMEN: NORMAL
HYALINE CASTS UR QL AUTO: ABNORMAL /LPF
IMM GRANULOCYTES # BLD AUTO: 0.03 10*3/MM3 (ref 0–0.05)
IMM GRANULOCYTES NFR BLD AUTO: 0.2 % (ref 0–0.5)
KETONES UR QL STRIP: NEGATIVE
LEUKOCYTE ESTERASE UR QL STRIP.AUTO: ABNORMAL
LYMPHOCYTES # BLD AUTO: 3.34 10*3/MM3 (ref 0.7–3.1)
LYMPHOCYTES NFR BLD AUTO: 27.3 % (ref 19.6–45.3)
MCH RBC QN AUTO: 31.3 PG (ref 26.6–33)
MCHC RBC AUTO-ENTMCNC: 34.3 G/DL (ref 31.5–35.7)
MCV RBC AUTO: 91.3 FL (ref 79–97)
MONOCYTES # BLD AUTO: 0.67 10*3/MM3 (ref 0.1–0.9)
MONOCYTES NFR BLD AUTO: 5.5 % (ref 5–12)
NEUTROPHILS NFR BLD AUTO: 66 % (ref 42.7–76)
NEUTROPHILS NFR BLD AUTO: 8.08 10*3/MM3 (ref 1.7–7)
NITRITE UR QL STRIP: NEGATIVE
NRBC BLD AUTO-RTO: 0 /100 WBC (ref 0–0.2)
PH UR STRIP.AUTO: 6 [PH] (ref 5–8)
PLATELET # BLD AUTO: 347 10*3/MM3 (ref 140–450)
PMV BLD AUTO: 9.4 FL (ref 6–12)
PROT UR QL STRIP: NEGATIVE
RBC # BLD AUTO: 4.85 10*6/MM3 (ref 3.77–5.28)
RBC # UR STRIP: ABNORMAL /HPF
REF LAB TEST METHOD: ABNORMAL
SP GR UR STRIP: 1.01 (ref 1–1.03)
SQUAMOUS #/AREA URNS HPF: ABNORMAL /HPF
UROBILINOGEN UR QL STRIP: ABNORMAL
WBC # UR STRIP: ABNORMAL /HPF
WBC NRBC COR # BLD AUTO: 12.24 10*3/MM3 (ref 3.4–10.8)

## 2024-10-10 PROCEDURE — 81001 URINALYSIS AUTO W/SCOPE: CPT

## 2024-10-10 PROCEDURE — 86037 ANCA TITER EACH ANTIBODY: CPT

## 2024-10-10 PROCEDURE — 83516 IMMUNOASSAY NONANTIBODY: CPT

## 2024-10-10 PROCEDURE — 82550 ASSAY OF CK (CPK): CPT

## 2024-10-10 PROCEDURE — 86376 MICROSOMAL ANTIBODY EACH: CPT

## 2024-10-10 PROCEDURE — 99204 OFFICE O/P NEW MOD 45 MIN: CPT | Performed by: INTERNAL MEDICINE

## 2024-10-10 PROCEDURE — 81374 HLA I TYPING 1 ANTIGEN LR: CPT

## 2024-10-10 PROCEDURE — 80050 GENERAL HEALTH PANEL: CPT

## 2024-10-10 PROCEDURE — 86038 ANTINUCLEAR ANTIBODIES: CPT

## 2024-10-10 PROCEDURE — 36415 COLL VENOUS BLD VENIPUNCTURE: CPT

## 2024-10-10 PROCEDURE — 83520 IMMUNOASSAY QUANT NOS NONAB: CPT

## 2024-10-10 PROCEDURE — 80074 ACUTE HEPATITIS PANEL: CPT

## 2024-10-10 PROCEDURE — 82085 ASSAY OF ALDOLASE: CPT

## 2024-10-10 PROCEDURE — 84439 ASSAY OF FREE THYROXINE: CPT

## 2024-10-10 PROCEDURE — 86140 C-REACTIVE PROTEIN: CPT

## 2024-10-10 PROCEDURE — 86431 RHEUMATOID FACTOR QUANT: CPT

## 2024-10-10 PROCEDURE — 86480 TB TEST CELL IMMUN MEASURE: CPT

## 2024-10-10 PROCEDURE — 85652 RBC SED RATE AUTOMATED: CPT

## 2024-10-10 PROCEDURE — 87086 URINE CULTURE/COLONY COUNT: CPT

## 2024-10-10 PROCEDURE — 86800 THYROGLOBULIN ANTIBODY: CPT

## 2024-10-10 RX ORDER — CARBAMAZEPINE 200 MG/1
1 TABLET ORAL 3 TIMES DAILY
COMMUNITY
Start: 2024-09-17

## 2024-10-10 NOTE — PROGRESS NOTES
"                 Office Visit       Date: 10/10/2024   Patient Name: Corrine Eaton  MRN: [id number]  YOB: 1990    Referring Physician: Rukhsana Hilliard DO     Chief Complaint   Patient presents with    Joint Pain    Abnormal Lab     MODE positive     Crohn's Disease       History of Present Illness: Corrine Eaton is a 33 y.o. female who is here today  at the request of Dr. Hilliard. The referral indicates that she is MODE + and has joint pain. She has a known history of Crohn's disease and is on adalimumab (Humira).     No recent serious injuries or infections. No fever. She is fatigued. No injection site reactions. No history of uveitis, iritis, or scleritis. No oral, nasal, or genital ulcers. No history of psoriasis, gout, or Raynaud's.     Today she reports having pain throughout her entire body. No swelling. She has muscle pain and weakness. Her muscles cramp. Her back and neck hurt. Her neck is stiff. She has morning stiffness. No red or hot joints. She has had pain for \"years\".     She has been diagnosed with trigeminal neuralgia. She has seen neurology.     No sicca symptoms. No shortness of breath or chest pain. She has both constipation and diarrhea. She has some nausea and heartburn. No  issues. She does not sleep well. She has anxiety and depression. She has seen behavioral health specialists. She has had suicidal ideations in the past. No hair loss. No rash. She has acne. No lymphadenopathy. No abnormal bruising/bleeding. She has headaches. She has numbness/tingling in the extremities.     Historically she was treated for latent TB.     She has been diagnosed with Bipolar disorder.     Medication/treatment/Interventions tried include: Tylenol, gabapentin, Adalimumab, She has seen gastroenterology, she has seen neurology, she has seen behavioral health specialists, Colon resection, Tramadol, Oxcarbazepine, carbamazepine, Motrin, She tries to limit/avoid oral NSAIDS Due to history of " inflammatory bowel disease, Budesonide, prednisone, Citalopram, mesalamine , Trintellix, Botox injections   Studies reviewed included:   6/19/24: Vitamin D low at 22.7, CRP normal, Sed rate normal, RF negative, CPK normal, CMP normal, Hg/Hct 16.3/48.9, CBC ok otherwise, MODE 1:80 Speckled, DS DNA normal, RNP normal, Oh normal, SCL 70 normal, SSA and SSB normal, Chromatin normal, Arabella 1 normal, Centromere normal, TSH normal      Subjective     Review of Systems   Constitutional:  Positive for diaphoresis and fatigue.   HENT: Negative.     Eyes:  Positive for blurred vision, double vision, pain and visual disturbance.   Respiratory:  Positive for shortness of breath.    Cardiovascular: Negative.    Gastrointestinal:  Positive for abdominal pain, constipation, diarrhea, nausea, GERD and indigestion.   Endocrine: Negative.    Genitourinary: Negative.    Musculoskeletal:  Positive for arthralgias, back pain, myalgias, neck pain and neck stiffness.   Skin:  Positive for skin lesions (acne).   Allergic/Immunologic: Negative.    Neurological:  Positive for dizziness, weakness, numbness and headache.   Hematological: Negative.    Psychiatric/Behavioral:  Positive for sleep disturbance, suicidal ideas and depressed mood. The patient is nervous/anxious.    All other systems reviewed and are negative.       Past Medical History:   Diagnosis Date    ADHD (attention deficit hyperactivity disorder)     Anxiety     Cholelithiasis 11/2015    Colon polyp     Depression     GERD (gastroesophageal reflux disease)     Headache     Inflammatory bowel disease     Irritable bowel syndrome 09/2010    Low back pain     Peptic ulceration 9/2021       Past Surgical History:   Procedure Laterality Date    APPENDECTOMY      CHOLECYSTECTOMY      COLON SURGERY      COLONOSCOPY      ENDOMETRIAL ABLATION      TUBAL ABDOMINAL LIGATION  10/2022       Family History   Problem Relation Age of Onset    Hyperlipidemia Mother     Anxiety disorder Father   "   Hyperlipidemia Father     Arthritis Maternal Grandmother     Depression Maternal Grandmother     Mental illness Maternal Grandmother     Other Paternal Grandmother         Parkinsons    Colon cancer Neg Hx     Colon polyps Neg Hx     Esophageal cancer Neg Hx        Social History     Socioeconomic History    Marital status:    Tobacco Use    Smoking status: Never     Passive exposure: Never    Smokeless tobacco: Never   Vaping Use    Vaping status: Never Used   Substance and Sexual Activity    Alcohol use: Yes     Alcohol/week: 5.0 standard drinks of alcohol     Types: 5 Glasses of wine per week    Drug use: Never    Sexual activity: Yes     Partners: Male     Birth control/protection: Surgical         Current Outpatient Medications:     carBAMazepine (TEGretol) 200 MG tablet, Take 1 tablet by mouth 3 times a day., Disp: , Rfl:     Humira, 2 Pen, 40 MG/0.4ML Pen-injector Kit, INJECT 1 PEN UNDER THE SKIN EVERY 14 DAYS., Disp: 2 each, Rfl: 11    lamoTRIgine (LaMICtal) 200 MG tablet, Take 1 tablet by mouth Daily., Disp: 30 tablet, Rfl: 1    ziprasidone (GEODON) 40 MG capsule, Take 1 capsule by mouth 2 (Two) Times a Day With Meals., Disp: 60 capsule, Rfl: 1    Allergies   Allergen Reactions    Imitrex [Sumatriptan] Shortness Of Breath and Headache     Made migraine worse       I reviewed the patient's chief complaint, history of present illness, review of systems, past medical history, surgical history, family history, social history, medications and allergy list.     Objective      Vitals:    10/10/24 1451   BP: 120/78   BP Location: Left arm   Pulse: 87   Temp: 98 °F (36.7 °C)   Weight: 98 kg (216 lb)   Height: 162.6 cm (64\")   PainSc:   9     Body mass index is 37.08 kg/m².       Physical Exam     General: Well appearing 33 year old  female. Not in distress. She is ambulating unassisted.   SKIN: No rashes. No alopecia. No subcutaneous nodules. No digital pits or ulcers. No sclerodactyly.   HEENT: " NCAT. Conjunctiva clear, no photophobia. No oral or nasal ulcers. Hearing intact.    Pulmonary: Clear to auscultation bilaterally. No wheezing, rales, or rhonchi.  CV: Regular rate and rhythm. No murmurs, rubs, or gallops.   Psych: Normal mood and affect. Alert and oriented x 3.   Extremities: No cyanosis or edema.   Musculoskeletal: No joint swelling or tenderness to palpation. No warmth or erythema. Normal range of motion of the wrists, ankles, elbows, and knees.   Lymph: No palpable cervical adenopathy      Procedures    Assessment / Plan      Assessment & Plan  Arthralgia, unspecified joint  Medication/treatment/Interventions tried include: Tylenol, gabapentin, Adalimumab, She has seen gastroenterology, she has seen neurology, she has seen behavioral health specialists, Colon resection, Tramadol, Oxcarbazepine, carbamazepine, Motrin, She tries to limit/avoid oral NSAIDS Due to history of inflammatory bowel disease, Budesonide, prednisone, Citalopram, mesalamine , Trintellix, Botox injections   6/19/24: Vitamin D low at 22.7, CRP normal, Sed rate normal, RF negative, CPK normal, CMP normal, Hg/Hct 16.3/48.9, CBC ok otherwise, MODE 1:80 Speckled, DS DNA normal, RNP normal, Oh normal, SCL 70 normal, SSA and SSB normal, Chromatin normal, Arabella 1 normal, Centromere normal, TSH normal  She was noted to be MODE positive. See below.   We will evaluate further for autoimmune disease/inflammatory types of arthritis.   She has a history of Crohn's disease. She is on Humira.   Patient's with inflammatory bowel disease can have +MODE tests.   Patient's taking TNF inhibitors can have +MODE tests and/or even develop drug induced lupus signs/symptoms. We will evaluate further for this as well.   Her MODE test was 1:80. Typically we consider anything > 1:80 to be positive and anything < 1:80 to be normal. Her result was borderline.   Patient's with inflammatory bowel disease can develop enteropathic arthritis. Humira would be a great  treatment choice if she has/is developing this.   Patient's with inflammatory bowel disease are also prone to having/developing fibromyalgia. This could be what she is experiencing. She reports having pain today throughout her body. See below.   Patient's with inflammatory bowel disease are often/can be HLA B27+. We will check her status today.   Fatigue, unspecified type    Labs and x-rays to evaluate for autoimmune conditions. We usually contact patients with these results within 10-14 business days    MODE positive  We reviewed the outside labs and discussed them at length. All of the patients questions were answered.   Handout on + MODE tests was given to the patient to take home.   An MODE test is a non specific test. While it certainly can be positive in conditions like SLE, scleroderma, myositis, Sjögren's, etc.. It can also be positive in patients with thyroid disease, type 1 diabetes, psoriasis, Celiacs disease, inflammatory bowel disease, etc.. There are also reports of normal/apparently healthy individuals who have been incidentally found to have a +MODE test. You can also sometimes get a false positive MODE test.   Labs today to try and determine the significance of this +MODE test. Patient will be contacted once these test results are available.     Fibromyalgia  1. H & P consistent with this diagnosis.   2. Encourage aerobic activity and sleep hygiene.  3. If she has not had a sleep study/consultation consider getting this done.   4. Tylenol PRN is ok as directed  5. She tries to avoid oral NSAIDS due to history of inflammatory bowel disease.   6. She has seen neurology  7. She has taken gabapentin   8. She has taken Tramadol  9. She has a history of Bipolar. She has seen mental health specialists. She is on treatment for this. I defer this to her mental health specialist. I would not add anything like duloxetine on my own here. Antidepressants can sometimes trigger manic episodes.   10. She has taken  medication like oxcarbazepine and carbamazepine.   11. If autoimmune negative we may try her on pregabalin (Lyrica).     Orders Placed This Encounter   Procedures    14.3.3 ETA, Rheum. Arthritis    MODE 12 Plus Profile (RDL)    MODE by IFA, Reflex to Titer and Pattern    ANCA Panel    CBC Auto Differential    CK    Comprehensive Metabolic Panel    C-reactive Protein    Cyclic Citrul Peptide Antibody, IgG / IgA    HLA-B27 Antigen    RF Isotypes, IgG, IgA, IgM EIA    Sedimentation Rate    TSH+Free T4    Thyroid Antibodies    Urinalysis With Culture If Indicated -    Aldolase    QuantiFERON-TB Gold Plus    Hepatitis Panel, Acute            Follow Up:   Return in about 3 months (around 1/10/2025).    Vasiliy Leary DO  [facility]

## 2024-10-11 LAB
ALBUMIN SERPL-MCNC: 4.3 G/DL (ref 3.5–5.2)
ALBUMIN/GLOB SERPL: 1.4 G/DL
ALP SERPL-CCNC: 56 U/L (ref 39–117)
ALT SERPL W P-5'-P-CCNC: 20 U/L (ref 1–33)
ANION GAP SERPL CALCULATED.3IONS-SCNC: 12 MMOL/L (ref 5–15)
AST SERPL-CCNC: 14 U/L (ref 1–32)
BILIRUB SERPL-MCNC: <0.2 MG/DL (ref 0–1.2)
BUN SERPL-MCNC: 10 MG/DL (ref 6–20)
BUN/CREAT SERPL: 10.2 (ref 7–25)
CALCIUM SPEC-SCNC: 9.3 MG/DL (ref 8.6–10.5)
CHLORIDE SERPL-SCNC: 105 MMOL/L (ref 98–107)
CK SERPL-CCNC: 99 U/L (ref 20–180)
CO2 SERPL-SCNC: 21 MMOL/L (ref 22–29)
CREAT SERPL-MCNC: 0.98 MG/DL (ref 0.57–1)
CRP SERPL-MCNC: 0.38 MG/DL (ref 0–0.5)
EGFRCR SERPLBLD CKD-EPI 2021: 77.8 ML/MIN/1.73
GLOBULIN UR ELPH-MCNC: 3 GM/DL
GLUCOSE SERPL-MCNC: 123 MG/DL (ref 65–99)
HAV IGM SERPL QL IA: NORMAL
HBV CORE IGM SERPL QL IA: NORMAL
HBV SURFACE AG SERPL QL IA: NORMAL
HCV AB SER QL: NORMAL
POTASSIUM SERPL-SCNC: 3.7 MMOL/L (ref 3.5–5.2)
PROT SERPL-MCNC: 7.3 G/DL (ref 6–8.5)
SODIUM SERPL-SCNC: 138 MMOL/L (ref 136–145)
T4 FREE SERPL-MCNC: 1.32 NG/DL (ref 0.92–1.68)
TSH SERPL DL<=0.05 MIU/L-ACNC: 1.7 UIU/ML (ref 0.27–4.2)

## 2024-10-11 RX ORDER — NITROFURANTOIN 25; 75 MG/1; MG/1
100 CAPSULE ORAL 2 TIMES DAILY
Qty: 14 CAPSULE | Refills: 0 | Status: SHIPPED | OUTPATIENT
Start: 2024-10-11

## 2024-10-12 LAB — BACTERIA SPEC AEROBE CULT: NO GROWTH

## 2024-10-14 LAB
ALDOLASE SERPL-CCNC: 5.4 U/L (ref 3.3–10.3)
THYROGLOB AB SERPL-ACNC: <1 IU/ML (ref 0–0.9)
THYROPEROXIDASE AB SERPL-ACNC: <9 IU/ML (ref 0–34)

## 2024-10-15 LAB
C-ANCA TITR SER IF: NORMAL TITER
GAMMA INTERFERON BACKGROUND BLD IA-ACNC: 0.01 IU/ML
M TB IFN-G BLD-IMP: NEGATIVE
M TB IFN-G CD4+ BCKGRND COR BLD-ACNC: 0.04 IU/ML
M TB IFN-G CD4+CD8+ BCKGRND COR BLD-ACNC: 0.03 IU/ML
MITOGEN IGNF BCKGRD COR BLD-ACNC: >10 IU/ML
MYELOPEROXIDASE AB SER IA-ACNC: <0.2 UNITS (ref 0–0.9)
P-ANCA ATYPICAL TITR SER IF: NORMAL TITER
P-ANCA TITR SER IF: NORMAL TITER
PROTEINASE3 AB SER IA-ACNC: <0.2 UNITS (ref 0–0.9)
QUANTIFERON INCUBATION: NORMAL
SERVICE CMNT-IMP: NORMAL

## 2024-10-16 LAB — HLA-B27 QL NAA+PROBE: NEGATIVE

## 2024-10-21 LAB — 14-3-3 ETA AG SER IA-MCNC: <0.2 NG/ML

## 2024-10-23 LAB
RF IGA SER-ACNC: <7 U
RF IGG SER-ACNC: <7 U
RF IGM SER IA-ACNC: <7 U

## 2024-10-27 LAB
ANA PLUS 12 INTERPRETATION: ABNORMAL
ANA SER QL IF: POSITIVE
ANA SPECKLED TITR SER: ABNORMAL {TITER}
C3 SERPL-MCNC: 188 MG/DL (ref 82–167)
C4 SERPL-MCNC: 44 MG/DL (ref 14–44)
CARDIOLIPIN IGA SER IA-ACNC: <12 APL U/ML
CARDIOLIPIN IGG SER IA-ACNC: <15 GPL U/ML
CARDIOLIPIN IGM SER IA-ACNC: <13 MPL U/ML
CCP IGA+IGG SERPL IA-ACNC: <20 UNITS
CENTROMERE AB TITR SER IF: ABNORMAL {TITER}
CHROMATIN IGG SERPL-ACNC: <20 UNITS
DSDNA AB SER FARR-ACNC: <8 IU/ML
ENA SCL70 AB SER IA-ACNC: <20 UNITS
ENA SM AB SER-ACNC: <20 UNITS
ENA SS-A AB SER IA-ACNC: <20 UNITS
ENA SS-B AB SER IA-ACNC: <20 UNITS
LABORATORY COMMENT REPORT: ABNORMAL
RHEUMATOID FACT SERPL-ACNC: <14 IU/ML
THYROPEROXIDASE AB SERPL-ACNC: <9 IU/ML
U1 SNRNP AB SER IA-ACNC: <20 UNITS

## 2024-11-04 ENCOUNTER — SPECIALTY PHARMACY (OUTPATIENT)
Dept: ONCOLOGY | Facility: HOSPITAL | Age: 34
End: 2024-11-04
Payer: COMMERCIAL

## 2024-11-04 ENCOUNTER — TELEPHONE (OUTPATIENT)
Dept: NEUROLOGY | Facility: CLINIC | Age: 34
End: 2024-11-04
Payer: COMMERCIAL

## 2024-11-20 ENCOUNTER — OFFICE VISIT (OUTPATIENT)
Dept: BEHAVIORAL HEALTH | Facility: CLINIC | Age: 34
End: 2024-11-20
Payer: COMMERCIAL

## 2024-11-20 VITALS
SYSTOLIC BLOOD PRESSURE: 146 MMHG | WEIGHT: 216 LBS | BODY MASS INDEX: 36.88 KG/M2 | HEIGHT: 64 IN | DIASTOLIC BLOOD PRESSURE: 98 MMHG | HEART RATE: 101 BPM

## 2024-11-20 DIAGNOSIS — F31.32 BIPOLAR AFFECTIVE DISORDER, CURRENTLY DEPRESSED, MODERATE: ICD-10-CM

## 2024-11-20 RX ORDER — ZIPRASIDONE HYDROCHLORIDE 60 MG/1
60 CAPSULE ORAL 2 TIMES DAILY WITH MEALS
Qty: 60 CAPSULE | Refills: 1 | Status: SHIPPED | OUTPATIENT
Start: 2024-11-20

## 2024-11-20 NOTE — PROGRESS NOTES
"     Office  Follow Up Visit      Patient Name: Corrine Eaton  : 1990   MRN: [id number]     Referring Provider: Rukhsana Hilliard DO    Chief Complaint: Medication follow-up    ICD-10-CM ICD-9-CM   1. Bipolar affective disorder, currently depressed, moderate  F31.32 296.52        History of Present Illness:   Corrine Eaton is a 34 y.o. female who is here today for follow up related to medication management of bipolar disorder.  Medication regimen includes lamotrigine 200 mg daily and ziprasidone 40 mg twice daily.    Patient reports that things have been \"bad\" since her last follow-up.  She reports that she has seen a significant increase in depressive symptoms, fatigue, decreased motivation, and anhedonia.  She reports that she was feeling more manic at her previous follow-up in which ziprasidone was initiated.  However, she affirms that depression is her biggest concern at this time.  She reports that she is not experiencing adverse effect since initiating ziprasidone.  She also reports that she was diagnosed with trigeminal neuralgia by her neurologist and she is currently taking carbamazepine 20 mg 3 times daily.      Subjective      Review of Systems:   Review of Systems   Constitutional:  Positive for fatigue.   Respiratory: Negative.     Cardiovascular: Negative.    Gastrointestinal: Negative.    Genitourinary: Negative.    Musculoskeletal: Negative.    Neurological: Negative.    Psychiatric/Behavioral:  Positive for dysphoric mood.        Patient History:   The following portions of the patient's history were reviewed and updated as appropriate: allergies, current medications, past family history, past medical history, past social history, past surgical history and problem list.     Social:  No change in interval history.    Medications:     Current Outpatient Medications:     ziprasidone (GEODON) 60 MG capsule, Take 1 capsule by mouth 2 (Two) Times a Day With Meals., Disp: 60 capsule, Rfl: " "1    carBAMazepine (TEGretol) 200 MG tablet, Take 1 tablet by mouth 3 times a day., Disp: , Rfl:     Humira, 2 Pen, 40 MG/0.4ML Pen-injector Kit, INJECT 1 PEN UNDER THE SKIN EVERY 14 DAYS., Disp: 2 each, Rfl: 11    lamoTRIgine (LaMICtal) 200 MG tablet, Take 1 tablet by mouth Daily., Disp: 30 tablet, Rfl: 1    nitrofurantoin, macrocrystal-monohydrate, (Macrobid) 100 MG capsule, Take 1 capsule by mouth 2 (Two) Times a Day., Disp: 14 capsule, Rfl: 0    Objective     Physical Exam:  Vital Signs:   Vitals:    11/20/24 1612   BP: 146/98   Pulse: 101   Weight: 98 kg (216 lb)   Height: 162.6 cm (64\")     Body mass index is 37.08 kg/m².     Mental Status Exam:   Hygiene:   good  Cooperation:  Cooperative  Eye Contact:  Good  Psychomotor Behavior:  Appropriate  Affect:  Appropriate  Mood: depressed  Speech:  Normal  Thought Process:  Circum  Thought Content:  Mood congruent  Suicidal:  None  Homicidal:  None  Hallucinations:  None  Delusion:  None  Memory:  Intact  Orientation:  Grossly intact  Reliability:  good  Insight:  Good  Judgement:  Fair  Impulse Control:  Fair  Physical/Medical Issues:  No      Assessment / Plan      Visit Diagnosis/Orders Placed This Visit:  Diagnoses and all orders for this visit:    1. Bipolar affective disorder, currently depressed, moderate  -     ziprasidone (GEODON) 60 MG capsule; Take 1 capsule by mouth 2 (Two) Times a Day With Meals.  Dispense: 60 capsule; Refill: 1         Differential:   N/A    Plan:   Increase the trazodone to 60 mg twice daily.  If dose increase does not start to alleviate depressive symptoms we will look to switch to Vraylar 1.5 mg with a cross titration.   Will send patient a message via my chart so she keep me informed of her progress.    Continue supportive psychotherapy efforts and medications as indicated. Treatment and medication options discussed during today's visit. Patient ackowledged and verbally consented to continue with current treatment plan and was " educated on the importance of compliance with treatment and follow-up appointments. Patient seems reasonably able to adhere to treatment plan.      Medication Considerations:  Discussed medication options and treatment plan of prescribed medication(s) as well as the risks, benefits, and potential side effects. Patient is agreeable to call the office with any worsening of symptoms or onset of side effects. Patient is agreeable to call 911 or go to the nearest ER should he/she begin having SI/HI.    Quality Measures:   Never smoker    I advised Corrine Eaton of the risks of tobacco use.     Follow Up:   Return in about 1 month (around 12/20/2024) for Recheck.      SCHUYLER Hawkins, PMHNP-BC

## 2024-12-03 ENCOUNTER — TELEPHONE (OUTPATIENT)
Age: 34
End: 2024-12-03
Payer: COMMERCIAL

## 2024-12-03 ENCOUNTER — PATIENT MESSAGE (OUTPATIENT)
Dept: BEHAVIORAL HEALTH | Facility: CLINIC | Age: 34
End: 2024-12-03
Payer: COMMERCIAL

## 2024-12-03 ENCOUNTER — TELEPHONE (OUTPATIENT)
Dept: BEHAVIORAL HEALTH | Facility: CLINIC | Age: 34
End: 2024-12-03
Payer: COMMERCIAL

## 2024-12-03 DIAGNOSIS — F31.32 BIPOLAR AFFECTIVE DISORDER, CURRENTLY DEPRESSED, MODERATE: Primary | ICD-10-CM

## 2024-12-03 NOTE — TELEPHONE ENCOUNTER
Patient called and inquired about labs that had been previously discussed with her and again today. Patient states her joints and muscles still hurts and she was wanting to know if next OV this will be discussed again in case additional test needs to done.

## 2024-12-13 RX ORDER — ZIPRASIDONE HYDROCHLORIDE 20 MG/1
20 CAPSULE ORAL EVERY MORNING
Qty: 30 CAPSULE | Refills: 1 | Status: SHIPPED | OUTPATIENT
Start: 2024-12-13

## 2024-12-13 RX ORDER — ZIPRASIDONE HYDROCHLORIDE 60 MG/1
60 CAPSULE ORAL NIGHTLY
Qty: 30 CAPSULE | Refills: 1 | Status: SHIPPED | OUTPATIENT
Start: 2024-12-13

## 2024-12-19 ENCOUNTER — OFFICE VISIT (OUTPATIENT)
Dept: FAMILY MEDICINE CLINIC | Facility: CLINIC | Age: 34
End: 2024-12-19
Payer: COMMERCIAL

## 2024-12-19 VITALS
DIASTOLIC BLOOD PRESSURE: 72 MMHG | SYSTOLIC BLOOD PRESSURE: 114 MMHG | TEMPERATURE: 97.7 F | OXYGEN SATURATION: 96 % | RESPIRATION RATE: 18 BRPM | HEART RATE: 78 BPM | WEIGHT: 215.8 LBS | BODY MASS INDEX: 36.84 KG/M2 | HEIGHT: 64 IN

## 2024-12-19 DIAGNOSIS — E55.9 VITAMIN D DEFICIENCY: ICD-10-CM

## 2024-12-19 DIAGNOSIS — R53.83 OTHER FATIGUE: ICD-10-CM

## 2024-12-19 DIAGNOSIS — G50.0 RIGHT TRIGEMINAL NEURALGIA: Primary | ICD-10-CM

## 2024-12-19 DIAGNOSIS — N92.6 IRREGULAR PERIODS: ICD-10-CM

## 2024-12-19 PROCEDURE — 99214 OFFICE O/P EST MOD 30 MIN: CPT | Performed by: FAMILY MEDICINE

## 2024-12-19 RX ORDER — HYDROCODONE BITARTRATE AND ACETAMINOPHEN 5; 325 MG/1; MG/1
1 TABLET ORAL EVERY 6 HOURS PRN
Qty: 10 TABLET | Refills: 0 | Status: SHIPPED | OUTPATIENT
Start: 2024-12-19

## 2024-12-19 RX ORDER — METHYLPREDNISOLONE 4 MG/1
TABLET ORAL
Qty: 21 TABLET | Refills: 0 | Status: SHIPPED | OUTPATIENT
Start: 2024-12-19

## 2024-12-19 NOTE — PROGRESS NOTES
"Chief Complaint  6 mo f/u    Subjective          Corrine Eaton presents to [facility]  History of Present Illness   She is seeing behavioral health, doing well with current treatment. Stating that he \"saved her life\". Doing well with her current treatment.     She is seeing Dr. Bryant Wilson MD in Sept 2024 for treatment of right trigeminal neuralgia. Per his note \"Finding has been discussed with the patient in detail. Carbamazepine 200 mg, she is to titrate to 1 tablet 3 times a day over the next week. Tramadol as rescue medication. Return in 2 months. Finding has been discussed with the patient in detail. Continue Tegretol 200 mg 3 times a day. Medrol Dosepak. Hydrocodone 5 mg as rescue medication. Return in 3 months.\"  She is starting to experience pain in right side of face, feels like a flare up is starting. In the past, steroid and Norco 5 mg have been effective at relieving symptoms.     She has developed irregular periods, with variation of flow.   Chronic exhaustion   She has had tubal ligation    The following portions of the patient's history were reviewed and updated as appropriate: allergies, current medications, past family history, past medical history, past social history, past surgical history, and problem list.    Objective      Physical Exam  Vitals reviewed.   Cardiovascular:      Rate and Rhythm: Normal rate.      Heart sounds: Normal heart sounds.   Pulmonary:      Effort: Pulmonary effort is normal.      Breath sounds: Normal breath sounds.   Neurological:      Mental Status: She is alert.        Result Review :                Assessment and Plan    Diagnoses and all orders for this visit:    1. Right trigeminal neuralgia (Primary)  -     methylPREDNISolone (MEDROL) 4 MG dose pack; Take as directed on package instructions.  Dispense: 21 tablet; Refill: 0  -     HYDROcodone-acetaminophen (Norco) 5-325 MG per tablet; Take 1 tablet by mouth Every 6 (Six) " Hours As Needed for Moderate Pain.  Dispense: 10 tablet; Refill: 0  -     Vitamin D,25-Hydroxy; Future  -     Vitamin B12; Future  -     TSH+Free T4; Future  -     CBC (No Diff); Future  -     Comprehensive Metabolic Panel; Future  -     Iron; Future  -     Hemoglobin A1c; Future  -     FSH & LH; Future  -     Estradiol; Future    2. Other fatigue  -     Vitamin D,25-Hydroxy; Future  -     Vitamin B12; Future  -     TSH+Free T4; Future  -     CBC (No Diff); Future  -     Comprehensive Metabolic Panel; Future  -     Iron; Future  -     Hemoglobin A1c; Future  -     FSH & LH; Future  -     Estradiol; Future    3. Irregular periods  -     Vitamin D,25-Hydroxy; Future  -     Vitamin B12; Future  -     TSH+Free T4; Future  -     CBC (No Diff); Future  -     Comprehensive Metabolic Panel; Future  -     Iron; Future  -     Hemoglobin A1c; Future  -     FSH & LH; Future  -     Estradiol; Future    4. Vitamin D deficiency  -     Vitamin D,25-Hydroxy; Future    Return to complete labs  Symptoms of trigeminal neuralgia are increasing. Has responded well to steroid taper and temporary pain medication in past, will send to pharmacy.   SYLWIA query complete. Treatment plan to include limited course of prescribed controlled substance. Risks including addiction, benefits, and alternatives presented to patient.       Follow Up   Return if symptoms worsen or fail to improve.  Patient was given instructions and counseling regarding her condition or for health maintenance advice. Please see specific information pulled into the AVS if appropriate.

## 2024-12-26 ENCOUNTER — TELEPHONE (OUTPATIENT)
Dept: GASTROENTEROLOGY | Facility: CLINIC | Age: 34
End: 2024-12-26
Payer: COMMERCIAL

## 2024-12-26 ENCOUNTER — PRIOR AUTHORIZATION (OUTPATIENT)
Dept: GASTROENTEROLOGY | Facility: CLINIC | Age: 34
End: 2024-12-26
Payer: COMMERCIAL

## 2025-01-13 PROBLEM — R76.8 POSITIVE ANA (ANTINUCLEAR ANTIBODY): Status: ACTIVE | Noted: 2025-01-13

## 2025-01-13 PROBLEM — M79.7 FIBROMYALGIA: Status: ACTIVE | Noted: 2025-01-13

## 2025-01-13 NOTE — ASSESSMENT & PLAN NOTE
1.  We would not add something such as Cymbalta as she does see mental health specialist for her bipolar treatment.  2.  We can trial Lyrica to see if this will help.  She will talk to Dr. Wilson about this in 2 days at her visit for trigeminal neuralgia.  He has talked about prescribing it historically.  If he chooses not to start Lyrica she can let us know.  3.  Weight loss would be helpful  4.  Exercise such as water aerobics and gentle stretching and quinten chi have been shown to be useful for fibromyalgia  5.  Patients with fibromyalgia may benefit from therapies such as massage, physical therapy and mental health therapy.

## 2025-01-13 NOTE — ASSESSMENT & PLAN NOTE
1.  Continue treatment with GI  2.  Patient's with Crohn's can certainly develop enteropathic arthritis  3.  Continue Humira.  If she does have enteropathic arthritis Humira would be a great choice for treatment.  4.  Patients with inflammatory bowel disorders may develop fibromyalgia.

## 2025-01-13 NOTE — ASSESSMENT & PLAN NOTE
10/10/2024: MODE 1: 40 with negative ELISE.  Autoimmune testing was unrevealing.  She did have bacteria in the urine but the culture showed no growth.  White count was elevated at 12.24.    We did discuss that a positive MODE test does not necessarily mean that she has an autoimmune disease.  This is something we will watch over time.  But as of now there are no signs of inflammatory arthritis such as lupus.

## 2025-01-14 ENCOUNTER — OFFICE VISIT (OUTPATIENT)
Age: 35
End: 2025-01-14
Payer: COMMERCIAL

## 2025-01-14 VITALS
HEIGHT: 64 IN | BODY MASS INDEX: 36.69 KG/M2 | SYSTOLIC BLOOD PRESSURE: 108 MMHG | TEMPERATURE: 97.3 F | DIASTOLIC BLOOD PRESSURE: 72 MMHG | WEIGHT: 214.9 LBS | HEART RATE: 104 BPM

## 2025-01-14 DIAGNOSIS — M79.7 FIBROMYALGIA: ICD-10-CM

## 2025-01-14 DIAGNOSIS — K50.90 CROHN'S DISEASE WITHOUT COMPLICATION, UNSPECIFIED GASTROINTESTINAL TRACT LOCATION: ICD-10-CM

## 2025-01-14 DIAGNOSIS — R76.8 POSITIVE ANA (ANTINUCLEAR ANTIBODY): Primary | ICD-10-CM

## 2025-01-14 PROCEDURE — 99214 OFFICE O/P EST MOD 30 MIN: CPT | Performed by: NURSE PRACTITIONER

## 2025-01-14 RX ORDER — OXCARBAZEPINE 300 MG/1
300 TABLET, FILM COATED ORAL 3 TIMES DAILY
COMMUNITY

## 2025-01-14 NOTE — PROGRESS NOTES
Office Visit       Date: 01/14/2025   Patient Name: Corrine Eaton  MRN: [id number]  YOB: 1990    Referring Physician: No ref. provider found     Chief Complaint:   Chief Complaint   Patient presents with    Abnormal Lab     MODE positive       History of Present Illness: Corrine Eaton is a 34 y.o. female who is here today for follow-up as a first-time return.  She was seen for joint and muscle pain.  She was found to have a borderline positive MODE.  All other autoimmune testing was negative.  Today she reports feeling the same.  She rates her pain 6 out of 10 and has 30 minutes of morning stiffness.  Currently she is prescribed Humira for this disease.  He has also been diagnosed with fibromyalgia.  She has been diagnosed with trigeminal neuralgia.  This has been very active since Christmas 2024.    Subjective   Review of Systems:  Positive for postnasal drip, sinus pressure, sneezing, sore throat, swollen glands, ringing in the ears, dry mouth, cough, joint pain, back pain, muscle pain, neck pain, neck stiffness, headaches, lightheadedness, depressed mood, dissatisfied mood, nervousness anxiety, sleep disturbance and stress.  All other review of symptoms are negative.    Past Medical History:   Past Medical History:   Diagnosis Date    ADHD (attention deficit hyperactivity disorder)     Anxiety     Cholelithiasis 11/2015    Colon polyp     Depression     GERD (gastroesophageal reflux disease)     Headache     Inflammatory bowel disease     Irritable bowel syndrome 09/2010    Low back pain     Peptic ulceration 9/2021       Past Surgical History:   Past Surgical History:   Procedure Laterality Date    APPENDECTOMY      CHOLECYSTECTOMY      COLON SURGERY      COLONOSCOPY      ENDOMETRIAL ABLATION      TUBAL ABDOMINAL LIGATION  10/2022       Family History:   Family History   Problem Relation Age of Onset    Hyperlipidemia Mother     Anxiety disorder Father     Hyperlipidemia Father      Arthritis Maternal Grandmother     Depression Maternal Grandmother     Mental illness Maternal Grandmother     Other Paternal Grandmother         Parkinsons    Colon cancer Neg Hx     Colon polyps Neg Hx     Esophageal cancer Neg Hx        Social History:   Social History     Socioeconomic History    Marital status:    Tobacco Use    Smoking status: Never     Passive exposure: Never    Smokeless tobacco: Never   Vaping Use    Vaping status: Never Used   Substance and Sexual Activity    Alcohol use: Yes     Alcohol/week: 5.0 standard drinks of alcohol     Types: 5 Glasses of wine per week    Drug use: Never    Sexual activity: Yes     Partners: Male     Birth control/protection: Surgical       Medications:   Current Outpatient Medications:     carBAMazepine (TEGretol) 200 MG tablet, Take 1 tablet by mouth 3 times a day., Disp: , Rfl:     Humira, 2 Pen, 40 MG/0.4ML Pen-injector Kit, INJECT 1 PEN UNDER THE SKIN EVERY 14 DAYS., Disp: 2 each, Rfl: 11    HYDROcodone-acetaminophen (Norco) 5-325 MG per tablet, Take 1 tablet by mouth Every 6 (Six) Hours As Needed for Moderate Pain., Disp: 10 tablet, Rfl: 0    lamoTRIgine (LaMICtal) 200 MG tablet, Take 1 tablet by mouth Daily., Disp: 30 tablet, Rfl: 1    methylPREDNISolone (MEDROL) 4 MG dose pack, Take as directed on package instructions., Disp: 21 tablet, Rfl: 0    OXcarbazepine (TRILEPTAL) 300 MG tablet, Take 1 tablet by mouth 3 times a day., Disp: , Rfl:     ziprasidone (GEODON) 20 MG capsule, Take 1 capsule by mouth Every Morning., Disp: 30 capsule, Rfl: 1    ziprasidone (Geodon) 60 MG capsule, Take 1 capsule by mouth Every Night., Disp: 30 capsule, Rfl: 1    Allergies:   Allergies   Allergen Reactions    Imitrex [Sumatriptan] Shortness Of Breath and Headache     Made migraine worse       I have reviewed and updated the patient's chief complaint, history of present illness, review of systems, past medical history, surgical history, family history, social  "history, medications and allergy list as appropriate.     Objective    Vital Signs:   Vitals:    01/14/25 1457   BP: 108/72   BP Location: Left arm   Patient Position: Sitting   Cuff Size: Large Adult   Pulse: 104   Temp: 97.3 °F (36.3 °C)   Weight: 97.5 kg (214 lb 14.4 oz)   Height: 162.6 cm (64.02\")   PainSc:   6     Body mass index is 36.87 kg/m².           Physical Exam:  Physical Exam  Vitals reviewed.   Constitutional:       Appearance: Normal appearance.   HENT:      Head: Normocephalic and atraumatic.      Mouth/Throat:      Mouth: Mucous membranes are moist.   Eyes:      Conjunctiva/sclera: Conjunctivae normal.   Cardiovascular:      Rate and Rhythm: Normal rate and regular rhythm.      Pulses: Normal pulses.      Heart sounds: Normal heart sounds.   Pulmonary:      Effort: Pulmonary effort is normal.      Breath sounds: Normal breath sounds.   Musculoskeletal:         General: Normal range of motion.      Cervical back: Normal range of motion and neck supple.      Comments: 4/18 myofascial tender points  No synovitis  No OA changes noted   Skin:     General: Skin is warm and dry.   Neurological:      General: No focal deficit present.      Mental Status: She is alert and oriented to person, place, and time. Mental status is at baseline.   Psychiatric:         Mood and Affect: Mood normal.         Behavior: Behavior normal.         Thought Content: Thought content normal.         Judgment: Judgment normal.          Results Review:   Imaging Results (Last 24 Hours)       ** No results found for the last 24 hours. **            Procedures    Assessment / Plan    Assessment/Plan:   Diagnoses and all orders for this visit:    1. Positive MODE (antinuclear antibody) (Primary)  Assessment & Plan:  10/10/2024: MODE 1: 40 with negative ELISE.  Autoimmune testing was unrevealing.  She did have bacteria in the urine but the culture showed no growth.  White count was elevated at 12.24.    We did discuss that a positive " MODE test does not necessarily mean that she has an autoimmune disease.  This is something we will watch over time.  But as of now there are no signs of inflammatory arthritis such as lupus.      2. Crohn's disease without complication, unspecified gastrointestinal tract location  Assessment & Plan:  1.  Continue treatment with GI  2.  Patient's with Crohn's can certainly develop enteropathic arthritis  3.  Continue Humira.  If she does have enteropathic arthritis Humira would be a great choice for treatment.  4.  Patients with inflammatory bowel disorders may develop fibromyalgia.      3. Fibromyalgia  Assessment & Plan:  1.  We would not add something such as Cymbalta as she does see mental health specialist for her bipolar treatment.  2.  We can trial Lyrica to see if this will help.  She will talk to Dr. Wilson about this in 2 days at her visit for trigeminal neuralgia.  He has talked about prescribing it historically.  If he chooses not to start Lyrica she can let us know.  3.  Weight loss would be helpful  4.  Exercise such as water aerobics and gentle stretching and quinten chi have been shown to be useful for fibromyalgia  5.  Patients with fibromyalgia may benefit from therapies such as massage, physical therapy and mental health therapy.          Follow Up:   Return in about 4 months (around 5/14/2025) for Dr. Leary.        SCHUYLER Munoz   [facility]

## 2025-01-22 ENCOUNTER — OFFICE VISIT (OUTPATIENT)
Dept: BEHAVIORAL HEALTH | Facility: CLINIC | Age: 35
End: 2025-01-22
Payer: COMMERCIAL

## 2025-01-22 VITALS
WEIGHT: 214 LBS | DIASTOLIC BLOOD PRESSURE: 97 MMHG | HEART RATE: 76 BPM | BODY MASS INDEX: 36.54 KG/M2 | SYSTOLIC BLOOD PRESSURE: 140 MMHG | HEIGHT: 64 IN

## 2025-01-22 DIAGNOSIS — F31.31 BIPOLAR AFFECTIVE DISORDER, CURRENTLY DEPRESSED, MILD: Primary | ICD-10-CM

## 2025-01-22 DIAGNOSIS — G89.29 CHRONIC IDIOPATHIC FACIAL PAIN: ICD-10-CM

## 2025-01-22 DIAGNOSIS — R51.9 CHRONIC IDIOPATHIC FACIAL PAIN: ICD-10-CM

## 2025-01-22 RX ORDER — ZIPRASIDONE HYDROCHLORIDE 60 MG/1
60 CAPSULE ORAL NIGHTLY
Qty: 30 CAPSULE | Refills: 1 | Status: SHIPPED | OUTPATIENT
Start: 2025-01-22

## 2025-01-22 RX ORDER — ZIPRASIDONE HYDROCHLORIDE 40 MG/1
40 CAPSULE ORAL EVERY MORNING
Qty: 30 CAPSULE | Refills: 1 | Status: SHIPPED | OUTPATIENT
Start: 2025-01-22

## 2025-01-22 RX ORDER — CLONAZEPAM 0.5 MG/1
0.5 TABLET ORAL 2 TIMES DAILY
Qty: 20 TABLET | Refills: 0 | Status: SHIPPED | OUTPATIENT
Start: 2025-01-22

## 2025-01-22 NOTE — PROGRESS NOTES
Office  Follow Up Visit      Patient Name: Corrine Eaton  : 1990   MRN: [id number]     Referring Provider: Rukhsana Hilliard DO    Chief Complaint: Medication follow-up    ICD-10-CM ICD-9-CM   1. Bipolar affective disorder, currently depressed, mild  F31.31 296.51   2. Chronic idiopathic facial pain  R51.9 784.0    G89.29 338.29        History of Present Illness:   Corrine Eaton is a 34 y.o. female who is here today for follow up related to patient management of bipolar disorder.  Medication regimen includes lamotrigine 200 mg daily and ziprasidone 20 mg in the morning and ziprasidone 60 mg in the evening.    Patient reports that she is still experiencing some periods of depressed mood and sadness.  She reports that some of this correlates when she experiences exacerbation of her trigeminal neuralgia and pain associated with her fibromyalgia.  She reports that her trigeminal neuralgia has improved since her neurologist has put her on Tegretol.      Patient previously been on ziprasidone 60 mg twice daily.  Ever her morning dose was decreased related to morning time and daytime sedation.  However, patient reports that she had really good mood stability when she was taking 60 mg twice daily.  She reports before the decrease in her morning dose on a scale of 1-10, 10 being the best mood, she was around 8 or 9.  However, she reports she is now around a 6 with the lower morning dose.  She reports continued continuous underlying facial pain related to her trigeminal neuralgia with exacerbations related to the weather and stress.      Subjective      Review of Systems:   Review of Systems   Constitutional: Negative.    Respiratory: Negative.     Cardiovascular: Negative.    Gastrointestinal: Negative.    Genitourinary: Negative.    Musculoskeletal: Negative.    Neurological: Negative.    Psychiatric/Behavioral:  Positive for dysphoric mood.        Patient History:   The following portions of the  "patient's history were reviewed and updated as appropriate: allergies, current medications, past family history, past medical history, past social history, past surgical history and problem list.     Social:  No change in interval history.    Medications:     Current Outpatient Medications:     ziprasidone (GEODON) 40 MG capsule, Take 1 capsule by mouth Every Morning., Disp: 30 capsule, Rfl: 1    ziprasidone (Geodon) 60 MG capsule, Take 1 capsule by mouth Every Night., Disp: 30 capsule, Rfl: 1    carBAMazepine (TEGretol) 200 MG tablet, Take 1 tablet by mouth 3 times a day., Disp: , Rfl:     clonazePAM (KlonoPIN) 0.5 MG tablet, Take 1 tablet by mouth 2 (Two) Times a Day., Disp: 20 tablet, Rfl: 0    Humira, 2 Pen, 40 MG/0.4ML Pen-injector Kit, INJECT 1 PEN UNDER THE SKIN EVERY 14 DAYS., Disp: 2 each, Rfl: 11    HYDROcodone-acetaminophen (Norco) 5-325 MG per tablet, Take 1 tablet by mouth Every 6 (Six) Hours As Needed for Moderate Pain., Disp: 10 tablet, Rfl: 0    lamoTRIgine (LaMICtal) 200 MG tablet, Take 1 tablet by mouth Daily., Disp: 30 tablet, Rfl: 1    methylPREDNISolone (MEDROL) 4 MG dose pack, Take as directed on package instructions., Disp: 21 tablet, Rfl: 0    OXcarbazepine (TRILEPTAL) 300 MG tablet, Take 1 tablet by mouth 3 times a day., Disp: , Rfl:     Objective     Physical Exam:  Vital Signs:   Vitals:    01/22/25 1524   BP: 140/97   Pulse: 76   Weight: 97.1 kg (214 lb)   Height: 162.6 cm (64\")     Body mass index is 36.73 kg/m².     Mental Status Exam:   Hygiene:   good  Cooperation:  Cooperative  Eye Contact:  Good  Psychomotor Behavior:  Appropriate  Affect:  Appropriate  Mood: depressed  Speech:  Normal  Thought Process:  Circum  Thought Content:  Mood congruent  Suicidal:  None  Homicidal:  None  Hallucinations:  None  Delusion:  None  Memory:  Intact  Orientation:  Grossly intact  Reliability:  good  Insight:  Good  Judgement:  Good  Impulse Control:  Fair  Physical/Medical Issues:  Yes multiple " medical comorbidities, see chart      Assessment / Plan      Visit Diagnosis/Orders Placed This Visit:  Diagnoses and all orders for this visit:    1. Bipolar affective disorder, currently depressed, mild (Primary)  -     ziprasidone (GEODON) 40 MG capsule; Take 1 capsule by mouth Every Morning.  Dispense: 30 capsule; Refill: 1  -     ziprasidone (Geodon) 60 MG capsule; Take 1 capsule by mouth Every Night.  Dispense: 30 capsule; Refill: 1    2. Chronic idiopathic facial pain  -     clonazePAM (KlonoPIN) 0.5 MG tablet; Take 1 tablet by mouth 2 (Two) Times a Day.  Dispense: 20 tablet; Refill: 0         Differential:   N/A    Plan:   Increase morning ziprasidone dose to 40 mg.  Continued ziprasidone 60 mg in the evening.  Rechallenge clonazepam where patient is now on Tegretol related to her trigeminal neuralgia.  Initiate 0.5 mg twice daily.  Continue lamotrigine 200 mg daily.    Continue supportive psychotherapy efforts and medications as indicated. Treatment and medication options discussed during today's visit. Patient ackowledged and verbally consented to continue with current treatment plan and was educated on the importance of compliance with treatment and follow-up appointments. Patient seems reasonably able to adhere to treatment plan.      Medication Considerations:  Discussed medication options and treatment plan of prescribed medication(s) as well as the risks, benefits, and potential side effects. Patient is agreeable to call the office with any worsening of symptoms or onset of side effects. Patient is agreeable to call 911 or go to the nearest ER should he/she begin having SI/HI.    Quality Measures:   Never smoker    I advised Corrine Eaton of the risks of tobacco use.     Follow Up:   Return in about 1 month (around 2/22/2025) for Recheck.      SCHUYLER Hawkins, PMHNP-BC      *Part of this note may be an electronic transcription/translation of spoken language to printed text using the Dragon  Dictation System.

## 2025-02-05 ENCOUNTER — TELEPHONE (OUTPATIENT)
Dept: BEHAVIORAL HEALTH | Facility: CLINIC | Age: 35
End: 2025-02-05
Payer: COMMERCIAL

## 2025-02-05 NOTE — TELEPHONE ENCOUNTER
Pt reports she has an upcoming appt w/ neuro for her headaches and she thinks it likely her Tegretol dose will be increased. She reports there had been some concern in past w/ this RX affecting her depression symptoms. She wishes to discuss this prior to her neuro appt. Please advise

## 2025-02-06 NOTE — TELEPHONE ENCOUNTER
Called patient back regarding her concerns with her upcoming appointment with neurology.  Phone went to voicemail.  I left a voicemail and I will send her a message via my chart.

## 2025-02-11 DIAGNOSIS — F31.32 BIPOLAR AFFECTIVE DISORDER, CURRENTLY DEPRESSED, MODERATE: ICD-10-CM

## 2025-02-11 RX ORDER — ZIPRASIDONE HYDROCHLORIDE 20 MG/1
20 CAPSULE ORAL EVERY MORNING
Qty: 30 CAPSULE | Refills: 1 | OUTPATIENT
Start: 2025-02-11

## 2025-02-28 ENCOUNTER — OFFICE VISIT (OUTPATIENT)
Dept: BEHAVIORAL HEALTH | Facility: CLINIC | Age: 35
End: 2025-02-28
Payer: COMMERCIAL

## 2025-02-28 VITALS
WEIGHT: 214 LBS | BODY MASS INDEX: 36.54 KG/M2 | HEIGHT: 64 IN | HEART RATE: 94 BPM | SYSTOLIC BLOOD PRESSURE: 142 MMHG | DIASTOLIC BLOOD PRESSURE: 92 MMHG

## 2025-02-28 DIAGNOSIS — G50.0 TRIGEMINAL NEURALGIA: ICD-10-CM

## 2025-02-28 DIAGNOSIS — R51.9 CHRONIC IDIOPATHIC FACIAL PAIN: ICD-10-CM

## 2025-02-28 DIAGNOSIS — G89.29 CHRONIC IDIOPATHIC FACIAL PAIN: ICD-10-CM

## 2025-02-28 DIAGNOSIS — F31.77 BIPOLAR DISORDER, IN PARTIAL REMISSION, MOST RECENT EPISODE MIXED: Primary | ICD-10-CM

## 2025-02-28 RX ORDER — ZIPRASIDONE HYDROCHLORIDE 60 MG/1
60 CAPSULE ORAL NIGHTLY
Qty: 30 CAPSULE | Refills: 2 | Status: SHIPPED | OUTPATIENT
Start: 2025-02-28

## 2025-02-28 RX ORDER — CLONAZEPAM 1 MG/1
1 TABLET ORAL 2 TIMES DAILY
Qty: 60 TABLET | Refills: 1 | Status: SHIPPED | OUTPATIENT
Start: 2025-02-28

## 2025-02-28 RX ORDER — ZIPRASIDONE HYDROCHLORIDE 40 MG/1
40 CAPSULE ORAL EVERY MORNING
Qty: 30 CAPSULE | Refills: 2 | Status: SHIPPED | OUTPATIENT
Start: 2025-02-28

## 2025-02-28 RX ORDER — LAMOTRIGINE 200 MG/1
200 TABLET ORAL DAILY
Qty: 90 TABLET | Refills: 1 | Status: SHIPPED | OUTPATIENT
Start: 2025-02-28

## 2025-02-28 NOTE — PROGRESS NOTES
Office  Follow Up Visit      Patient Name: Corrine Eaton  : 1990   MRN: [id number]     Referring Provider: Rukhsana Hilliard DO    Chief Complaint: Medication follow-up    ICD-10-CM ICD-9-CM   1. Bipolar disorder, in partial remission, most recent episode mixed  F31.77 296.65   2. Chronic idiopathic facial pain  R51.9 784.0    G89.29 338.29   3. Trigeminal neuralgia  G50.0 350.1        History of Present Illness:   Corrine Eaton is a 34 y.o. female who is here today for follow up related to medication management of bipolar disorder, trigeminal neuralgia, and associated chronic pain.  Medication regimen includes lamotrigine 200 mg daily, clonazepam 0.5 mg twice daily, and ziprasidone 40 mg every morning and 60 mg nightly.    Patient reports it has been a difficult prior month.  She reports that her trigeminal neuralgia pain has been unmanageable and that she had missed many days of work.  She reports that she was let go of her job and is now on disability.  She reports that she has been referred to neurosurgery related to her continuous unmanaged trigeminal neuralgia.  She reports that even with all of the situational stress she has seen overall improvement in her mood and feels as if her bipolar depression has resolved.  She reports most of that her depressive symptoms at this time is likely situational because of what is happening over the last month.  Patient reports that the clonazepam has been somewhat helpful.  She reports it does decrease the pain some.  She reports that she feels most of the pain discomfort at night which has interfered with her sleep quality.      Subjective      Review of Systems:   Review of Systems   Constitutional:  Positive for fatigue.   Respiratory: Negative.     Cardiovascular: Negative.    Gastrointestinal: Negative.    Genitourinary: Negative.    Musculoskeletal: Negative.    Psychiatric/Behavioral:  Positive for sleep disturbance.        Patient History:  "  The following portions of the patient's history were reviewed and updated as appropriate: allergies, current medications, past family history, past medical history, past social history, past surgical history and problem list.     Social:  No change in interval history.    Medications:     Current Outpatient Medications:     clonazePAM (KlonoPIN) 1 MG tablet, Take 1 tablet by mouth 2 (Two) Times a Day., Disp: 60 tablet, Rfl: 1    lamoTRIgine (LaMICtal) 200 MG tablet, Take 1 tablet by mouth Daily., Disp: 90 tablet, Rfl: 1    ziprasidone (GEODON) 40 MG capsule, Take 1 capsule by mouth Every Morning., Disp: 30 capsule, Rfl: 2    ziprasidone (Geodon) 60 MG capsule, Take 1 capsule by mouth Every Night., Disp: 30 capsule, Rfl: 2    carBAMazepine (TEGretol) 200 MG tablet, Take 1 tablet by mouth 3 times a day., Disp: , Rfl:     Humira, 2 Pen, 40 MG/0.4ML Pen-injector Kit, INJECT 1 PEN UNDER THE SKIN EVERY 14 DAYS., Disp: 2 each, Rfl: 11    HYDROcodone-acetaminophen (Norco) 5-325 MG per tablet, Take 1 tablet by mouth Every 6 (Six) Hours As Needed for Moderate Pain., Disp: 10 tablet, Rfl: 0    methylPREDNISolone (MEDROL) 4 MG dose pack, Take as directed on package instructions., Disp: 21 tablet, Rfl: 0    OXcarbazepine (TRILEPTAL) 300 MG tablet, Take 1 tablet by mouth 3 times a day., Disp: , Rfl:     Objective     Physical Exam:  Vital Signs:   Vitals:    02/28/25 1523   BP: 142/92   Pulse: 94   Weight: 97.1 kg (214 lb)   Height: 162.6 cm (64\")     Body mass index is 36.73 kg/m².     Mental Status Exam:   Hygiene:   good  Cooperation:  Cooperative  Eye Contact:  Good  Psychomotor Behavior:  Appropriate  Affect:  Restricted  Mood: euthymic  Speech:  Normal  Thought Process:  Circum  Thought Content:  Mood congruent  Suicidal:  None  Homicidal:  None  Hallucinations:  None  Delusion:  None  Memory:  Intact  Orientation:  Grossly intact  Reliability:  good  Insight:  Good  Judgement:  Good  Impulse Control:  " Good  Physical/Medical Issues:  Yes several medical comorbidities, see chart      Assessment / Plan      Visit Diagnosis/Orders Placed This Visit:  Diagnoses and all orders for this visit:    1. Bipolar disorder, in partial remission, most recent episode mixed (Primary)  -     ziprasidone (Geodon) 60 MG capsule; Take 1 capsule by mouth Every Night.  Dispense: 30 capsule; Refill: 2  -     ziprasidone (GEODON) 40 MG capsule; Take 1 capsule by mouth Every Morning.  Dispense: 30 capsule; Refill: 2  -     lamoTRIgine (LaMICtal) 200 MG tablet; Take 1 tablet by mouth Daily.  Dispense: 90 tablet; Refill: 1    2. Chronic idiopathic facial pain  -     clonazePAM (KlonoPIN) 1 MG tablet; Take 1 tablet by mouth 2 (Two) Times a Day.  Dispense: 60 tablet; Refill: 1    3. Trigeminal neuralgia  -     clonazePAM (KlonoPIN) 1 MG tablet; Take 1 tablet by mouth 2 (Two) Times a Day.  Dispense: 60 tablet; Refill: 1         Differential:   N/A    Plan:   Continue lamotrigine and ziprasidone as ordered.  Increase clonazepam to 1 mg twice daily.    Continue supportive psychotherapy efforts and medications as indicated. Treatment and medication options discussed during today's visit. Patient ackowledged and verbally consented to continue with current treatment plan and was educated on the importance of compliance with treatment and follow-up appointments. Patient seems reasonably able to adhere to treatment plan.      Medication Considerations:  Discussed medication options and treatment plan of prescribed medication(s) as well as the risks, benefits, and potential side effects. Patient is agreeable to call the office with any worsening of symptoms or onset of side effects. Patient is agreeable to call 911 or go to the nearest ER should he/she begin having SI/HI.    Quality Measures:   Never smoker    I advised Corrine Eaton of the risks of tobacco use.     Follow Up:   Return in about 3 months (around 5/28/2025) for Recheck.      Justin  Scott, SCHUYLER, PMHNP-BC      *Part of this note may be an electronic transcription/translation of spoken language to printed text using the Dragon Dictation System.

## 2025-03-19 ENCOUNTER — OFFICE VISIT (OUTPATIENT)
Dept: NEUROSURGERY | Facility: CLINIC | Age: 35
End: 2025-03-19
Payer: COMMERCIAL

## 2025-03-19 VITALS — WEIGHT: 220 LBS | RESPIRATION RATE: 18 BRPM | TEMPERATURE: 97.6 F | HEIGHT: 64 IN | BODY MASS INDEX: 37.56 KG/M2

## 2025-03-19 DIAGNOSIS — G50.0 TRIGEMINAL NEURALGIA: Primary | ICD-10-CM

## 2025-03-19 PROCEDURE — 99203 OFFICE O/P NEW LOW 30 MIN: CPT | Performed by: NEUROLOGICAL SURGERY

## 2025-03-19 RX ORDER — PREGABALIN 100 MG/1
100 CAPSULE ORAL 3 TIMES DAILY
COMMUNITY

## 2025-03-19 NOTE — PROGRESS NOTES
Patient: Corrine Eaton  : 1990    Primary Care Provider: Rukhsana Hilliard DO    Requesting Provider: As above          History of Present Illness  The patient is a 34-year-old woman who presents for evaluation of right facial pain.    She has been experiencing persistent right facial pain for approximately 2 years, initially described as an electric shock sensation. The pain was initially localized to her forehead, eye, and ear but has since spread to her entire face, often extending into her jawline, teeth, and tongue. The pain is characterized by a constant tingling ache, which occasionally radiates to the back of her head. She also experiences episodes of intense pain, typically at night, which are often preceded by stressful days. 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 She also reports daily facial numbness, prickliness, and tingling. She has difficulty chewing and brushing her teeth due to the pain. She also reports cognitive difficulties, including slow processing speed and word-finding difficulties, which she attributes to her medication. Showers consistently exacerbate her symptoms, and exposure to wind invariably results in a headache. Any physical contact with her face, including pulling her hair up, triggers the pain. Talking makes the pain worse. Despite being treated for migraines by her primary care physician for a year, she found no relief and sought the expertise of Dr. Still. He also treated her for migraines using various modalities, but the pain persisted, leading to episodes of collapse due to its intensity. A trial of Botox provided temporary relief for 2 days. Approximately 8 months ago, she consulted with Dr. Wilson, who diagnosed her with trigeminal neuralgia and initiated treatment. Her current medication regimen includes Tegretol 200 mg 3 times daily and Lyrica 100 mg 3 times daily. Initially, these medications provided  relief, with Tegretol offering a 2-month pain-free period. However, the pain recurred in 12/2024, prompting an increase in her Lyrica dosage from 25 mg to 100 mg 3 times daily in 01/2025. An increase in her Tegretol dosage to 300 mg 3 times daily resulted in an unbalanced gait and frequent falls, leading to a reduction back to 200 mg 3 times daily.    She is also on Trileptal for bipolar disorder.    SOCIAL HISTORY  She is a teacher by profession.    MEDICATIONS  Current: Tegretol, Lyrica, Trileptal      Review of Systems   Constitutional:  Negative for activity change, appetite change, chills, diaphoresis, fatigue, fever and unexpected weight change.   HENT:  Negative for congestion, dental problem, drooling, ear discharge, ear pain, facial swelling, hearing loss, mouth sores, nosebleeds, postnasal drip, rhinorrhea, sinus pressure, sneezing, sore throat, tinnitus, trouble swallowing and voice change.         Right sided facial pain     Eyes:  Negative for photophobia, pain, discharge, redness, itching and visual disturbance.   Respiratory:  Negative for apnea, cough, choking, chest tightness, shortness of breath, wheezing and stridor.    Cardiovascular:  Negative for chest pain, palpitations and leg swelling.   Gastrointestinal:  Negative for abdominal distention, abdominal pain, anal bleeding, blood in stool, constipation, diarrhea, nausea, rectal pain and vomiting.   Endocrine: Negative for cold intolerance, heat intolerance, polydipsia, polyphagia and polyuria.   Genitourinary:  Negative for decreased urine volume, difficulty urinating, dysuria, enuresis, flank pain, frequency, genital sores, hematuria and urgency.   Musculoskeletal:  Negative for arthralgias, back pain, gait problem, joint swelling, myalgias, neck pain and neck stiffness.   Skin:  Negative for color change, pallor, rash and wound.   Allergic/Immunologic: Negative for environmental allergies, food allergies and immunocompromised state.    Neurological:  Negative for dizziness, tremors, seizures, syncope, facial asymmetry, speech difficulty, weakness, light-headedness, numbness and headaches.   Hematological:  Negative for adenopathy. Does not bruise/bleed easily.   Psychiatric/Behavioral:  Negative for agitation, behavioral problems, confusion, decreased concentration, dysphoric mood, hallucinations, self-injury, sleep disturbance and suicidal ideas. The patient is not nervous/anxious and is not hyperactive.    All other systems reviewed and are negative.      The patient's past medical history, past surgical history, family history, and social history have been reviewed at length in the electronic medical record.      Physical Exam:   CONSTITUTIONAL: Patient is well-nourished, pleasant and appears stated age.  MUSCULOSKELETAL:  Neck tenderness to palpation is not observed.   ROM in the neck is normal.  NEUROLOGICAL:  Orientation, memory, attention span, language function, and cognition have been examined and are intact.  Strength is intact in the upper and lower extremities to direct testing.  Muscle tone is normal throughout.  Station and gait are normal.  Sensation is intact to light touch testing throughout.  Deep tendon reflexes are 1+ and symmetrical.  Gatito's Sign is negative bilaterally. No clonus is elicited.  Coordination is intact.  CRANIAL NERVES:  Cranial Nerve II: Visual fields are full to confrontation.  Cranial Nerve III, IV, and VI: PERRLADC. Extraocular movements are intact.  Nystagmus is not present.  Cranial Nerve V: Facial sensation is intact to light touch.  She is hypersensitive to touch in the right face.  Cranial Nerve VII: Muscles of facial expression demonstate no weakness or asymmetry.  Cranial Nerve VIII: Hearing is intact to finger rub bilaterally.  Cranial Nerve IX and X: Palate elevates symmetrically.  Cranial Nerve XI: Shoulder shrug is intact bilaterally.  Cranial Nerve XII: Tongue is midline without evidence of  atrophy or fasciculation.    Medical Decision Making    Data Review:   (All imaging studies were personally reviewed unless stated otherwise)  Results  MRA of the brain dated 4/2/2024 demonstrates an area of filling defect in the left transverse sinus.  The significance of this is unknown.  This is an incidental finding and would not likely be related to her right facial pain.      Diagnosis:   Right trigeminal neuralgia.    Treatment Options:   The patient has ongoing pain despite medication.  She has had some side effects due to the medication as well.  I am in to check an MRI of the brain with and without gadolinium with CyberKnife sequences.  She will follow-up thereafter.  Further recommendations will ensue.    Patient or patient representative verbalized consent for the use of Ambient Listening during the visit with  Paul Caraballo MD for chart documentation. 3/19/2025  10:27 EDT      Scribed for Paul Caraballo MD by Jessica Anderson CMA on 3/19/2025 10:40 EDT     I, Dr. Caraballo, personally performed the services described in the documentation, as scribed in my presence, and it is both accurate and complete.

## 2025-04-01 ENCOUNTER — HOSPITAL ENCOUNTER (OUTPATIENT)
Dept: MRI IMAGING | Facility: HOSPITAL | Age: 35
Discharge: HOME | End: 2025-04-01
Admitting: NEUROLOGICAL SURGERY
Payer: COMMERCIAL

## 2025-04-01 DIAGNOSIS — G50.0 TRIGEMINAL NEURALGIA: ICD-10-CM

## 2025-04-01 PROCEDURE — 70553 MRI BRAIN STEM W/O & W/DYE: CPT

## 2025-04-01 PROCEDURE — 25510000002 GADOBENATE DIMEGLUMINE 529 MG/ML SOLUTION: Performed by: NEUROLOGICAL SURGERY

## 2025-04-01 PROCEDURE — A9577 INJ MULTIHANCE: HCPCS | Performed by: NEUROLOGICAL SURGERY

## 2025-04-01 RX ADMIN — GADOBENATE DIMEGLUMINE 20 ML: 529 INJECTION, SOLUTION INTRAVENOUS at 19:51

## 2025-04-02 ENCOUNTER — OFFICE VISIT (OUTPATIENT)
Dept: NEUROSURGERY | Facility: CLINIC | Age: 35
End: 2025-04-02
Payer: COMMERCIAL

## 2025-04-02 ENCOUNTER — PREP FOR SURGERY (OUTPATIENT)
Dept: OTHER | Facility: HOSPITAL | Age: 35
End: 2025-04-02
Payer: COMMERCIAL

## 2025-04-02 VITALS — HEIGHT: 64 IN | WEIGHT: 224 LBS | TEMPERATURE: 98 F | BODY MASS INDEX: 38.24 KG/M2

## 2025-04-02 DIAGNOSIS — G50.0 TRIGEMINAL NEURALGIA OF RIGHT SIDE OF FACE: Primary | ICD-10-CM

## 2025-04-02 DIAGNOSIS — G50.0 TRIGEMINAL NEURALGIA: Primary | ICD-10-CM

## 2025-04-02 PROCEDURE — 99214 OFFICE O/P EST MOD 30 MIN: CPT | Performed by: NEUROLOGICAL SURGERY

## 2025-04-02 RX ORDER — DEXAMETHASONE SODIUM PHOSPHATE 10 MG/ML
10 INJECTION, SOLUTION INTRAMUSCULAR; INTRAVENOUS
OUTPATIENT
Start: 2025-04-02

## 2025-04-02 RX ORDER — FAMOTIDINE 20 MG/1
20 TABLET, FILM COATED ORAL
OUTPATIENT
Start: 2025-04-02

## 2025-04-02 RX ORDER — CHLORHEXIDINE GLUCONATE 40 MG/ML
SOLUTION TOPICAL
Qty: 120 ML | Refills: 0 | Status: SHIPPED | OUTPATIENT
Start: 2025-04-02

## 2025-04-02 NOTE — PROGRESS NOTES
Patient: Corrine Eaton  : 1990    Primary Care Provider: Rukhsana Hilliard DO    Requesting Provider: As above        History    Chief Complaint: Right facial pain.    History of Present Illness: Ms. Eaton has been experiencing persistent right facial pain for approximately 2 years, initially described as an electric shock sensation. The pain was initially localized to her forehead, eye, and ear but has since spread to her entire face, often extending into her jawline, teeth, and tongue. The pain is characterized by a constant tingling ache, which occasionally radiates to the back of her head. She also experiences episodes of intense pain, typically at night, which are often preceded by stressful days. 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 She also reports daily facial numbness, prickliness, and tingling. She has difficulty chewing and brushing her teeth due to the pain. She also reports cognitive difficulties, including slow processing speed and word-finding difficulties, which she attributes to her medication. Showers consistently exacerbate her symptoms, and exposure to wind invariably results in a headache. Any physical contact with her face, including pulling her hair up, triggers the pain. Talking makes the pain worse. Despite being treated for migraines by her primary care physician for a year, she found no relief and sought the expertise of Dr. Still. He also treated her for migraines using various modalities, but the pain persisted, leading to episodes of collapse due to its intensity. A trial of Botox provided temporary relief for 2 days. Approximately 8 months ago, she consulted with Dr. Wilson, who diagnosed her with trigeminal neuralgia and initiated treatment. Her current medication regimen includes Tegretol 200 mg 3 times daily and Lyrica 100 mg 3 times daily. Initially, these medications provided relief, with Tegretol offering a  2-month pain-free period. However, the pain recurred in 12/2024, prompting an increase in her Lyrica dosage from 25 mg to 100 mg 3 times daily in 01/2025. An increase in her Tegretol dosage to 300 mg 3 times daily resulted in an unbalanced gait and frequent falls, leading to a reduction back to 200 mg 3 times daily.  She reports no change in her symptoms.  She is largely incapacitated because of the pain and the side effects related to her medicines.    Review of Systems   Constitutional:  Negative for activity change, appetite change, chills, diaphoresis, fatigue, fever and unexpected weight change.   HENT:  Negative for congestion, dental problem, drooling, ear discharge, ear pain, facial swelling, hearing loss, mouth sores, nosebleeds, postnasal drip, rhinorrhea, sinus pressure, sinus pain, sneezing, sore throat, tinnitus, trouble swallowing and voice change.    Eyes:  Negative for photophobia, pain, discharge, redness, itching and visual disturbance.   Respiratory:  Negative for apnea, cough, choking, chest tightness, shortness of breath, wheezing and stridor.    Cardiovascular:  Negative for chest pain, palpitations and leg swelling.   Gastrointestinal:  Negative for abdominal distention, abdominal pain, anal bleeding, blood in stool, constipation, diarrhea, nausea, rectal pain and vomiting.   Endocrine: Negative for cold intolerance, heat intolerance, polydipsia, polyphagia and polyuria.   Genitourinary:  Negative for decreased urine volume, difficulty urinating, dyspareunia, dysuria, enuresis, flank pain, frequency, genital sores, hematuria, menstrual problem, pelvic pain, urgency, vaginal bleeding, vaginal discharge and vaginal pain.   Musculoskeletal:  Negative for arthralgias, back pain, gait problem, joint swelling, myalgias, neck pain and neck stiffness.   Skin:  Negative for color change, pallor, rash and wound.   Allergic/Immunologic: Negative for environmental allergies, food allergies and  "immunocompromised state.   Neurological:  Positive for dizziness and headaches. Negative for tremors, seizures, syncope, facial asymmetry, speech difficulty, weakness, light-headedness and numbness.   Hematological:  Negative for adenopathy. Does not bruise/bleed easily.   Psychiatric/Behavioral:  Negative for agitation, behavioral problems, confusion, decreased concentration, dysphoric mood, hallucinations, self-injury, sleep disturbance and suicidal ideas. The patient is not nervous/anxious and is not hyperactive.      The patient's past medical history, past surgical history, family history, and social history have been reviewed at length in the electronic medical record.      Physical Exam:   Ht 162.6 cm (64\")   Wt 102 kg (224 lb)   BMI 38.45 kg/m²   Deferred    Medical Decision Making    Data Review:   (All imaging studies were personally reviewed unless stated otherwise)  MRI of the brain with and without gadolinium demonstrates a small blood vessel in the region of the right trigeminal nerve within the within the cistern.  This certainly could be an offending blood vessel.  I do not see any enhancing lesions to suggest a neoplastic process.    Diagnosis:   Intractable right trigeminal neuralgia.    Treatment Options:   I discussed treatment options with the patient and her  including microvascular decompression or CyberKnife radiosurgery.  Given her age I would recommend microvascular decompression.  I have also offered to refer her to another center that would do para cutaneous procedures but she is less enthusiastic about that.  Ultimately we have elected to pursue right trigeminal microvascular decompression.  Risks are said to include but not be limited to infection, CSF leak, cranial nerve compromise, stroke, and/or death.  It is also possible that she does not improve with the surgical intervention or that her symptoms recur.  I cannot guarantee that we will get her entirely off of her current " medication regimen.  The patient and her  wish to proceed with the surgical intervention.          I, Dr. Caraballo, personally performed the services described in the documentation, as scribed in my presence, and it is both accurate and complete.

## 2025-04-02 NOTE — H&P
Patient: Corrine Eaton  : 1990     Primary Care Provider: Rukhsana Hilliard DO     Requesting Provider: As above           History     Chief Complaint: Right facial pain.     History of Present Illness: Ms. Eaton has been experiencing persistent right facial pain for approximately 2 years, initially described as an electric shock sensation. The pain was initially localized to her forehead, eye, and ear but has since spread to her entire face, often extending into her jawline, teeth, and tongue. The pain is characterized by a constant tingling ache, which occasionally radiates to the back of her head. She also experiences episodes of intense pain, typically at night, which are often preceded by stressful days. 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 She also reports daily facial numbness, prickliness, and tingling. She has difficulty chewing and brushing her teeth due to the pain. She also reports cognitive difficulties, including slow processing speed and word-finding difficulties, which she attributes to her medication. Showers consistently exacerbate her symptoms, and exposure to wind invariably results in a headache. Any physical contact with her face, including pulling her hair up, triggers the pain. Talking makes the pain worse. Despite being treated for migraines by her primary care physician for a year, she found no relief and sought the expertise of Dr. Stlil. He also treated her for migraines using various modalities, but the pain persisted, leading to episodes of collapse due to its intensity. A trial of Botox provided temporary relief for 2 days. Approximately 8 months ago, she consulted with Dr. Wilson, who diagnosed her with trigeminal neuralgia and initiated treatment. Her current medication regimen includes Tegretol 200 mg 3 times daily and Lyrica 100 mg 3 times daily. Initially, these medications provided relief, with Tegretol  offering a 2-month pain-free period. However, the pain recurred in 12/2024, prompting an increase in her Lyrica dosage from 25 mg to 100 mg 3 times daily in 01/2025. An increase in her Tegretol dosage to 300 mg 3 times daily resulted in an unbalanced gait and frequent falls, leading to a reduction back to 200 mg 3 times daily.  She reports no change in her symptoms.  She is largely incapacitated because of the pain and the side effects related to her medicines.     Review of Systems   Constitutional:  Negative for activity change, appetite change, chills, diaphoresis, fatigue, fever and unexpected weight change.   HENT:  Negative for congestion, dental problem, drooling, ear discharge, ear pain, facial swelling, hearing loss, mouth sores, nosebleeds, postnasal drip, rhinorrhea, sinus pressure, sinus pain, sneezing, sore throat, tinnitus, trouble swallowing and voice change.    Eyes:  Negative for photophobia, pain, discharge, redness, itching and visual disturbance.   Respiratory:  Negative for apnea, cough, choking, chest tightness, shortness of breath, wheezing and stridor.    Cardiovascular:  Negative for chest pain, palpitations and leg swelling.   Gastrointestinal:  Negative for abdominal distention, abdominal pain, anal bleeding, blood in stool, constipation, diarrhea, nausea, rectal pain and vomiting.   Endocrine: Negative for cold intolerance, heat intolerance, polydipsia, polyphagia and polyuria.   Genitourinary:  Negative for decreased urine volume, difficulty urinating, dyspareunia, dysuria, enuresis, flank pain, frequency, genital sores, hematuria, menstrual problem, pelvic pain, urgency, vaginal bleeding, vaginal discharge and vaginal pain.   Musculoskeletal:  Negative for arthralgias, back pain, gait problem, joint swelling, myalgias, neck pain and neck stiffness.   Skin:  Negative for color change, pallor, rash and wound.   Allergic/Immunologic: Negative for environmental allergies, food allergies and  immunocompromised state.   Neurological:  Positive for dizziness and headaches. Negative for tremors, seizures, syncope, facial asymmetry, speech difficulty, weakness, light-headedness and numbness.   Hematological:  Negative for adenopathy. Does not bruise/bleed easily.   Psychiatric/Behavioral:  Negative for agitation, behavioral problems, confusion, decreased concentration, dysphoric mood, hallucinations, self-injury, sleep disturbance and suicidal ideas. The patient is not nervous/anxious and is not hyperactive.       The patient's past medical history, past surgical history, family history, and social history have been reviewed at length in the electronic medical record.     Past Medical History:   Diagnosis Date    ADHD (attention deficit hyperactivity disorder)     Anxiety     Arthritis     Bipolar disorder     Cholelithiasis 11/2015    Chronic pain disorder     Claustrophobia     Colon polyp     Crohn's disease 09/2010    Depression     GERD (gastroesophageal reflux disease)     Headache     Inflammatory bowel disease     Irritable bowel syndrome 09/2010    Lactose intolerance Always    Low back pain     Panic disorder     Peptic ulceration 9/2021    Self-injurious behavior     Thoracic disc disorder Bulging disc     Past Surgical History:   Procedure Laterality Date    ABDOMINAL SURGERY  09/2010    APPENDECTOMY      CHOLECYSTECTOMY      COLON SURGERY      COLONOSCOPY      ENDOMETRIAL ABLATION      EYE SURGERY  01/25    Laser    TUBAL ABDOMINAL LIGATION  10/2022     Family History   Problem Relation Age of Onset    Hyperlipidemia Mother     Hypertension Mother     Anxiety disorder Father     Hyperlipidemia Father     ADD / ADHD Father     Depression Father     Hypertension Father     Arthritis Maternal Grandmother     Depression Maternal Grandmother     Mental illness Maternal Grandmother     Bipolar disorder Maternal Grandmother     Paranoid behavior Maternal Grandmother     Alcohol abuse Maternal Aunt      Bipolar disorder Maternal Aunt     Colon cancer Neg Hx     Colon polyps Neg Hx     Esophageal cancer Neg Hx      Social History     Socioeconomic History    Marital status:    Tobacco Use    Smoking status: Never     Passive exposure: Never    Smokeless tobacco: Never   Vaping Use    Vaping status: Never Used   Substance and Sexual Activity    Alcohol use: Yes     Alcohol/week: 5.0 standard drinks of alcohol     Types: 5 Glasses of wine per week    Drug use: Never    Sexual activity: Yes     Partners: Male     Birth control/protection: Tubal ligation, Surgical           Allergies   Allergen Reactions    Imitrex [Sumatriptan] Shortness Of Breath and Headache     Made migraine worse       Current Outpatient Medications on File Prior to Visit   Medication Sig Dispense Refill    carBAMazepine (TEGretol) 200 MG tablet Take 1 tablet by mouth 3 times a day.      clonazePAM (KlonoPIN) 1 MG tablet Take 1 tablet by mouth 2 (Two) Times a Day. 60 tablet 1    Humira, 2 Pen, 40 MG/0.4ML Pen-injector Kit INJECT 1 PEN UNDER THE SKIN EVERY 14 DAYS. 2 each 11    HYDROcodone-acetaminophen (Norco) 5-325 MG per tablet Take 1 tablet by mouth Every 6 (Six) Hours As Needed for Moderate Pain. 10 tablet 0    lamoTRIgine (LaMICtal) 200 MG tablet Take 1 tablet by mouth Daily. 90 tablet 1    OXcarbazepine (TRILEPTAL) 300 MG tablet Take 1 tablet by mouth 3 times a day.      pregabalin (LYRICA) 100 MG capsule Take 1 capsule by mouth 3 times a day.      ziprasidone (GEODON) 40 MG capsule Take 1 capsule by mouth Every Morning. 30 capsule 2    ziprasidone (Geodon) 60 MG capsule Take 1 capsule by mouth Every Night. 30 capsule 2     Current Facility-Administered Medications on File Prior to Visit   Medication Dose Route Frequency Provider Last Rate Last Admin    [COMPLETED] gadobenate dimeglumine (MULTIHANCE) injection 20 mL  20 mL Intravenous Once in imaging Paul Caraballo MD   20 mL at 04/01/25 1951          Physical Exam:   Ht 162.6 cm  "(64\")   Wt 102 kg (224 lb)   BMI 38.45 kg/m²   Deferred     Medical Decision Making     Data Review:   (All imaging studies were personally reviewed unless stated otherwise)  MRI of the brain with and without gadolinium demonstrates a small blood vessel in the region of the right trigeminal nerve within the within the cistern.  This certainly could be an offending blood vessel.  I do not see any enhancing lesions to suggest a neoplastic process.     Diagnosis:   Intractable right trigeminal neuralgia.     Treatment Options:   I discussed treatment options with the patient and her  including microvascular decompression or CyberKnife radiosurgery.  Given her age I would recommend microvascular decompression.  I have also offered to refer her to another center that would do para cutaneous procedures but she is less enthusiastic about that.  Ultimately we have elected to pursue right trigeminal microvascular decompression.  Risks are said to include but not be limited to infection, CSF leak, cranial nerve compromise, stroke, and/or death.  It is also possible that she does not improve with the surgical intervention or that her symptoms recur.  I cannot guarantee that we will get her entirely off of her current medication regimen.  The patient and her  wish to proceed with the surgical intervention.  She will need to hold her Humira at least 2 weeks preceding the surgery.        "

## 2025-04-14 ENCOUNTER — OFFICE VISIT (OUTPATIENT)
Dept: GASTROENTEROLOGY | Facility: CLINIC | Age: 35
End: 2025-04-14
Payer: COMMERCIAL

## 2025-04-14 VITALS
WEIGHT: 228 LBS | OXYGEN SATURATION: 98 % | HEART RATE: 72 BPM | DIASTOLIC BLOOD PRESSURE: 84 MMHG | BODY MASS INDEX: 38.93 KG/M2 | TEMPERATURE: 98.6 F | HEIGHT: 64 IN | SYSTOLIC BLOOD PRESSURE: 128 MMHG | RESPIRATION RATE: 16 BRPM

## 2025-04-14 DIAGNOSIS — K50.90 CROHN'S DISEASE WITHOUT COMPLICATION, UNSPECIFIED GASTROINTESTINAL TRACT LOCATION: Primary | ICD-10-CM

## 2025-04-14 PROCEDURE — 99214 OFFICE O/P EST MOD 30 MIN: CPT | Performed by: INTERNAL MEDICINE

## 2025-04-14 NOTE — PROGRESS NOTES
PCP: Rukhsana Hilliard DO    No chief complaint on file.      History of Present Illness:   Corrine Eaton is a 34 y.o. female who presents to GI clinic as a follow up for crohn's and IBS. Last seen 9/6/23. She has been dealing with headaches/facial pain due to trigeminal neuralgia. Recent neurosurgical consultation for consideration of microscopic decompression. Doing well having 1 formed bm a day. Not requiring bentyl.     Past Medical History:   Diagnosis Date    ADHD (attention deficit hyperactivity disorder)     Anxiety     Arthritis     Bipolar disorder     Cholelithiasis 11/2015    Chronic pain disorder     Claustrophobia     Colon polyp     Crohn's disease 09/2010    Depression     GERD (gastroesophageal reflux disease)     Headache     Inflammatory bowel disease     Irritable bowel syndrome 09/2010    Lactose intolerance Always    Low back pain     Panic disorder     Peptic ulceration 9/2021    Self-injurious behavior     Thoracic disc disorder Bulging disc       Past Surgical History:   Procedure Laterality Date    ABDOMINAL SURGERY  09/2010    APPENDECTOMY      CHOLECYSTECTOMY      COLON SURGERY      COLONOSCOPY      ENDOMETRIAL ABLATION      EYE SURGERY  01/25    Laser    TUBAL ABDOMINAL LIGATION  10/2022         Current Outpatient Medications:     carBAMazepine (TEGretol) 200 MG tablet, Take 1 tablet by mouth 3 times a day., Disp: , Rfl:     Chlorhexidine Gluconate 4 % solution, Shower each day with solution for 5 days beginning 5 days before surgery., Disp: 120 mL, Rfl: 0    clonazePAM (KlonoPIN) 1 MG tablet, Take 1 tablet by mouth 2 (Two) Times a Day., Disp: 60 tablet, Rfl: 1    Humira, 2 Pen, 40 MG/0.4ML Pen-injector Kit, INJECT 1 PEN UNDER THE SKIN EVERY 14 DAYS., Disp: 2 each, Rfl: 11    HYDROcodone-acetaminophen (Norco) 5-325 MG per tablet, Take 1 tablet by mouth Every 6 (Six) Hours As Needed for Moderate Pain., Disp: 10 tablet, Rfl: 0    lamoTRIgine (LaMICtal) 200 MG tablet, Take 1 tablet by  mouth Daily., Disp: 90 tablet, Rfl: 1    mupirocin (BACTROBAN) 2 % nasal ointment, Apply to the inside of each nostril with a cotton swab two times daily, morning and evening, for 5 days before surgery., Disp: 10 each, Rfl: 0    OXcarbazepine (TRILEPTAL) 300 MG tablet, Take 1 tablet by mouth 3 times a day., Disp: , Rfl:     pregabalin (LYRICA) 100 MG capsule, Take 1 capsule by mouth 3 times a day., Disp: , Rfl:     ziprasidone (GEODON) 40 MG capsule, Take 1 capsule by mouth Every Morning., Disp: 30 capsule, Rfl: 2    ziprasidone (Geodon) 60 MG capsule, Take 1 capsule by mouth Every Night., Disp: 30 capsule, Rfl: 2    Allergies   Allergen Reactions    Imitrex [Sumatriptan] Shortness Of Breath and Headache     Made migraine worse       Family History   Problem Relation Age of Onset    Hyperlipidemia Mother     Hypertension Mother     Anxiety disorder Father     Hyperlipidemia Father     ADD / ADHD Father     Depression Father     Hypertension Father     Arthritis Maternal Grandmother     Depression Maternal Grandmother     Mental illness Maternal Grandmother     Bipolar disorder Maternal Grandmother     Paranoid behavior Maternal Grandmother     Alcohol abuse Maternal Aunt     Bipolar disorder Maternal Aunt     Colon cancer Neg Hx     Colon polyps Neg Hx     Esophageal cancer Neg Hx        Social History     Socioeconomic History    Marital status:    Tobacco Use    Smoking status: Never     Passive exposure: Never    Smokeless tobacco: Never   Vaping Use    Vaping status: Never Used   Substance and Sexual Activity    Alcohol use: Yes     Alcohol/week: 5.0 standard drinks of alcohol     Types: 5 Glasses of wine per week    Drug use: Never    Sexual activity: Yes     Partners: Male     Birth control/protection: Tubal ligation, Surgical       Review of Systems  A complete 12 point ros was asked and is negative except for that mentioned above.  In particular:  No fever  No rash  No increased arthralgias  No  worsening edema  No cough  No dyspnea  No chest pain      There were no vitals filed for this visit.    Physical Exam  General: well developed, well nourished  A+O x 3 NAD  HEENT: NCAT, pupils equal appearing, sclera appear white  NECK: full ROM  Respiratory: symmetric chest rise, normal effort, normal work of breathing, no overt rales  Abdomen: non-distended  Skin: normal color, no jaundice  Neuro: no tremor, no facial droop  Psych: normal mood and affect      Assessment/Plan  1.) Crohn's disease  Workup: colonoscopy 9/2021 with 6 apthous ulcers, MRE normal 2021 without small bowel disease  - last colonoscopy: 9/2023: normal oriana TI, fair prep, no ulceration (history of apthous ulcerations)  Cbc, cmp crp: wnl.  H/o surgical extraction of suspected inflammatory crohn's.  No nsaid use  - suspect crohn's disease and ibs- low fodmap diet helped crampy pain but she had more liquid bms  - has periods of no bm on regular diet  - workup: (-) humira ab, Drug level: 7.3  CTE 6/2023: no small bowel stricture    Plan:   - she has diffuse functional disease and a h/o overt inflammatory disease  - cbc, cmp crp  - humira level and ab  - ok to hold humira 2 weeks prior and 2 weeks post neurosurgical intervention.        2.) Medication management  Cbc, cmp     3.) Immunosuppression  Tb test normal 2022  Cbc     4.) Chronic abdominal pain and diarrhea, suspect component of IBS-D and crohn's disease  Optimize sleep, hydration/diet, exercise and stressors  Prn bentyl     5.) History of Anal pain with defecation  Prn Bid sitz baths,    6.) Trigeminal neuralgia  Per neurology and neurosurgery         Santy Estrella MD  4/14/2025

## 2025-04-24 ENCOUNTER — PRE-ADMISSION TESTING (OUTPATIENT)
Dept: PREADMISSION TESTING | Facility: HOSPITAL | Age: 35
End: 2025-04-24
Payer: COMMERCIAL

## 2025-04-24 ENCOUNTER — TELEPHONE (OUTPATIENT)
Dept: NEUROSURGERY | Facility: CLINIC | Age: 35
End: 2025-04-24
Payer: COMMERCIAL

## 2025-04-24 DIAGNOSIS — G50.0 TRIGEMINAL NEURALGIA: ICD-10-CM

## 2025-04-24 LAB
ANION GAP SERPL CALCULATED.3IONS-SCNC: 11 MMOL/L (ref 5–15)
BUN SERPL-MCNC: 8 MG/DL (ref 6–20)
BUN/CREAT SERPL: 9.8 (ref 7–25)
CALCIUM SPEC-SCNC: 9.2 MG/DL (ref 8.6–10.5)
CHLORIDE SERPL-SCNC: 105 MMOL/L (ref 98–107)
CO2 SERPL-SCNC: 23 MMOL/L (ref 22–29)
CREAT SERPL-MCNC: 0.82 MG/DL (ref 0.57–1)
DEPRECATED RDW RBC AUTO: 38.4 FL (ref 37–54)
EGFRCR SERPLBLD CKD-EPI 2021: 95.8 ML/MIN/1.73
ERYTHROCYTE [DISTWIDTH] IN BLOOD BY AUTOMATED COUNT: 11.7 % (ref 12.3–15.4)
GLUCOSE SERPL-MCNC: 95 MG/DL (ref 65–99)
HBA1C MFR BLD: 5 % (ref 4.8–5.6)
HCT VFR BLD AUTO: 42.4 % (ref 34–46.6)
HGB BLD-MCNC: 14 G/DL (ref 12–15.9)
MCH RBC QN AUTO: 29.7 PG (ref 26.6–33)
MCHC RBC AUTO-ENTMCNC: 33 G/DL (ref 31.5–35.7)
MCV RBC AUTO: 89.8 FL (ref 79–97)
PLATELET # BLD AUTO: 267 10*3/MM3 (ref 140–450)
PMV BLD AUTO: 9.1 FL (ref 6–12)
POTASSIUM SERPL-SCNC: 4.5 MMOL/L (ref 3.5–5.2)
QT INTERVAL: 384 MS
QTC INTERVAL: 428 MS
RBC # BLD AUTO: 4.72 10*6/MM3 (ref 3.77–5.28)
SODIUM SERPL-SCNC: 139 MMOL/L (ref 136–145)
WBC NRBC COR # BLD AUTO: 7.2 10*3/MM3 (ref 3.4–10.8)

## 2025-04-24 PROCEDURE — 80048 BASIC METABOLIC PNL TOTAL CA: CPT

## 2025-04-24 PROCEDURE — 36415 COLL VENOUS BLD VENIPUNCTURE: CPT

## 2025-04-24 PROCEDURE — 83036 HEMOGLOBIN GLYCOSYLATED A1C: CPT

## 2025-04-24 PROCEDURE — 85027 COMPLETE CBC AUTOMATED: CPT

## 2025-04-24 PROCEDURE — 87081 CULTURE SCREEN ONLY: CPT

## 2025-04-24 PROCEDURE — 93005 ELECTROCARDIOGRAM TRACING: CPT

## 2025-04-24 RX ORDER — PREGABALIN 150 MG/1
1 CAPSULE ORAL 3 TIMES DAILY
COMMUNITY
Start: 2025-04-03

## 2025-04-24 RX ORDER — TRAMADOL HYDROCHLORIDE 50 MG/1
1 TABLET ORAL 3 TIMES DAILY
COMMUNITY
Start: 2025-04-03

## 2025-04-24 RX ORDER — SULFAMETHOXAZOLE AND TRIMETHOPRIM 800; 160 MG/1; MG/1
1 TABLET ORAL EVERY 12 HOURS
COMMUNITY
Start: 2025-04-23 | End: 2025-05-01

## 2025-04-24 NOTE — TELEPHONE ENCOUNTER
Patient is scheduled for microvascular decompression of the right trigeminal nerve for neuralgia. She may proceed with surgery if she has completed her medication and has no further symptoms. Please have her update us if this is not the case.

## 2025-04-24 NOTE — TELEPHONE ENCOUNTER
Provider:  Ilya  Surgery/Procedure:  Upcoming - craniotomy  Surgery/Procedure Date:  5/1/25  Last visit:   4/2/25  Next visit: NA     Reason for call:  Pre admission testing called to update us that Ms. Eaton started Bactrim yesterday for a UTI. She is on a 5 day dose, should have her last dose on 4/27/25.

## 2025-04-24 NOTE — PAT
An arrival time for procedure was not provided during PAT visit. If patient had any questions or concerns about their arrival time, they were instructed to contact their surgeon/physician.  Additionally, if the patient referred to an arrival time that was acquired from their my chart account, patient was encouraged to verify that time with their surgeon/physician. Arrival times are NOT provided in Pre Admission Testing Department.    Patient viewed general PAT education video as instructed in their preoperative information received from their surgeon.  Patient stated the general PAT education video was viewed in its entirety and survey completed.  Copies of PAT general education handouts (Incentive Spirometry, Meds to Beds Program, Patient Belongings, Pre-op skin preparation instructions, Blood Glucose testing, Visitor policy, Surgery FAQ, Code H) distributed to patient if not printed. Education related to the PAT pass and skin preparation for surgery (if applicable) completed in PAT as a reinforcement to PAT education video. Patient instructed to return PAT pass provided today as well as completed skin preparation sheet (if applicable) on the day of procedure.     Additionally if patient had not viewed video yet but intended to view it at home or in our waiting area, then referred them to the handout with QR code/link provided during PAT visit.  Encouraged patient/family to read PAT general education handouts thoroughly and notify PAT staff with any questions or concerns. Patient verbalized understanding of all information and priority content.    Patient denies any current skin issues.     Bactroban (if prescribed) and Chlorhexidine Prescription prescribed by physician before PAT visit.  Verified with patient that medication(s) were picked up from their pharmacy.  Written instructions given to patient during PAT visit.  Patient/family also instructed to complete skin prep checklist and return the checklist on the  day of surgery to preoperative staff.  Patient/family verbalized understanding.    Patient to apply Chlorhexadine wipes  to surgical area (as instructed) the night before procedure and the AM of procedure. Wipes provided.    It was noted during Pre Admission Testing that patient was wearing some form of fingernail polish (gel/regular) and/or acrylic/artificial nails.  Patient was told that polish and/or artificial nails must be removed for surgery.  If a patient had recent manicure, and would rather not remove polish or artificial nails. Then the minimum requirement is that the polish/artificial nails must be removed from the middle finger on each hand.  Patient verbalized understanding.    If patient was having surgery on an upper extremity, then the patient was instructed that fingernail polish/artificial fingernails must be removed for surgery.  NO EXCEPTIONS.  Patient verbalized understanding.    If patient was having surgery on a lower extremity, then the patient was instructed that toenail polish on both extremities must be removed for surgery.  NO EXCEPTIONS. Patient verbalized understanding.    EKG IN CHART    MARIAELENA FERRER WITH DR. MUSTAFA'S OFFICE NOTIFIED OF CURRENT UTI ON BACTRIM 5 DAY COURSE STARTED ON 4/23/2025, TO BE COMPLETED ON 4/28/2025.

## 2025-04-24 NOTE — TELEPHONE ENCOUNTER
----- Message from Yvan Still MD sent at 4/3/2024 10:27 AM EDT -----  Can you check to see if pt has scheduled her LP    home

## 2025-04-25 LAB — MRSA SPEC QL CULT: NORMAL

## 2025-04-30 ENCOUNTER — ANESTHESIA EVENT (OUTPATIENT)
Dept: PERIOP | Facility: HOSPITAL | Age: 35
End: 2025-04-30
Payer: COMMERCIAL

## 2025-04-30 RX ORDER — SODIUM CHLORIDE 0.9 % (FLUSH) 0.9 %
10 SYRINGE (ML) INJECTION AS NEEDED
Status: CANCELLED | OUTPATIENT
Start: 2025-04-30

## 2025-04-30 RX ORDER — SODIUM CHLORIDE 0.9 % (FLUSH) 0.9 %
10 SYRINGE (ML) INJECTION EVERY 12 HOURS SCHEDULED
Status: CANCELLED | OUTPATIENT
Start: 2025-04-30

## 2025-04-30 RX ORDER — FAMOTIDINE 10 MG/ML
20 INJECTION, SOLUTION INTRAVENOUS ONCE
Status: CANCELLED | OUTPATIENT
Start: 2025-04-30 | End: 2025-04-30

## 2025-05-01 ENCOUNTER — ANESTHESIA EVENT CONVERTED (OUTPATIENT)
Dept: ANESTHESIOLOGY | Facility: HOSPITAL | Age: 35
DRG: 027 | End: 2025-05-01
Payer: COMMERCIAL

## 2025-05-01 ENCOUNTER — ANESTHESIA (OUTPATIENT)
Dept: PERIOP | Facility: HOSPITAL | Age: 35
End: 2025-05-01
Payer: COMMERCIAL

## 2025-05-01 ENCOUNTER — HOSPITAL ENCOUNTER (INPATIENT)
Facility: HOSPITAL | Age: 35
LOS: 3 days | Discharge: HOME | DRG: 027 | End: 2025-05-04
Attending: NEUROLOGICAL SURGERY | Admitting: NEUROLOGICAL SURGERY
Payer: COMMERCIAL

## 2025-05-01 DIAGNOSIS — G50.0 TRIGEMINAL NEURALGIA: ICD-10-CM

## 2025-05-01 DIAGNOSIS — G43.709 CHRONIC MIGRAINE WITHOUT AURA WITHOUT STATUS MIGRAINOSUS, NOT INTRACTABLE: Chronic | ICD-10-CM

## 2025-05-01 DIAGNOSIS — M79.7 FIBROMYALGIA: Primary | ICD-10-CM

## 2025-05-01 DIAGNOSIS — K50.90 CROHN'S DISEASE WITHOUT COMPLICATION, UNSPECIFIED GASTROINTESTINAL TRACT LOCATION: ICD-10-CM

## 2025-05-01 DIAGNOSIS — R76.8 POSITIVE ANA (ANTINUCLEAR ANTIBODY): ICD-10-CM

## 2025-05-01 DIAGNOSIS — F33.1 MODERATE EPISODE OF RECURRENT MAJOR DEPRESSIVE DISORDER: ICD-10-CM

## 2025-05-01 LAB
B-HCG UR QL: NEGATIVE
EXPIRATION DATE: NORMAL
INTERNAL NEGATIVE CONTROL: NEGATIVE
INTERNAL POSITIVE CONTROL: POSITIVE
Lab: NORMAL

## 2025-05-01 PROCEDURE — 25810000003 LACTATED RINGERS PER 1000 ML: Performed by: ANESTHESIOLOGY

## 2025-05-01 PROCEDURE — 25810000003 SODIUM CHLORIDE 0.9 % SOLUTION: Performed by: NEUROLOGICAL SURGERY

## 2025-05-01 PROCEDURE — 61458 CRNEC SOPL XPL/DCMPR CRL NRV: CPT

## 2025-05-01 PROCEDURE — C1713 ANCHOR/SCREW BN/BN,TIS/BN: HCPCS | Performed by: NEUROLOGICAL SURGERY

## 2025-05-01 PROCEDURE — 25010000002 DEXAMETHASONE SODIUM PHOSPHATE 10 MG/ML SOLUTION: Performed by: NEUROLOGICAL SURGERY

## 2025-05-01 PROCEDURE — C1763 CONN TISS, NON-HUMAN: HCPCS | Performed by: NEUROLOGICAL SURGERY

## 2025-05-01 PROCEDURE — 25010000002 HYDRALAZINE PER 20 MG

## 2025-05-01 PROCEDURE — 25010000002 FENTANYL CITRATE (PF) 100 MCG/2ML SOLUTION: Performed by: NURSE ANESTHETIST, CERTIFIED REGISTERED

## 2025-05-01 PROCEDURE — 25010000002 CEFAZOLIN PER 500 MG: Performed by: NEUROLOGICAL SURGERY

## 2025-05-01 PROCEDURE — 25810000003 SODIUM CHLORIDE 0.9 % SOLUTION 250 ML FLEX CONT: Performed by: NEUROLOGICAL SURGERY

## 2025-05-01 PROCEDURE — 25010000002 LIDOCAINE PF 1% 1 % SOLUTION: Performed by: NURSE ANESTHETIST, CERTIFIED REGISTERED

## 2025-05-01 PROCEDURE — 00N00ZZ RELEASE BRAIN, OPEN APPROACH: ICD-10-PCS | Performed by: NEUROLOGICAL SURGERY

## 2025-05-01 PROCEDURE — 25010000002 NICARDIPINE 2.5 MG/ML SOLUTION 10 ML VIAL: Performed by: NEUROLOGICAL SURGERY

## 2025-05-01 PROCEDURE — 25010000002 PROPOFOL 10 MG/ML EMULSION: Performed by: NURSE ANESTHETIST, CERTIFIED REGISTERED

## 2025-05-01 PROCEDURE — 25010000002 DEXAMETHASONE PER 1 MG: Performed by: NEUROLOGICAL SURGERY

## 2025-05-01 PROCEDURE — 25010000002 FENTANYL CITRATE (PF) 50 MCG/ML SOLUTION

## 2025-05-01 PROCEDURE — 25010000002 HYDRALAZINE PER 20 MG: Performed by: NURSE ANESTHETIST, CERTIFIED REGISTERED

## 2025-05-01 PROCEDURE — 69990 MICROSURGERY ADD-ON: CPT | Performed by: NEUROLOGICAL SURGERY

## 2025-05-01 PROCEDURE — 25010000002 SUGAMMADEX 200 MG/2ML SOLUTION: Performed by: NURSE ANESTHETIST, CERTIFIED REGISTERED

## 2025-05-01 PROCEDURE — 25010000002 ONDANSETRON PER 1 MG: Performed by: NURSE ANESTHETIST, CERTIFIED REGISTERED

## 2025-05-01 PROCEDURE — 25010000002 MORPHINE PER 10 MG: Performed by: NEUROLOGICAL SURGERY

## 2025-05-01 PROCEDURE — C1889 IMPLANT/INSERT DEVICE, NOC: HCPCS | Performed by: NEUROLOGICAL SURGERY

## 2025-05-01 PROCEDURE — 81025 URINE PREGNANCY TEST: CPT | Performed by: ANESTHESIOLOGY

## 2025-05-01 PROCEDURE — 25010000002 ESMOLOL 100 MG/10ML SOLUTION: Performed by: NURSE ANESTHETIST, CERTIFIED REGISTERED

## 2025-05-01 PROCEDURE — 61458 CRNEC SOPL XPL/DCMPR CRL NRV: CPT | Performed by: NEUROLOGICAL SURGERY

## 2025-05-01 PROCEDURE — 25010000002 LIDOCAINE PF 1% 1 % SOLUTION: Performed by: ANESTHESIOLOGY

## 2025-05-01 PROCEDURE — 0NR03JZ REPLACEMENT OF SKULL WITH SYNTHETIC SUBSTITUTE, PERCUTANEOUS APPROACH: ICD-10-PCS | Performed by: NEUROLOGICAL SURGERY

## 2025-05-01 PROCEDURE — 25810000003 SODIUM CHLORIDE PER 500 ML: Performed by: NEUROLOGICAL SURGERY

## 2025-05-01 DEVICE — IMPLANTABLE DEVICE
Type: IMPLANTABLE DEVICE | Site: CRANIAL | Status: FUNCTIONAL
Brand: SURGIFOAM® ABSORBABLE GELATIN SPONGE, U.S.P.

## 2025-05-01 DEVICE — DURAGEN® PLUS DURAL REGENERATION MATRIX, 2 IN X 2 IN (5 CM X 5 CM)
Type: IMPLANTABLE DEVICE | Site: CRANIAL | Status: FUNCTIONAL
Brand: DURAGEN® PLUS

## 2025-05-01 DEVICE — PERI-GUARD IS PREPARED FROM BOVINE PERICARDIUM CROSS-LINKED WITH GLUTARALDEHYDE, AND TREATED WITH 1 MOLAR SODIUM HYDROXIDE FOR A MINIMUM OF 60 MINUTES AT 20-25 DEGREES C. PERI-GUARD IS TERMINALLY STERILIZED USING GAMMA IRRADIATION AND PACKAGED WITHIN A DOUBLE-POUCH SYSTEM. THE CONTENTS OF THE UNOPENED, UNDAMAGED OUTER POUCH ARE STERILE.
Type: IMPLANTABLE DEVICE | Site: CRANIAL | Status: FUNCTIONAL
Brand: PERI-GUARD

## 2025-05-01 DEVICE — HORIZON TI MED 6/CART
Type: IMPLANTABLE DEVICE | Site: CRANIAL | Status: FUNCTIONAL
Brand: WECK

## 2025-05-01 DEVICE — SCRW MATRIXNEURO SD TI 4MM: Type: IMPLANTABLE DEVICE | Site: CRANIAL | Status: FUNCTIONAL

## 2025-05-01 DEVICE — ABSORBABLE HEMOSTAT (OXIDIZED REGENERATED CELLULOSE)
Type: IMPLANTABLE DEVICE | Site: CRANIAL | Status: FUNCTIONAL
Brand: SURGICEL

## 2025-05-01 DEVICE — FLOSEAL WITH RECOTHROM - 10ML.
Type: IMPLANTABLE DEVICE | Site: CRANIAL | Status: FUNCTIONAL
Brand: FLOSEAL HEMOSTATIC MATRIX

## 2025-05-01 DEVICE — PLT MESH CMF MATRXNEURO CONTRL RIGDTI100X100: Type: IMPLANTABLE DEVICE | Site: CRANIAL | Status: FUNCTIONAL

## 2025-05-01 DEVICE — HORIZON TI SMALL 6 CLIPS/CART
Type: IMPLANTABLE DEVICE | Site: CRANIAL | Status: FUNCTIONAL
Brand: WECK

## 2025-05-01 DEVICE — SCRW MATRIXNEURO SD TI 3MM: Type: IMPLANTABLE DEVICE | Site: CRANIAL | Status: FUNCTIONAL

## 2025-05-01 RX ORDER — LAMOTRIGINE 100 MG/1
200 TABLET ORAL DAILY
Status: DISCONTINUED | OUTPATIENT
Start: 2025-05-02 | End: 2025-05-04 | Stop reason: HOSPADM

## 2025-05-01 RX ORDER — BISACODYL 5 MG/1
5 TABLET, DELAYED RELEASE ORAL DAILY PRN
Status: DISCONTINUED | OUTPATIENT
Start: 2025-05-01 | End: 2025-05-04 | Stop reason: HOSPADM

## 2025-05-01 RX ORDER — DEXAMETHASONE SODIUM PHOSPHATE 10 MG/ML
10 INJECTION, SOLUTION INTRAMUSCULAR; INTRAVENOUS
Status: COMPLETED | OUTPATIENT
Start: 2025-05-01 | End: 2025-05-01

## 2025-05-01 RX ORDER — PROPOFOL 10 MG/ML
VIAL (ML) INTRAVENOUS AS NEEDED
Status: DISCONTINUED | OUTPATIENT
Start: 2025-05-01 | End: 2025-05-01 | Stop reason: SURG

## 2025-05-01 RX ORDER — MORPHINE SULFATE 2 MG/ML
2 INJECTION, SOLUTION INTRAMUSCULAR; INTRAVENOUS EVERY 4 HOURS PRN
Status: DISCONTINUED | OUTPATIENT
Start: 2025-05-01 | End: 2025-05-03

## 2025-05-01 RX ORDER — HYDROCODONE BITARTRATE AND ACETAMINOPHEN 5; 325 MG/1; MG/1
1 TABLET ORAL EVERY 4 HOURS PRN
Refills: 0 | Status: DISCONTINUED | OUTPATIENT
Start: 2025-05-01 | End: 2025-05-04 | Stop reason: HOSPADM

## 2025-05-01 RX ORDER — ACETAMINOPHEN 325 MG/1
650 TABLET ORAL EVERY 4 HOURS PRN
Status: DISCONTINUED | OUTPATIENT
Start: 2025-05-01 | End: 2025-05-04 | Stop reason: HOSPADM

## 2025-05-01 RX ORDER — LIDOCAINE HYDROCHLORIDE 10 MG/ML
0.5 INJECTION, SOLUTION EPIDURAL; INFILTRATION; INTRACAUDAL; PERINEURAL ONCE AS NEEDED
Status: COMPLETED | OUTPATIENT
Start: 2025-05-01 | End: 2025-05-01

## 2025-05-01 RX ORDER — SODIUM CHLORIDE, SODIUM LACTATE, POTASSIUM CHLORIDE, CALCIUM CHLORIDE 600; 310; 30; 20 MG/100ML; MG/100ML; MG/100ML; MG/100ML
9 INJECTION, SOLUTION INTRAVENOUS CONTINUOUS
Status: ACTIVE | OUTPATIENT
Start: 2025-05-01 | End: 2025-05-02

## 2025-05-01 RX ORDER — AMOXICILLIN 250 MG
2 CAPSULE ORAL 2 TIMES DAILY
Status: DISCONTINUED | OUTPATIENT
Start: 2025-05-01 | End: 2025-05-04 | Stop reason: HOSPADM

## 2025-05-01 RX ORDER — CLONAZEPAM 1 MG/1
1 TABLET ORAL 2 TIMES DAILY
Status: DISCONTINUED | OUTPATIENT
Start: 2025-05-01 | End: 2025-05-04 | Stop reason: HOSPADM

## 2025-05-01 RX ORDER — OXYCODONE AND ACETAMINOPHEN 7.5; 325 MG/1; MG/1
1 TABLET ORAL EVERY 4 HOURS PRN
Refills: 0 | Status: DISCONTINUED | OUTPATIENT
Start: 2025-05-01 | End: 2025-05-04 | Stop reason: HOSPADM

## 2025-05-01 RX ORDER — SODIUM CHLORIDE 9 MG/ML
9 INJECTION, SOLUTION INTRAVENOUS AS NEEDED
Status: DISCONTINUED | OUTPATIENT
Start: 2025-05-01 | End: 2025-05-01 | Stop reason: HOSPADM

## 2025-05-01 RX ORDER — HYDROCODONE BITARTRATE AND ACETAMINOPHEN 7.5; 325 MG/1; MG/1
1 TABLET ORAL EVERY 4 HOURS PRN
Status: DISCONTINUED | OUTPATIENT
Start: 2025-05-01 | End: 2025-05-01 | Stop reason: HOSPADM

## 2025-05-01 RX ORDER — ONDANSETRON 4 MG/1
4 TABLET, ORALLY DISINTEGRATING ORAL EVERY 6 HOURS PRN
Status: DISCONTINUED | OUTPATIENT
Start: 2025-05-01 | End: 2025-05-04 | Stop reason: HOSPADM

## 2025-05-01 RX ORDER — SODIUM CHLORIDE 0.9 % (FLUSH) 0.9 %
3-10 SYRINGE (ML) INJECTION AS NEEDED
Status: DISCONTINUED | OUTPATIENT
Start: 2025-05-01 | End: 2025-05-01 | Stop reason: HOSPADM

## 2025-05-01 RX ORDER — ZIPRASIDONE HYDROCHLORIDE 20 MG/1
60 CAPSULE ORAL NIGHTLY
Status: DISCONTINUED | OUTPATIENT
Start: 2025-05-01 | End: 2025-05-04 | Stop reason: HOSPADM

## 2025-05-01 RX ORDER — ACETAMINOPHEN 160 MG/5ML
650 SOLUTION ORAL EVERY 4 HOURS PRN
Status: DISCONTINUED | OUTPATIENT
Start: 2025-05-01 | End: 2025-05-04 | Stop reason: HOSPADM

## 2025-05-01 RX ORDER — SODIUM CHLORIDE, SODIUM LACTATE, POTASSIUM CHLORIDE, CALCIUM CHLORIDE 600; 310; 30; 20 MG/100ML; MG/100ML; MG/100ML; MG/100ML
9 INJECTION, SOLUTION INTRAVENOUS CONTINUOUS
Status: ACTIVE | OUTPATIENT
Start: 2025-05-02 | End: 2025-05-02

## 2025-05-01 RX ORDER — PROMETHAZINE HYDROCHLORIDE 25 MG/1
25 TABLET ORAL ONCE AS NEEDED
Status: DISCONTINUED | OUTPATIENT
Start: 2025-05-01 | End: 2025-05-01 | Stop reason: HOSPADM

## 2025-05-01 RX ORDER — FENTANYL CITRATE 50 UG/ML
INJECTION, SOLUTION INTRAMUSCULAR; INTRAVENOUS
Status: COMPLETED
Start: 2025-05-01 | End: 2025-05-01

## 2025-05-01 RX ORDER — PREGABALIN 75 MG/1
150 CAPSULE ORAL 3 TIMES DAILY
Status: DISCONTINUED | OUTPATIENT
Start: 2025-05-01 | End: 2025-05-04 | Stop reason: HOSPADM

## 2025-05-01 RX ORDER — ONDANSETRON 2 MG/ML
INJECTION INTRAMUSCULAR; INTRAVENOUS AS NEEDED
Status: DISCONTINUED | OUTPATIENT
Start: 2025-05-01 | End: 2025-05-01 | Stop reason: SURG

## 2025-05-01 RX ORDER — MIDAZOLAM HYDROCHLORIDE 1 MG/ML
1 INJECTION, SOLUTION INTRAMUSCULAR; INTRAVENOUS
Status: DISCONTINUED | OUTPATIENT
Start: 2025-05-01 | End: 2025-05-01 | Stop reason: HOSPADM

## 2025-05-01 RX ORDER — DROPERIDOL 2.5 MG/ML
0.62 INJECTION, SOLUTION INTRAMUSCULAR; INTRAVENOUS
Status: DISCONTINUED | OUTPATIENT
Start: 2025-05-01 | End: 2025-05-01 | Stop reason: HOSPADM

## 2025-05-01 RX ORDER — NICARDIPINE HYDROCHLORIDE 2.5 MG/ML
INJECTION INTRAVENOUS
Status: DISPENSED
Start: 2025-05-01 | End: 2025-05-01

## 2025-05-01 RX ORDER — ROCURONIUM BROMIDE 10 MG/ML
INJECTION, SOLUTION INTRAVENOUS AS NEEDED
Status: DISCONTINUED | OUTPATIENT
Start: 2025-05-01 | End: 2025-05-01 | Stop reason: SURG

## 2025-05-01 RX ORDER — DROPERIDOL 2.5 MG/ML
0.62 INJECTION, SOLUTION INTRAMUSCULAR; INTRAVENOUS ONCE AS NEEDED
Status: DISCONTINUED | OUTPATIENT
Start: 2025-05-01 | End: 2025-05-01 | Stop reason: HOSPADM

## 2025-05-01 RX ORDER — FENTANYL CITRATE 50 UG/ML
50 INJECTION, SOLUTION INTRAMUSCULAR; INTRAVENOUS
Status: DISCONTINUED | OUTPATIENT
Start: 2025-05-01 | End: 2025-05-01 | Stop reason: HOSPADM

## 2025-05-01 RX ORDER — ONDANSETRON 2 MG/ML
4 INJECTION INTRAMUSCULAR; INTRAVENOUS ONCE AS NEEDED
Status: DISCONTINUED | OUTPATIENT
Start: 2025-05-01 | End: 2025-05-01 | Stop reason: HOSPADM

## 2025-05-01 RX ORDER — ESMOLOL HYDROCHLORIDE 10 MG/ML
INJECTION INTRAVENOUS AS NEEDED
Status: DISCONTINUED | OUTPATIENT
Start: 2025-05-01 | End: 2025-05-01 | Stop reason: SDUPTHER

## 2025-05-01 RX ORDER — FENTANYL CITRATE 50 UG/ML
INJECTION, SOLUTION INTRAMUSCULAR; INTRAVENOUS AS NEEDED
Status: DISCONTINUED | OUTPATIENT
Start: 2025-05-01 | End: 2025-05-01 | Stop reason: SDUPTHER

## 2025-05-01 RX ORDER — NALOXONE HCL 0.4 MG/ML
0.4 VIAL (ML) INJECTION
Status: DISCONTINUED | OUTPATIENT
Start: 2025-05-01 | End: 2025-05-04 | Stop reason: HOSPADM

## 2025-05-01 RX ORDER — IPRATROPIUM BROMIDE AND ALBUTEROL SULFATE 2.5; .5 MG/3ML; MG/3ML
3 SOLUTION RESPIRATORY (INHALATION) ONCE AS NEEDED
Status: DISCONTINUED | OUTPATIENT
Start: 2025-05-01 | End: 2025-05-01 | Stop reason: HOSPADM

## 2025-05-01 RX ORDER — CARBAMAZEPINE 200 MG/1
200 TABLET ORAL 3 TIMES DAILY
Status: DISCONTINUED | OUTPATIENT
Start: 2025-05-01 | End: 2025-05-04 | Stop reason: HOSPADM

## 2025-05-01 RX ORDER — SODIUM CHLORIDE 9 MG/ML
INJECTION, SOLUTION INTRAVENOUS AS NEEDED
Status: DISCONTINUED | OUTPATIENT
Start: 2025-05-01 | End: 2025-05-01 | Stop reason: HOSPADM

## 2025-05-01 RX ORDER — FAMOTIDINE 20 MG/1
20 TABLET, FILM COATED ORAL ONCE
Status: COMPLETED | OUTPATIENT
Start: 2025-05-01 | End: 2025-05-01

## 2025-05-01 RX ORDER — DEXAMETHASONE SODIUM PHOSPHATE 4 MG/ML
4 INJECTION, SOLUTION INTRA-ARTICULAR; INTRALESIONAL; INTRAMUSCULAR; INTRAVENOUS; SOFT TISSUE EVERY 6 HOURS
Status: DISCONTINUED | OUTPATIENT
Start: 2025-05-01 | End: 2025-05-02

## 2025-05-01 RX ORDER — HYDRALAZINE HYDROCHLORIDE 20 MG/ML
5 INJECTION INTRAMUSCULAR; INTRAVENOUS
Status: DISCONTINUED | OUTPATIENT
Start: 2025-05-01 | End: 2025-05-01 | Stop reason: HOSPADM

## 2025-05-01 RX ORDER — LABETALOL HYDROCHLORIDE 5 MG/ML
5 INJECTION, SOLUTION INTRAVENOUS
Status: DISCONTINUED | OUTPATIENT
Start: 2025-05-01 | End: 2025-05-01 | Stop reason: HOSPADM

## 2025-05-01 RX ORDER — HYDROCODONE BITARTRATE AND ACETAMINOPHEN 5; 325 MG/1; MG/1
1 TABLET ORAL ONCE AS NEEDED
Status: DISCONTINUED | OUTPATIENT
Start: 2025-05-01 | End: 2025-05-01 | Stop reason: HOSPADM

## 2025-05-01 RX ORDER — PROMETHAZINE HYDROCHLORIDE 25 MG/1
25 SUPPOSITORY RECTAL ONCE AS NEEDED
Status: DISCONTINUED | OUTPATIENT
Start: 2025-05-01 | End: 2025-05-01 | Stop reason: HOSPADM

## 2025-05-01 RX ORDER — HYDROMORPHONE HYDROCHLORIDE 1 MG/ML
0.5 INJECTION, SOLUTION INTRAMUSCULAR; INTRAVENOUS; SUBCUTANEOUS
Status: DISCONTINUED | OUTPATIENT
Start: 2025-05-01 | End: 2025-05-01 | Stop reason: HOSPADM

## 2025-05-01 RX ORDER — MORPHINE SULFATE 4 MG/ML
4 INJECTION, SOLUTION INTRAMUSCULAR; INTRAVENOUS EVERY 4 HOURS PRN
Status: DISCONTINUED | OUTPATIENT
Start: 2025-05-01 | End: 2025-05-03

## 2025-05-01 RX ORDER — HYDRALAZINE HYDROCHLORIDE 20 MG/ML
INJECTION INTRAMUSCULAR; INTRAVENOUS
Status: COMPLETED
Start: 2025-05-01 | End: 2025-05-01

## 2025-05-01 RX ORDER — LIDOCAINE HYDROCHLORIDE 10 MG/ML
INJECTION, SOLUTION EPIDURAL; INFILTRATION; INTRACAUDAL; PERINEURAL AS NEEDED
Status: DISCONTINUED | OUTPATIENT
Start: 2025-05-01 | End: 2025-05-01 | Stop reason: SURG

## 2025-05-01 RX ORDER — MAGNESIUM HYDROXIDE 1200 MG/15ML
LIQUID ORAL AS NEEDED
Status: DISCONTINUED | OUTPATIENT
Start: 2025-05-01 | End: 2025-05-01 | Stop reason: HOSPADM

## 2025-05-01 RX ORDER — DEXMEDETOMIDINE HYDROCHLORIDE 100 UG/ML
INJECTION, SOLUTION INTRAVENOUS AS NEEDED
Status: DISCONTINUED | OUTPATIENT
Start: 2025-05-01 | End: 2025-05-01 | Stop reason: SURG

## 2025-05-01 RX ORDER — NALOXONE HCL 0.4 MG/ML
0.4 VIAL (ML) INJECTION AS NEEDED
Status: DISCONTINUED | OUTPATIENT
Start: 2025-05-01 | End: 2025-05-01 | Stop reason: HOSPADM

## 2025-05-01 RX ORDER — ACETAMINOPHEN 650 MG/1
650 SUPPOSITORY RECTAL EVERY 4 HOURS PRN
Status: DISCONTINUED | OUTPATIENT
Start: 2025-05-01 | End: 2025-05-04 | Stop reason: HOSPADM

## 2025-05-01 RX ORDER — LABETALOL HYDROCHLORIDE 5 MG/ML
10 INJECTION, SOLUTION INTRAVENOUS
Status: DISCONTINUED | OUTPATIENT
Start: 2025-05-01 | End: 2025-05-04 | Stop reason: HOSPADM

## 2025-05-01 RX ORDER — ONDANSETRON 2 MG/ML
4 INJECTION INTRAMUSCULAR; INTRAVENOUS EVERY 6 HOURS PRN
Status: DISCONTINUED | OUTPATIENT
Start: 2025-05-01 | End: 2025-05-04 | Stop reason: HOSPADM

## 2025-05-01 RX ORDER — POLYETHYLENE GLYCOL 3350 17 G/17G
17 POWDER, FOR SOLUTION ORAL DAILY PRN
Status: DISCONTINUED | OUTPATIENT
Start: 2025-05-01 | End: 2025-05-04 | Stop reason: HOSPADM

## 2025-05-01 RX ORDER — SODIUM CHLORIDE 0.9 % (FLUSH) 0.9 %
3 SYRINGE (ML) INJECTION EVERY 12 HOURS SCHEDULED
Status: DISCONTINUED | OUTPATIENT
Start: 2025-05-01 | End: 2025-05-01 | Stop reason: HOSPADM

## 2025-05-01 RX ORDER — LABETALOL HYDROCHLORIDE 5 MG/ML
INJECTION, SOLUTION INTRAVENOUS
Status: COMPLETED
Start: 2025-05-01 | End: 2025-05-01

## 2025-05-01 RX ORDER — ZIPRASIDONE HYDROCHLORIDE 20 MG/1
40 CAPSULE ORAL EVERY MORNING
Status: DISCONTINUED | OUTPATIENT
Start: 2025-05-02 | End: 2025-05-04 | Stop reason: HOSPADM

## 2025-05-01 RX ORDER — BISACODYL 10 MG
10 SUPPOSITORY, RECTAL RECTAL DAILY PRN
Status: DISCONTINUED | OUTPATIENT
Start: 2025-05-01 | End: 2025-05-04 | Stop reason: HOSPADM

## 2025-05-01 RX ORDER — SODIUM CHLORIDE 9 MG/ML
90 INJECTION, SOLUTION INTRAVENOUS CONTINUOUS
Status: ACTIVE | OUTPATIENT
Start: 2025-05-01 | End: 2025-05-01

## 2025-05-01 RX ORDER — VECURONIUM BROMIDE 1 MG/ML
INJECTION, POWDER, FOR SOLUTION INTRAVENOUS AS NEEDED
Status: DISCONTINUED | OUTPATIENT
Start: 2025-05-01 | End: 2025-05-01 | Stop reason: SURG

## 2025-05-01 RX ADMIN — HYDROCODONE BITARTRATE AND ACETAMINOPHEN 1 TABLET: 5; 325 TABLET ORAL at 16:00

## 2025-05-01 RX ADMIN — SODIUM CHLORIDE 2000 MG: 900 INJECTION INTRAVENOUS at 15:21

## 2025-05-01 RX ADMIN — SUGAMMADEX 100 MG: 100 INJECTION, SOLUTION INTRAVENOUS at 10:24

## 2025-05-01 RX ADMIN — FENTANYL CITRATE 100 MCG: 50 INJECTION, SOLUTION INTRAMUSCULAR; INTRAVENOUS at 10:14

## 2025-05-01 RX ADMIN — Medication 5 MG: at 10:52

## 2025-05-01 RX ADMIN — SODIUM CHLORIDE, POTASSIUM CHLORIDE, SODIUM LACTATE AND CALCIUM CHLORIDE: 600; 310; 30; 20 INJECTION, SOLUTION INTRAVENOUS at 09:16

## 2025-05-01 RX ADMIN — FAMOTIDINE 20 MG: 20 TABLET, FILM COATED ORAL at 06:27

## 2025-05-01 RX ADMIN — SODIUM CHLORIDE 90 ML/HR: 9 INJECTION, SOLUTION INTRAVENOUS at 14:34

## 2025-05-01 RX ADMIN — OXYCODONE HYDROCHLORIDE AND ACETAMINOPHEN 1 TABLET: 7.5; 325 TABLET ORAL at 13:37

## 2025-05-01 RX ADMIN — Medication 3 ML: at 12:40

## 2025-05-01 RX ADMIN — FENTANYL CITRATE 100 MCG: 50 INJECTION, SOLUTION INTRAMUSCULAR; INTRAVENOUS at 07:33

## 2025-05-01 RX ADMIN — SODIUM CHLORIDE 15 MG/HR: 9 INJECTION, SOLUTION INTRAVENOUS at 17:49

## 2025-05-01 RX ADMIN — SODIUM CHLORIDE 2000 MG: 900 INJECTION INTRAVENOUS at 21:37

## 2025-05-01 RX ADMIN — DEXAMETHASONE SODIUM PHOSPHATE 4 MG: 4 INJECTION INTRA-ARTICULAR; INTRALESIONAL; INTRAMUSCULAR; INTRAVENOUS; SOFT TISSUE at 12:30

## 2025-05-01 RX ADMIN — LIDOCAINE HYDROCHLORIDE 50 MG: 10 INJECTION, SOLUTION EPIDURAL; INFILTRATION; INTRACAUDAL; PERINEURAL at 06:59

## 2025-05-01 RX ADMIN — SODIUM CHLORIDE 15 MG/HR: 9 INJECTION, SOLUTION INTRAVENOUS at 12:29

## 2025-05-01 RX ADMIN — FENTANYL CITRATE 50 MCG: 50 INJECTION, SOLUTION INTRAMUSCULAR; INTRAVENOUS at 11:34

## 2025-05-01 RX ADMIN — ROCURONIUM BROMIDE 50 MG: 10 INJECTION INTRAVENOUS at 08:14

## 2025-05-01 RX ADMIN — ESMOLOL HYDROCHLORIDE 100 MG: 100 INJECTION, SOLUTION INTRAVENOUS at 06:59

## 2025-05-01 RX ADMIN — HYDRALAZINE HYDROCHLORIDE 5 MG: 20 INJECTION INTRAMUSCULAR; INTRAVENOUS at 11:28

## 2025-05-01 RX ADMIN — SODIUM CHLORIDE 11.5 MG/HR: 9 INJECTION, SOLUTION INTRAVENOUS at 21:05

## 2025-05-01 RX ADMIN — CLONAZEPAM 1 MG: 1 TABLET ORAL at 20:50

## 2025-05-01 RX ADMIN — ROCURONIUM BROMIDE 10 MG: 10 INJECTION INTRAVENOUS at 09:36

## 2025-05-01 RX ADMIN — SODIUM CHLORIDE 5 MG/HR: 9 INJECTION, SOLUTION INTRAVENOUS at 10:51

## 2025-05-01 RX ADMIN — SODIUM CHLORIDE 15 MG/HR: 9 INJECTION, SOLUTION INTRAVENOUS at 11:15

## 2025-05-01 RX ADMIN — ONDANSETRON 4 MG: 2 INJECTION INTRAMUSCULAR; INTRAVENOUS at 09:59

## 2025-05-01 RX ADMIN — CARBAMAZEPINE 200 MG: 200 TABLET ORAL at 20:49

## 2025-05-01 RX ADMIN — HYDRALAZINE HYDROCHLORIDE 5 MG: 20 INJECTION INTRAMUSCULAR; INTRAVENOUS at 11:50

## 2025-05-01 RX ADMIN — DEXAMETHASONE SODIUM PHOSPHATE 4 MG: 4 INJECTION INTRA-ARTICULAR; INTRALESIONAL; INTRAMUSCULAR; INTRAVENOUS; SOFT TISSUE at 20:49

## 2025-05-01 RX ADMIN — ZIPRASIDONE HYDROCHLORIDE 60 MG: 20 CAPSULE ORAL at 20:49

## 2025-05-01 RX ADMIN — DEXMEDETOMIDINE HYDROCHLORIDE 12 MCG: 100 INJECTION, SOLUTION INTRAVENOUS at 10:04

## 2025-05-01 RX ADMIN — OXYCODONE HYDROCHLORIDE AND ACETAMINOPHEN 1 TABLET: 7.5; 325 TABLET ORAL at 18:52

## 2025-05-01 RX ADMIN — SODIUM CHLORIDE, POTASSIUM CHLORIDE, SODIUM LACTATE AND CALCIUM CHLORIDE 9 ML/HR: 600; 310; 30; 20 INJECTION, SOLUTION INTRAVENOUS at 06:25

## 2025-05-01 RX ADMIN — PREGABALIN 150 MG: 75 CAPSULE ORAL at 20:50

## 2025-05-01 RX ADMIN — SODIUM CHLORIDE 10 MG/HR: 9 INJECTION, SOLUTION INTRAVENOUS at 10:55

## 2025-05-01 RX ADMIN — CARBAMAZEPINE 200 MG: 200 TABLET ORAL at 15:20

## 2025-05-01 RX ADMIN — LABETALOL HYDROCHLORIDE 5 MG: 5 INJECTION, SOLUTION INTRAVENOUS at 10:52

## 2025-05-01 RX ADMIN — MORPHINE SULFATE 2 MG: 2 INJECTION, SOLUTION INTRAMUSCULAR; INTRAVENOUS at 12:33

## 2025-05-01 RX ADMIN — SUGAMMADEX 200 MG: 100 INJECTION, SOLUTION INTRAVENOUS at 10:18

## 2025-05-01 RX ADMIN — SODIUM CHLORIDE 15 MG/HR: 9 INJECTION, SOLUTION INTRAVENOUS at 14:22

## 2025-05-01 RX ADMIN — PROPOFOL 25 MCG/KG/MIN: 10 INJECTION, EMULSION INTRAVENOUS at 07:23

## 2025-05-01 RX ADMIN — SENNOSIDES AND DOCUSATE SODIUM 2 TABLET: 50; 8.6 TABLET ORAL at 20:50

## 2025-05-01 RX ADMIN — VECURONIUM BROMIDE 20 MG: 1 INJECTION, POWDER, LYOPHILIZED, FOR SOLUTION INTRAVENOUS at 06:59

## 2025-05-01 RX ADMIN — PROPOFOL 250 MG: 10 INJECTION, EMULSION INTRAVENOUS at 06:59

## 2025-05-01 RX ADMIN — SODIUM CHLORIDE 2 G: 900 INJECTION INTRAVENOUS at 07:01

## 2025-05-01 RX ADMIN — DEXAMETHASONE SODIUM PHOSPHATE 10 MG: 10 INJECTION, SOLUTION INTRAMUSCULAR; INTRAVENOUS at 07:25

## 2025-05-01 RX ADMIN — LIDOCAINE HYDROCHLORIDE 0.5 ML: 10 INJECTION, SOLUTION EPIDURAL; INFILTRATION; INTRACAUDAL; PERINEURAL at 06:25

## 2025-05-01 RX ADMIN — MORPHINE SULFATE 2 MG: 2 INJECTION, SOLUTION INTRAMUSCULAR; INTRAVENOUS at 17:49

## 2025-05-01 RX ADMIN — PREGABALIN 150 MG: 75 CAPSULE ORAL at 14:34

## 2025-05-01 NOTE — ANESTHESIA POSTPROCEDURE EVALUATION
Patient: Corrine Eaton    Procedure Summary       Date: 05/01/25 [facility] / [facility]:  [facility]    Anesthesia Start: 0655 Anesthesia Stop: 1036    Procedure: CRANIOTOMY SUBOCCIPITAL (Right: Head) Diagnosis:       Trigeminal neuralgia      (Trigeminal neuralgia [G50.0])    Surgeons: Paul Caraballo MD Provider: Federico Aguirre MD    Anesthesia Type: general ASA Status: 2            Anesthesia Type: general    Vitals  Vitals Value Taken Time   BP     Temp     Pulse 86 05/01/25 10:37   Resp     SpO2 89 % 05/01/25 10:37   Vitals shown include unfiled device data.        Post Anesthesia Care and Evaluation    Patient location during evaluation: PACU  Patient participation: complete - patient participated  Level of consciousness: responsive to verbal stimuli  Pain score: 0  Pain management: adequate    Airway patency: patent  Anesthetic complications: No anesthetic complications  PONV Status: none  Cardiovascular status: hemodynamically stable and acceptable  Respiratory status: nonlabored ventilation, acceptable, nasal cannula and spontaneous ventilation  Hydration status: acceptable    Comments: VSS  No anesthesia care post op

## 2025-05-01 NOTE — PAYOR COMM NOTE
"Ref# [id number]   Clinical Update    SHAR Beckman, RN  Utilization Review  Phone [phone]  Fax [phone]    [facility]  [address] [id number]         Corrine Puentes (34 y.o. Female)       Date of Birth   1990    Social Security Number       Address   [address] [place] [address]    Home Phone   [phone]    MRN   [id number]       Mandaen   Scientology    Marital Status                               Admission Date   5/1/2025    Admission Type   Elective    Admitting Provider   Paul Caraballo MD    Attending Provider   Paul Caraballo MD    Department, Room/Bed   [facility] 2B ICU, N239/1       Discharge Date       Discharge Disposition       Discharge Destination                                 Attending Provider: Paul Caraballo MD    Allergies: Imitrex [Sumatriptan]    Isolation: None   Infection: None   Code Status: CPR    Ht: 162.6 cm (64\")   Wt: 101 kg (223 lb)    Admission Cmt: None   Principal Problem: Trigeminal neuralgia [G50.0]                   Active Insurance as of 5/1/2025       Primary Coverage       Payor Plan Insurance Group Employer/Plan Group    [facility] BLUE CROSS [facility] [id number]       Payor Plan Address Payor Plan Phone Number Payor Plan Fax Number Effective Dates    [address]   1/1/2025 - None Entered    [address] [address]         Subscriber Name Subscriber Birth Date Member ID       SHANDA PUENTES 1990 [id number]                     Emergency Contacts        (Rel.) Home Phone Work Phone Mobile Phone    SHANDA PUENTES (Spouse) [phone] -- [phone]                 History & Physical        Mak Moctezuma PA at 05/01/25 0641       Attestation signed by Paul Caraballo MD at 05/01/25 0655      .                          Paul Caraballo MD   Physician  Neurosurgery     H&P      Signed     Encounter Date: 4/2/2025   Related encounter: Prep for " Surgery from 2025 in [facility] DARRION ORDERS ONLY with Paul Caraballo MD     Signed       Expand All Collapse All    Patient: Corrine Eaton  : 1990     Primary Care Provider: Rukhsana Hilliard DO     Requesting Provider: As above           History     Chief Complaint: Right facial pain.     History of Present Illness: Ms. Eaton has been experiencing persistent right facial pain for approximately 2 years, initially described as an electric shock sensation. The pain was initially localized to her forehead, eye, and ear but has since spread to her entire face, often extending into her jawline, teeth, and tongue. The pain is characterized by a constant tingling ache, which occasionally radiates to the back of her head. She also experiences episodes of intense pain, typically at night, which are often preceded by stressful days. 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 She also reports daily facial numbness, prickliness, and tingling. She has difficulty chewing and brushing her teeth due to the pain. She also reports cognitive difficulties, including slow processing speed and word-finding difficulties, which she attributes to her medication. Showers consistently exacerbate her symptoms, and exposure to wind invariably results in a headache. Any physical contact with her face, including pulling her hair up, triggers the pain. Talking makes the pain worse. Despite being treated for migraines by her primary care physician for a year, she found no relief and sought the expertise of Dr. Still. He also treated her for migraines using various modalities, but the pain persisted, leading to episodes of collapse due to its intensity. A trial of Botox provided temporary relief for 2 days. Approximately 8 months ago, she consulted with Dr. Wilson, who diagnosed her with trigeminal neuralgia and initiated treatment. Her current medication regimen includes Tegretol 200  mg 3 times daily and Lyrica 100 mg 3 times daily. Initially, these medications provided relief, with Tegretol offering a 2-month pain-free period. However, the pain recurred in 12/2024, prompting an increase in her Lyrica dosage from 25 mg to 100 mg 3 times daily in 01/2025. An increase in her Tegretol dosage to 300 mg 3 times daily resulted in an unbalanced gait and frequent falls, leading to a reduction back to 200 mg 3 times daily.  She reports no change in her symptoms.  She is largely incapacitated because of the pain and the side effects related to her medicines.     Review of Systems   Constitutional:  Negative for activity change, appetite change, chills, diaphoresis, fatigue, fever and unexpected weight change.   HENT:  Negative for congestion, dental problem, drooling, ear discharge, ear pain, facial swelling, hearing loss, mouth sores, nosebleeds, postnasal drip, rhinorrhea, sinus pressure, sinus pain, sneezing, sore throat, tinnitus, trouble swallowing and voice change.    Eyes:  Negative for photophobia, pain, discharge, redness, itching and visual disturbance.   Respiratory:  Negative for apnea, cough, choking, chest tightness, shortness of breath, wheezing and stridor.    Cardiovascular:  Negative for chest pain, palpitations and leg swelling.   Gastrointestinal:  Negative for abdominal distention, abdominal pain, anal bleeding, blood in stool, constipation, diarrhea, nausea, rectal pain and vomiting.   Endocrine: Negative for cold intolerance, heat intolerance, polydipsia, polyphagia and polyuria.   Genitourinary:  Negative for decreased urine volume, difficulty urinating, dyspareunia, dysuria, enuresis, flank pain, frequency, genital sores, hematuria, menstrual problem, pelvic pain, urgency, vaginal bleeding, vaginal discharge and vaginal pain.   Musculoskeletal:  Negative for arthralgias, back pain, gait problem, joint swelling, myalgias, neck pain and neck stiffness.   Skin:  Negative for color  change, pallor, rash and wound.   Allergic/Immunologic: Negative for environmental allergies, food allergies and immunocompromised state.   Neurological:  Positive for dizziness and headaches. Negative for tremors, seizures, syncope, facial asymmetry, speech difficulty, weakness, light-headedness and numbness.   Hematological:  Negative for adenopathy. Does not bruise/bleed easily.   Psychiatric/Behavioral:  Negative for agitation, behavioral problems, confusion, decreased concentration, dysphoric mood, hallucinations, self-injury, sleep disturbance and suicidal ideas. The patient is not nervous/anxious and is not hyperactive.       The patient's past medical history, past surgical history, family history, and social history have been reviewed at length in the electronic medical record.     Medical History        Past Medical History:   Diagnosis Date    ADHD (attention deficit hyperactivity disorder)      Anxiety      Arthritis      Bipolar disorder      Cholelithiasis 11/2015    Chronic pain disorder      Claustrophobia      Colon polyp      Crohn's disease 09/2010    Depression      GERD (gastroesophageal reflux disease)      Headache      Inflammatory bowel disease      Irritable bowel syndrome 09/2010    Lactose intolerance Always    Low back pain      Panic disorder      Peptic ulceration 9/2021    Self-injurious behavior      Thoracic disc disorder Bulging disc         Surgical History         Past Surgical History:   Procedure Laterality Date    ABDOMINAL SURGERY   09/2010    APPENDECTOMY        CHOLECYSTECTOMY        COLON SURGERY        COLONOSCOPY        ENDOMETRIAL ABLATION        EYE SURGERY   01/25     Laser    TUBAL ABDOMINAL LIGATION   10/2022               Family History   Problem Relation Age of Onset    Hyperlipidemia Mother      Hypertension Mother      Anxiety disorder Father      Hyperlipidemia Father      ADD / ADHD Father      Depression Father      Hypertension Father      Arthritis Maternal  Grandmother      Depression Maternal Grandmother      Mental illness Maternal Grandmother      Bipolar disorder Maternal Grandmother      Paranoid behavior Maternal Grandmother      Alcohol abuse Maternal Aunt      Bipolar disorder Maternal Aunt      Colon cancer Neg Hx      Colon polyps Neg Hx      Esophageal cancer Neg Hx        Social History   Social History            Socioeconomic History    Marital status:    Tobacco Use    Smoking status: Never       Passive exposure: Never    Smokeless tobacco: Never   Vaping Use    Vaping status: Never Used   Substance and Sexual Activity    Alcohol use: Yes       Alcohol/week: 5.0 standard drinks of alcohol       Types: 5 Glasses of wine per week    Drug use: Never    Sexual activity: Yes       Partners: Male       Birth control/protection: Tubal ligation, Surgical                  Allergies         Allergies   Allergen Reactions    Imitrex [Sumatriptan] Shortness Of Breath and Headache       Made migraine worse            Medications Ordered Prior to Encounter          Current Outpatient Medications on File Prior to Visit   Medication Sig Dispense Refill    carBAMazepine (TEGretol) 200 MG tablet Take 1 tablet by mouth 3 times a day.        clonazePAM (KlonoPIN) 1 MG tablet Take 1 tablet by mouth 2 (Two) Times a Day. 60 tablet 1    Humira, 2 Pen, 40 MG/0.4ML Pen-injector Kit INJECT 1 PEN UNDER THE SKIN EVERY 14 DAYS. 2 each 11    HYDROcodone-acetaminophen (Norco) 5-325 MG per tablet Take 1 tablet by mouth Every 6 (Six) Hours As Needed for Moderate Pain. 10 tablet 0    lamoTRIgine (LaMICtal) 200 MG tablet Take 1 tablet by mouth Daily. 90 tablet 1    OXcarbazepine (TRILEPTAL) 300 MG tablet Take 1 tablet by mouth 3 times a day.        pregabalin (LYRICA) 100 MG capsule Take 1 capsule by mouth 3 times a day.        ziprasidone (GEODON) 40 MG capsule Take 1 capsule by mouth Every Morning. 30 capsule 2    ziprasidone (Geodon) 60 MG capsule Take 1 capsule by mouth Every  "Night. 30 capsule 2                Current Facility-Administered Medications on File Prior to Visit   Medication Dose Route Frequency Provider Last Rate Last Admin    [COMPLETED] gadobenate dimeglumine (MULTIHANCE) injection 20 mL  20 mL Intravenous Once in imaging Paul Caraballo MD   20 mL at 04/01/25 1951              Physical Exam:   Ht 162.6 cm (64\")   Wt 102 kg (224 lb)   BMI 38.45 kg/m²   Deferred     Medical Decision Making     Data Review:   (All imaging studies were personally reviewed unless stated otherwise)  MRI of the brain with and without gadolinium demonstrates a small blood vessel in the region of the right trigeminal nerve within the within the cistern.  This certainly could be an offending blood vessel.  I do not see any enhancing lesions to suggest a neoplastic process.     Diagnosis:   Intractable right trigeminal neuralgia.     Treatment Options:   I discussed treatment options with the patient and her  including microvascular decompression or CyberKnife radiosurgery.  Given her age I would recommend microvascular decompression.  I have also offered to refer her to another center that would do para cutaneous procedures but she is less enthusiastic about that.  Ultimately we have elected to pursue right trigeminal microvascular decompression.  Risks are said to include but not be limited to infection, CSF leak, cranial nerve compromise, stroke, and/or death.  It is also possible that she does not improve with the surgical intervention or that her symptoms recur.  I cannot guarantee that we will get her entirely off of her current medication regimen.  The patient and her  wish to proceed with the surgical intervention.  She will need to hold her Humira at least 2 weeks preceding the surgery.                    Routing History  [facility] Pre-op    Full history and physical note from office is up to date.     /80 (BP Location: Right arm, Patient Position: " "Lying)   Pulse 82   Temp 98.3 °F (36.8 °C) (Temporal)   Resp 16   Ht 162.6 cm (64\")   Wt 101 kg (223 lb)   LMP 04/01/2025 (Approximate)   SpO2 97%   BMI 38.28 kg/m²   Patient denies allergy to contrast dye or latex  IMM:  Influenza: No  Pneumococcal: No  Tetanus: Up-to-date  Lungs: Clear to auscultation bilateral bases  Cardiovascular: S1 and S2 without rubs murmurs or gallops  Abdomen: Soft, nontender, bowel sounds present throughout.    LAB Results:  Lab Results   Component Value Date    WBC 7.20 04/24/2025    HGB 14.0 04/24/2025    HCT 42.4 04/24/2025    MCV 89.8 04/24/2025     04/24/2025    NEUTROABS 8.08 (H) 10/10/2024    GLUCOSE 95 04/24/2025    BUN 8 04/24/2025    CREATININE 0.82 04/24/2025    EGFRIFNONA 67 07/29/2021     04/24/2025    K 4.5 04/24/2025     04/24/2025    CO2 23.0 04/24/2025    CALCIUM 9.2 04/24/2025    ALBUMIN 4.3 10/10/2024    AST 14 10/10/2024    ALT 20 10/10/2024    BILITOT <0.2 10/10/2024       Cancer Staging (if applicable)  Cancer Patient: __ yes __no __unknown__N/A; If yes, clinical stage T:__ N:__M:__, stage group or __N/A  Impression: Trigeminal neuralgia  Recurrent major depressive disorder  History of Crohn's disease  Positive MODE  Fibromyalgia  Plan: Right occipital craniotomy, right  MARYANNE Combs   05/01/25   6:42 AM EDT     Electronically signed by Paul Caraballo MD at 05/01/25 1777          Operative/Procedure Notes (all)        Paul Caraballo MD at 05/01/25 3653  Version 1 of 1         NEUROSURGICAL OPERATIVE NOTE        PREOPERATIVE DIAGNOSIS:    Right trigeminal neuralgia      POSTOPERATIVE DIAGNOSIS:  Name      PROCEDURE:  1.  Right suboccipital craniectomy for trigeminal microvascular decompression  2.  Titanium mesh cranioplasty  3.  Use of intraoperative microscope for microdissection      SURGEON:  Paul Caraballo M.D.      ASSISTANT: Snow Rondon PA-C    PAC assisted " with:   Suctioning   Retraction   Tying   Suturing   Closing   Application of dressing   Skilled neurosurgery PA assistance was necessary to perform this procedure.        ANESTHESIA:  General      ESTIMATED BLOOD LOSS: 50 mL      SPECIMEN: None      DRAINS: None      COMPLICATIONS:  None        CLINICAL NOTE:  The patient is a 34-year-old woman with protracted severe right facial pain.  She is thought to have a syndrome of trigeminal neuralgia.  Despite numerous nonoperative measures her pain has been poorly controlled and she has been intolerant of medication.  As such she presents at this time for microvascular decompression of the right trigeminal nerve.  The nature of the procedure as well as the potential risks, complications, limitations, and alternatives to the procedure were discussed at length with the patient and the patient has agreed to proceed with surgery.      TECHNICAL NOTE:   Dictation on: 05/01/2025 10:15 AM by: JEREMIE CARABALLO [[id number]]           Jeremie Caraballo M.D.      Electronically signed by Jeremie Caraballo MD at 05/01/25 1015       Physician Progress Notes (all)    No notes of this type exist for this encounter.       Consult Notes (all)    No notes of this type exist for this encounter.

## 2025-05-01 NOTE — LETTER
[facility]    Patient: Corrine Eaton  : 1990      Dear uRkhsana,    I just completed the right suboccipital craniectomy for microvascular decompression of the trigeminal nerve on Ms. Eaton to try and address her severe trigeminal neuralgia.  Thus far all has gone well.  She will likely be hospitalized for couple of days.      With kindest regards,    Paul Caraballo MD  25  10:19 EDT

## 2025-05-01 NOTE — ANESTHESIA PREPROCEDURE EVALUATION
Anesthesia Evaluation                  Airway   Mallampati: I  TM distance: >3 FB  No difficulty expected  Dental      Pulmonary    Cardiovascular     ECG reviewed        Neuro/Psych  (+) headaches, numbness, psychiatric history  GI/Hepatic/Renal/Endo    (+) obesity, GERD, PUD    Musculoskeletal     Abdominal    Substance History      OB/GYN          Other   arthritis,                 Anesthesia Plan    ASA 2     general     intravenous induction     Anesthetic plan, risks, benefits, and alternatives have been provided, discussed and informed consent has been obtained with: patient.    Plan discussed with CRNA.    CODE STATUS:

## 2025-05-01 NOTE — OP NOTE
NEUROSURGICAL OPERATIVE NOTE        PREOPERATIVE DIAGNOSIS:    Right trigeminal neuralgia      POSTOPERATIVE DIAGNOSIS:  Same      PROCEDURE:  1.  Right suboccipital craniectomy for trigeminal microvascular decompression  2.  Titanium mesh cranioplasty  3.  Use of intraoperative microscope for microdissection      SURGEON:  Paul Caraballo M.D.      ASSISTANT: Snow Rondon PA-C    PAC assisted with:   Suctioning   Retraction   Tying   Suturing   Closing   Application of dressing   Skilled neurosurgery PA assistance was necessary to perform this procedure.        ANESTHESIA:  General      ESTIMATED BLOOD LOSS: 50 mL      SPECIMEN: None      DRAINS: None      COMPLICATIONS:  None        CLINICAL NOTE:  The patient is a 34-year-old woman with protracted severe right facial pain.  She is thought to have a syndrome of trigeminal neuralgia.  Despite numerous nonoperative measures her pain has been poorly controlled and she has been intolerant of medication.  As such she presents at this time for microvascular decompression of the right trigeminal nerve.  The nature of the procedure as well as the potential risks, complications, limitations, and alternatives to the procedure were discussed at length with the patient and the patient has agreed to proceed with surgery.      TECHNICAL NOTE:  The patient was brought to the operating room and placed on the operating room table in the supine position. She was placed on a beanbag. General endotracheal anesthesia was achieved. She was then turned onto her side, special care was ensured to protect pressure points and an axillary roll was utilized. Her head was affixed to the operating room table via Brooks 3-point pin fixation. She was maintained in a true lateral position with her right side up. She did receive preoperative Decadron and antibiotics. Her right retroauricular region was prepared and draped in the usual fashion. A linear incision was made centered on the  mastoid process. This was approximately 2 finger widths medial to the mastoid process. Suboccipital cranium was exposed. The slightly larger than quarter-sized craniectomy was performed using drill and Kerrison punches. Modest bleeding points were controlled with bipolar cautery and bone wax. There were no air cells identified to pack. The dura was opened and reflected laterally. I was able to enter the cistern inferiorly and drain a good deal of CSF which allowed for excellent relaxation of the cerebellar hemisphere. The Goldberg retractor was utilized. The cerebellum was retracted from lateral to medial. Relatively modest retraction was required given the previously noted CSF drainage. Some arachnoid was taken down to free up the brain from the 7th/8th nerve complex. I then worked cephalad and deeper. A large petrosal vein complex was essentially wrapped around the trigeminal nerve. The inferior branches of this venous complex were coagulated and divided. The arachnoid around the trigeminal was very thickened and somewhat opaque. I then followed some of the additional veins deeper. Some of these were coagulated a bit more. Ultimately I was able to free up and mobilize the trigeminal nerve. I explored the nerve from above and below. There were no arterial vessels impinging on the nerve. Good decompression of the nerve was accomplished. The major branch of the petrosal vein had been preserved. The wound was washed out with a saline solution. A bovine pericardial patch was sewn into place with 4-0 dural silk sutures to cover the dural defect. This was covered with Surgicel and nonsuturable DuraGen. Titanium mesh plate was trimmed to size and secured in place over the craniectomy defect using Synthes screws. All of the dissection to take down arachnoid and free up the trigeminal nerve was done under the microscope. The fascia and muscle were approximated in interrupted fashion with 2-0 Vicryl suture as was the fascia.  Subcutaneous tissues were closed in layers with 3-0 Vicryl suture. The skin was closed in a running locked fashion with a 3-0 nylon suture. Sterile dressing was applied. She was subsequently placed on her cart, extubated and taken to recovery room in satisfactory condition. There were no overt intraoperative complications.             Paul Caraballo M.D.

## 2025-05-01 NOTE — ANESTHESIA PROCEDURE NOTES
Airway  Reason: elective    Date/Time: 5/1/2025 7:00 AM  Airway not difficult    General Information and Staff    Patient location during procedure: OR  CRNA/CAA: Khoa Quarles CRNA    Indications and Patient Condition  Indications for airway management: airway protection    Preoxygenated: yes  MILS not maintained throughout    Mask difficulty assessment: 1 - vent by mask    Final Airway Details    Final airway type: endotracheal airway      Successful airway: ETT  Cuffed: yes   Successful intubation technique: direct laryngoscopy  Endotracheal tube insertion site: oral  Blade: Radha  Blade size: 3  ETT size (mm): 7.5  Cormack-Lehane Classification: grade I - full view of glottis  Placement verified by: chest auscultation and capnometry   Measured from: lips  ETT/EBT  to lips (cm): 20  Number of attempts at approach: 1  Assessment: lips, teeth, and gum same as pre-op and atraumatic intubation    Additional Comments  Negative epigastric sounds, Breath sound equal bilaterally with symmetric chest rise and fall

## 2025-05-01 NOTE — H&P
Paul Caraballo MD   Physician  Neurosurgery     H&P      Signed     Encounter Date: 2025   Related encounter: Prep for Surgery from 2025 in [facility] DARRION ORDERS ONLY with Paul Caraballo MD     Signed       Expand All Collapse All    Patient: Corrine Eaton  : 1990     Primary Care Provider: Rukhsana Hilliard DO     Requesting Provider: As above           History     Chief Complaint: Right facial pain.     History of Present Illness: Ms. Eaton has been experiencing persistent right facial pain for approximately 2 years, initially described as an electric shock sensation. The pain was initially localized to her forehead, eye, and ear but has since spread to her entire face, often extending into her jawline, teeth, and tongue. The pain is characterized by a constant tingling ache, which occasionally radiates to the back of her head. She also experiences episodes of intense pain, typically at night, which are often preceded by stressful days. 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 She also reports daily facial numbness, prickliness, and tingling. She has difficulty chewing and brushing her teeth due to the pain. She also reports cognitive difficulties, including slow processing speed and word-finding difficulties, which she attributes to her medication. Showers consistently exacerbate her symptoms, and exposure to wind invariably results in a headache. Any physical contact with her face, including pulling her hair up, triggers the pain. Talking makes the pain worse. Despite being treated for migraines by her primary care physician for a year, she found no relief and sought the expertise of Dr. Still. He also treated her for migraines using various modalities, but the pain persisted, leading to episodes of collapse due to its intensity. A trial of Botox provided temporary relief for 2 days. Approximately 8 months ago, she consulted with  Dr. Wilson, who diagnosed her with trigeminal neuralgia and initiated treatment. Her current medication regimen includes Tegretol 200 mg 3 times daily and Lyrica 100 mg 3 times daily. Initially, these medications provided relief, with Tegretol offering a 2-month pain-free period. However, the pain recurred in 12/2024, prompting an increase in her Lyrica dosage from 25 mg to 100 mg 3 times daily in 01/2025. An increase in her Tegretol dosage to 300 mg 3 times daily resulted in an unbalanced gait and frequent falls, leading to a reduction back to 200 mg 3 times daily.  She reports no change in her symptoms.  She is largely incapacitated because of the pain and the side effects related to her medicines.     Review of Systems   Constitutional:  Negative for activity change, appetite change, chills, diaphoresis, fatigue, fever and unexpected weight change.   HENT:  Negative for congestion, dental problem, drooling, ear discharge, ear pain, facial swelling, hearing loss, mouth sores, nosebleeds, postnasal drip, rhinorrhea, sinus pressure, sinus pain, sneezing, sore throat, tinnitus, trouble swallowing and voice change.    Eyes:  Negative for photophobia, pain, discharge, redness, itching and visual disturbance.   Respiratory:  Negative for apnea, cough, choking, chest tightness, shortness of breath, wheezing and stridor.    Cardiovascular:  Negative for chest pain, palpitations and leg swelling.   Gastrointestinal:  Negative for abdominal distention, abdominal pain, anal bleeding, blood in stool, constipation, diarrhea, nausea, rectal pain and vomiting.   Endocrine: Negative for cold intolerance, heat intolerance, polydipsia, polyphagia and polyuria.   Genitourinary:  Negative for decreased urine volume, difficulty urinating, dyspareunia, dysuria, enuresis, flank pain, frequency, genital sores, hematuria, menstrual problem, pelvic pain, urgency, vaginal bleeding, vaginal discharge and vaginal pain.   Musculoskeletal:   Negative for arthralgias, back pain, gait problem, joint swelling, myalgias, neck pain and neck stiffness.   Skin:  Negative for color change, pallor, rash and wound.   Allergic/Immunologic: Negative for environmental allergies, food allergies and immunocompromised state.   Neurological:  Positive for dizziness and headaches. Negative for tremors, seizures, syncope, facial asymmetry, speech difficulty, weakness, light-headedness and numbness.   Hematological:  Negative for adenopathy. Does not bruise/bleed easily.   Psychiatric/Behavioral:  Negative for agitation, behavioral problems, confusion, decreased concentration, dysphoric mood, hallucinations, self-injury, sleep disturbance and suicidal ideas. The patient is not nervous/anxious and is not hyperactive.       The patient's past medical history, past surgical history, family history, and social history have been reviewed at length in the electronic medical record.     Medical History        Past Medical History:   Diagnosis Date    ADHD (attention deficit hyperactivity disorder)      Anxiety      Arthritis      Bipolar disorder      Cholelithiasis 11/2015    Chronic pain disorder      Claustrophobia      Colon polyp      Crohn's disease 09/2010    Depression      GERD (gastroesophageal reflux disease)      Headache      Inflammatory bowel disease      Irritable bowel syndrome 09/2010    Lactose intolerance Always    Low back pain      Panic disorder      Peptic ulceration 9/2021    Self-injurious behavior      Thoracic disc disorder Bulging disc         Surgical History         Past Surgical History:   Procedure Laterality Date    ABDOMINAL SURGERY   09/2010    APPENDECTOMY        CHOLECYSTECTOMY        COLON SURGERY        COLONOSCOPY        ENDOMETRIAL ABLATION        EYE SURGERY   01/25     Laser    TUBAL ABDOMINAL LIGATION   10/2022               Family History   Problem Relation Age of Onset    Hyperlipidemia Mother      Hypertension Mother      Anxiety  disorder Father      Hyperlipidemia Father      ADD / ADHD Father      Depression Father      Hypertension Father      Arthritis Maternal Grandmother      Depression Maternal Grandmother      Mental illness Maternal Grandmother      Bipolar disorder Maternal Grandmother      Paranoid behavior Maternal Grandmother      Alcohol abuse Maternal Aunt      Bipolar disorder Maternal Aunt      Colon cancer Neg Hx      Colon polyps Neg Hx      Esophageal cancer Neg Hx        Social History   Social History            Socioeconomic History    Marital status:    Tobacco Use    Smoking status: Never       Passive exposure: Never    Smokeless tobacco: Never   Vaping Use    Vaping status: Never Used   Substance and Sexual Activity    Alcohol use: Yes       Alcohol/week: 5.0 standard drinks of alcohol       Types: 5 Glasses of wine per week    Drug use: Never    Sexual activity: Yes       Partners: Male       Birth control/protection: Tubal ligation, Surgical                  Allergies         Allergies   Allergen Reactions    Imitrex [Sumatriptan] Shortness Of Breath and Headache       Made migraine worse            Medications Ordered Prior to Encounter          Current Outpatient Medications on File Prior to Visit   Medication Sig Dispense Refill    carBAMazepine (TEGretol) 200 MG tablet Take 1 tablet by mouth 3 times a day.        clonazePAM (KlonoPIN) 1 MG tablet Take 1 tablet by mouth 2 (Two) Times a Day. 60 tablet 1    Humira, 2 Pen, 40 MG/0.4ML Pen-injector Kit INJECT 1 PEN UNDER THE SKIN EVERY 14 DAYS. 2 each 11    HYDROcodone-acetaminophen (Norco) 5-325 MG per tablet Take 1 tablet by mouth Every 6 (Six) Hours As Needed for Moderate Pain. 10 tablet 0    lamoTRIgine (LaMICtal) 200 MG tablet Take 1 tablet by mouth Daily. 90 tablet 1    OXcarbazepine (TRILEPTAL) 300 MG tablet Take 1 tablet by mouth 3 times a day.        pregabalin (LYRICA) 100 MG capsule Take 1 capsule by mouth 3 times a day.        ziprasidone  "(GEODON) 40 MG capsule Take 1 capsule by mouth Every Morning. 30 capsule 2    ziprasidone (Geodon) 60 MG capsule Take 1 capsule by mouth Every Night. 30 capsule 2                Current Facility-Administered Medications on File Prior to Visit   Medication Dose Route Frequency Provider Last Rate Last Admin    [COMPLETED] gadobenate dimeglumine (MULTIHANCE) injection 20 mL  20 mL Intravenous Once in imaging Paul Caraballo MD   20 mL at 04/01/25 1951              Physical Exam:   Ht 162.6 cm (64\")   Wt 102 kg (224 lb)   BMI 38.45 kg/m²   Deferred     Medical Decision Making     Data Review:   (All imaging studies were personally reviewed unless stated otherwise)  MRI of the brain with and without gadolinium demonstrates a small blood vessel in the region of the right trigeminal nerve within the within the cistern.  This certainly could be an offending blood vessel.  I do not see any enhancing lesions to suggest a neoplastic process.     Diagnosis:   Intractable right trigeminal neuralgia.     Treatment Options:   I discussed treatment options with the patient and her  including microvascular decompression or CyberKnife radiosurgery.  Given her age I would recommend microvascular decompression.  I have also offered to refer her to another center that would do para cutaneous procedures but she is less enthusiastic about that.  Ultimately we have elected to pursue right trigeminal microvascular decompression.  Risks are said to include but not be limited to infection, CSF leak, cranial nerve compromise, stroke, and/or death.  It is also possible that she does not improve with the surgical intervention or that her symptoms recur.  I cannot guarantee that we will get her entirely off of her current medication regimen.  The patient and her  wish to proceed with the surgical intervention.  She will need to hold her Humira at least 2 weeks preceding the surgery.                    Routing History  [facility] " "[facility] Pre-op    Full history and physical note from office is up to date.     /80 (BP Location: Right arm, Patient Position: Lying)   Pulse 82   Temp 98.3 °F (36.8 °C) (Temporal)   Resp 16   Ht 162.6 cm (64\")   Wt 101 kg (223 lb)   LMP 04/01/2025 (Approximate)   SpO2 97%   BMI 38.28 kg/m²   Patient denies allergy to contrast dye or latex  IMM:  Influenza: No  Pneumococcal: No  Tetanus: Up-to-date  Lungs: Clear to auscultation bilateral bases  Cardiovascular: S1 and S2 without rubs murmurs or gallops  Abdomen: Soft, nontender, bowel sounds present throughout.    LAB Results:  Lab Results   Component Value Date    WBC 7.20 04/24/2025    HGB 14.0 04/24/2025    HCT 42.4 04/24/2025    MCV 89.8 04/24/2025     04/24/2025    NEUTROABS 8.08 (H) 10/10/2024    GLUCOSE 95 04/24/2025    BUN 8 04/24/2025    CREATININE 0.82 04/24/2025    EGFRIFNONA 67 07/29/2021     04/24/2025    K 4.5 04/24/2025     04/24/2025    CO2 23.0 04/24/2025    CALCIUM 9.2 04/24/2025    ALBUMIN 4.3 10/10/2024    AST 14 10/10/2024    ALT 20 10/10/2024    BILITOT <0.2 10/10/2024       Cancer Staging (if applicable)  Cancer Patient: __ yes __no __unknown__N/A; If yes, clinical stage T:__ N:__M:__, stage group or __N/A  Impression: Trigeminal neuralgia  Recurrent major depressive disorder  History of Crohn's disease  Positive MODE  Fibromyalgia  Plan: Right occipital craniotomy, right  MARYANNE Combs   05/01/25   6:42 AM EDT   "

## 2025-05-01 NOTE — ANESTHESIA PROCEDURE NOTES
Arterial Line      Patient reassessed immediately prior to procedure    Patient location during procedure: pre-op  Stop Time:5/1/2025 6:30 AM       Line placed for hemodynamic monitoring.  Performed By   JACQUE/CAA: Khoa Quarles CRNA   Preanesthetic Checklist  Completed: patient identified, IV checked, site marked, risks and benefits discussed, surgical consent, monitors and equipment checked, pre-op evaluation and timeout performed  Arterial Line Prep    Sterile Tech: cap, gloves and sterile barriers  Prep: ChloraPrep  Patient monitoring: blood pressure monitoring, continuous pulse oximetry and EKG  Arterial Line Procedure   Laterality:right  Location:  radial artery  Catheter size: 20 G   Guidance: ultrasound guided  PROCEDURE NOTE/ULTRASOUND INTERPRETATION.  Using ultrasound guidance the potential vascular sites for insertion of the catheter were visualized to determine the patency of the vessel to be used for vascular access.  After selecting the appropriate site for insertion, the needle was visualized under ultrasound being inserted into the radial artery, followed by ultrasound confirmation of wire and catheter placement. There were no abnormalities seen on ultrasound; an image was taken; and the patient tolerated the procedure with no complications.   Number of attempts: 1  Successful placement: yes Images: still images not obtained  Post Assessment   Dressing Type: line sutured, occlusive dressing applied, secured with tape and wrist guard applied.   Complications no  Circ/Move/Sens Assessment: normal and unchanged.   Patient Tolerance: patient tolerated the procedure well with no apparent complications

## 2025-05-02 ENCOUNTER — APPOINTMENT (OUTPATIENT)
Dept: CT IMAGING | Facility: HOSPITAL | Age: 35
DRG: 027 | End: 2025-05-02
Payer: COMMERCIAL

## 2025-05-02 LAB
ANION GAP SERPL CALCULATED.3IONS-SCNC: 13 MMOL/L (ref 5–15)
BUN SERPL-MCNC: 6 MG/DL (ref 6–20)
BUN/CREAT SERPL: 9.2 (ref 7–25)
CALCIUM SPEC-SCNC: 8.5 MG/DL (ref 8.6–10.5)
CHLORIDE SERPL-SCNC: 106 MMOL/L (ref 98–107)
CO2 SERPL-SCNC: 19 MMOL/L (ref 22–29)
CREAT SERPL-MCNC: 0.65 MG/DL (ref 0.57–1)
DEPRECATED RDW RBC AUTO: 38.4 FL (ref 37–54)
EGFRCR SERPLBLD CKD-EPI 2021: 117.9 ML/MIN/1.73
ERYTHROCYTE [DISTWIDTH] IN BLOOD BY AUTOMATED COUNT: 11.9 % (ref 12.3–15.4)
GLUCOSE SERPL-MCNC: 135 MG/DL (ref 65–99)
HCT VFR BLD AUTO: 38.6 % (ref 34–46.6)
HGB BLD-MCNC: 13.2 G/DL (ref 12–15.9)
MCH RBC QN AUTO: 30.3 PG (ref 26.6–33)
MCHC RBC AUTO-ENTMCNC: 34.2 G/DL (ref 31.5–35.7)
MCV RBC AUTO: 88.5 FL (ref 79–97)
PLATELET # BLD AUTO: 233 10*3/MM3 (ref 140–450)
PMV BLD AUTO: 9.4 FL (ref 6–12)
POTASSIUM SERPL-SCNC: 4 MMOL/L (ref 3.5–5.2)
RBC # BLD AUTO: 4.36 10*6/MM3 (ref 3.77–5.28)
SODIUM SERPL-SCNC: 138 MMOL/L (ref 136–145)
WBC NRBC COR # BLD AUTO: 14.05 10*3/MM3 (ref 3.4–10.8)

## 2025-05-02 PROCEDURE — 97116 GAIT TRAINING THERAPY: CPT

## 2025-05-02 PROCEDURE — 80048 BASIC METABOLIC PNL TOTAL CA: CPT | Performed by: NEUROLOGICAL SURGERY

## 2025-05-02 PROCEDURE — 97530 THERAPEUTIC ACTIVITIES: CPT

## 2025-05-02 PROCEDURE — 25010000002 DEXAMETHASONE PER 1 MG: Performed by: NEUROLOGICAL SURGERY

## 2025-05-02 PROCEDURE — 99024 POSTOP FOLLOW-UP VISIT: CPT | Performed by: NEUROLOGICAL SURGERY

## 2025-05-02 PROCEDURE — 70450 CT HEAD/BRAIN W/O DYE: CPT

## 2025-05-02 PROCEDURE — 97166 OT EVAL MOD COMPLEX 45 MIN: CPT

## 2025-05-02 PROCEDURE — 85027 COMPLETE CBC AUTOMATED: CPT | Performed by: NEUROLOGICAL SURGERY

## 2025-05-02 PROCEDURE — 97162 PT EVAL MOD COMPLEX 30 MIN: CPT

## 2025-05-02 RX ADMIN — CLONAZEPAM 1 MG: 1 TABLET ORAL at 20:42

## 2025-05-02 RX ADMIN — CARBAMAZEPINE 200 MG: 200 TABLET ORAL at 08:39

## 2025-05-02 RX ADMIN — HYDROCODONE BITARTRATE AND ACETAMINOPHEN 1 TABLET: 5; 325 TABLET ORAL at 13:20

## 2025-05-02 RX ADMIN — LAMOTRIGINE 200 MG: 100 TABLET ORAL at 08:38

## 2025-05-02 RX ADMIN — PREGABALIN 150 MG: 75 CAPSULE ORAL at 08:38

## 2025-05-02 RX ADMIN — CLONAZEPAM 1 MG: 1 TABLET ORAL at 08:38

## 2025-05-02 RX ADMIN — OXYCODONE HYDROCHLORIDE AND ACETAMINOPHEN 1 TABLET: 7.5; 325 TABLET ORAL at 02:53

## 2025-05-02 RX ADMIN — CARBAMAZEPINE 200 MG: 200 TABLET ORAL at 20:42

## 2025-05-02 RX ADMIN — SENNOSIDES AND DOCUSATE SODIUM 2 TABLET: 50; 8.6 TABLET ORAL at 20:42

## 2025-05-02 RX ADMIN — SENNOSIDES AND DOCUSATE SODIUM 2 TABLET: 50; 8.6 TABLET ORAL at 08:38

## 2025-05-02 RX ADMIN — CARBAMAZEPINE 200 MG: 200 TABLET ORAL at 14:28

## 2025-05-02 RX ADMIN — HYDROCODONE BITARTRATE AND ACETAMINOPHEN 1 TABLET: 5; 325 TABLET ORAL at 08:39

## 2025-05-02 RX ADMIN — DEXAMETHASONE SODIUM PHOSPHATE 4 MG: 4 INJECTION INTRA-ARTICULAR; INTRALESIONAL; INTRAMUSCULAR; INTRAVENOUS; SOFT TISSUE at 01:26

## 2025-05-02 RX ADMIN — PREGABALIN 150 MG: 75 CAPSULE ORAL at 14:28

## 2025-05-02 RX ADMIN — OXYCODONE HYDROCHLORIDE AND ACETAMINOPHEN 1 TABLET: 7.5; 325 TABLET ORAL at 15:32

## 2025-05-02 RX ADMIN — PREGABALIN 150 MG: 75 CAPSULE ORAL at 20:42

## 2025-05-02 RX ADMIN — HYDROCODONE BITARTRATE AND ACETAMINOPHEN 1 TABLET: 5; 325 TABLET ORAL at 18:50

## 2025-05-02 RX ADMIN — ZIPRASIDONE HYDROCHLORIDE 40 MG: 20 CAPSULE ORAL at 06:10

## 2025-05-02 RX ADMIN — ZIPRASIDONE HYDROCHLORIDE 60 MG: 20 CAPSULE ORAL at 20:42

## 2025-05-02 NOTE — THERAPY EVALUATION
Patient Name: Corrine Eaton  : 1990    MRN: [id number]                              Today's Date: 2025       Admit Date: 2025    Visit Dx:     ICD-10-CM ICD-9-CM   1. Trigeminal neuralgia  G50.0 350.1     Patient Active Problem List   Diagnosis    Moderate episode of recurrent major depressive disorder    Crohn's disease without complication    Chronic migraine without aura without status migrainosus, not intractable    Positive MODE (antinuclear antibody)    Fibromyalgia    Trigeminal neuralgia     Past Medical History:   Diagnosis Date    ADHD (attention deficit hyperactivity disorder)     Anxiety     Arthritis     Bipolar disorder     Cholelithiasis 2015    Claustrophobia     Colon polyp     Crohn's disease 2010    Depression     GERD (gastroesophageal reflux disease)     Irritable bowel syndrome 2010    Lactose intolerance Always    Low back pain     Panic disorder     Peptic ulceration 2021    Self-injurious behavior     Thoracic disc disorder Bulging disc    Trigeminal neuralgia      Past Surgical History:   Procedure Laterality Date    APPENDECTOMY      CHOLECYSTECTOMY      COLON SURGERY      COLECTOMY    COLONOSCOPY      ENDOMETRIAL ABLATION      EYE SURGERY      Laser    TRIGEMINAL NERVE DECOMPRESSION Right 2025    Procedure: CRANIOTOMY SUBOCCIPITAL;  Surgeon: Paul Caraballo MD;  Location: [facility];  Service: Neurosurgery;  Laterality: Right;    TUBAL ABDOMINAL LIGATION  10/2022      General Information       Row Name 25 1142          OT Time and Intention    Document Type evaluation  -SA     Mode of Treatment occupational therapy  -SA       Row Name 25 1142          General Information    Patient Profile Reviewed yes  -SA     Prior Level of Function independent:;all household mobility;community mobility;gait;ADL's  I with all ADLs and mobility  -SA     Existing Precautions/Restrictions fall  -SA     Barriers to Rehab medically complex  -SA       Row  Name 05/02/25 1142          Living Environment    Current Living Arrangements home  -     People in Home spouse;child(sabina), dependent  -       Row Name 05/02/25 1142          Home Main Entrance    Number of Stairs, Main Entrance one  -SA     Stair Railings, Main Entrance none  -SA       Row Name 05/02/25 1142          Stairs Within Home, Primary    Number of Stairs, Within Home, Primary none  -SA       Row Name 05/02/25 1142          Cognition    Orientation Status (Cognition) oriented x 3  -SA       Row Name 05/02/25 1142          Safety Issues/Impairments Affecting Functional Mobility    Safety Issues Affecting Function (Mobility) other (see comments)  impaired balance  -SA     Impairments Affecting Function (Mobility) balance;pain;strength  -     Comment, Safety Issues/Impairments (Mobility) Min A for fxnl mobility without AD, CGA with RWx  -               User Key  (r) = Recorded By, (t) = Taken By, (c) = Cosigned By      Initials Name Provider Type    SA Earl Hollingsworth OT Occupational Therapist                     Mobility/ADL's       Row Name 05/02/25 1144          Bed Mobility    Bed Mobility rolling left;sit-supine  -SA     Rolling Left [place] (Bed Mobility) independent  -     Sit-Supine [place] (Bed Mobility) minimum assist (75% patient effort);other (see comments)  for LEs  -SA     Bed Mobility, Safety Issues other (see comments)  pain  -       Row Name 05/02/25 1144          Transfers    Transfers sit-stand transfer;stand-sit transfer  -       Row Name 05/02/25 1144          Sit-Stand Transfer    Sit-Stand [place] (Transfers) contact guard  -     Comment, (Sit-Stand Transfer) x1 from chair  -       Row Name 05/02/25 1144          Stand-Sit Transfer    Stand-Sit [place] (Transfers) contact guard  -     Assistive Device (Stand-Sit Transfers) walker, front-wheeled  -       Row Name 05/02/25 1144          Functional Mobility    Functional Mobility- Ind. Level  verbal cues required;contact guard assist;minimum assist (75% patient effort)  Min A household distance with LUE support, CGA with RWx for greater than household distance  -     Functional Mobility- Device walker, front-wheeled  -     Functional Mobility- Safety Issues other (see comments)  impaired balance, pain  -     Functional Mobility- Comment Pt ambulated household distance with Min A via LUE support, then ambulated greater than household distance with RWx and CGA  -SA     Patient was able to Ambulate yes  -SA       Row Name 05/02/25 1144          Activities of Daily Living    BADL Assessment/Intervention upper body dressing  -SA       Row Name 05/02/25 1144          Upper Body Dressing Assessment/Training    [place] Level (Upper Body Dressing) doff;don;minimum assist (75% patient effort)  -     Position (Upper Body Dressing) unsupported sitting  -     Comment, (Upper Body Dressing) Don/doffed gown with Min A sitting unsupported in chair and at EOB  -               User Key  (r) = Recorded By, (t) = Taken By, (c) = Cosigned By      Initials Name Provider Type     Earl Hollingsworth OT Occupational Therapist                   Obj/Interventions       Row Name 05/02/25 1149          Sensory Assessment (Somatosensory)    Sensory Assessment (Somatosensory) sensation intact  -SA       Row Name 05/02/25 1149          Vision Assessment/Intervention    Visual Impairment/Limitations corrective lenses full-time;WFL  -SA       Row Name 05/02/25 1149          Range of Motion Comprehensive    General Range of Motion bilateral upper extremity ROM WFL  -SA       Row Name 05/02/25 1149          Strength Comprehensive (MMT)    General Manual Muscle Testing (MMT) Assessment no strength deficits identified  -     Comment, General Manual Muscle Testing (MMT) Assessment BUE 4/5 throughout, pain limiting additional applied resistance  -SA       Row Name 05/02/25 1149          Balance    Balance Assessment  sitting static balance;sitting dynamic balance;standing static balance;standing dynamic balance  -SA     Static Sitting Balance independent  -SA     Dynamic Sitting Balance standby assist  -SA     Position, Sitting Balance sitting in chair;sitting edge of bed  -SA     Static Standing Balance contact guard  -SA     Dynamic Standing Balance minimal assist;contact guard  -SA     Position/Device Used, Standing Balance walker, front-wheeled;other (see comments)  CGA sit to stand, Min A with LUE support for dynamic standing balance, progressed to CGA while using RWx  -SA     Balance Interventions sitting;standing;sit to stand;supported;static;dynamic;minimal challenge;occupation based/functional task  -SA     Comment, Balance Min A without AD, progressing to CGA with RWx  -SA               User Key  (r) = Recorded By, (t) = Taken By, (c) = Cosigned By      Initials Name Provider Type    SA Earl Hollingsworth OT Occupational Therapist                   Goals/Plan       Row Name 05/02/25 1156          Transfer Goal 1 (OT)    Activity/Assistive Device (Transfer Goal 1, OT) commode  -SA     [place] Level/Cues Needed (Transfer Goal 1, OT) independent  -SA     Time Frame (Transfer Goal 1, OT) long term goal (LTG);10 days  -SA     Progress/Outcome (Transfer Goal 1, OT) new goal  -SA       Row Name 05/02/25 1156          Dressing Goal 1 (OT)    Activity/Device (Dressing Goal 1, OT) lower body dressing  -SA     [place]/Cues Needed (Dressing Goal 1, OT) independent  -SA     Time Frame (Dressing Goal 1, OT) long term goal (LTG);10 days  -SA     Progress/Outcome (Dressing Goal 1, OT) new goal  -SA       Row Name 05/02/25 1156          Grooming Goal 1 (OT)    Activity/Device (Grooming Goal 1, OT) hair care;oral care;wash face, hands  -SA     [place] (Grooming Goal 1, OT) independent  Standing at sink  -SA     Time Frame (Grooming Goal 1, OT) short term goal (STG);5 days  -SA     Progress/Outcome (Grooming Goal 1, OT)  new goal  -[facility] Name 05/02/25 1156          Therapy Assessment/Plan (OT)    Planned Therapy Interventions (OT) activity tolerance training;BADL retraining;functional balance retraining;occupation/activity based interventions;patient/caregiver education/training;strengthening exercise;transfer/mobility retraining  -               User Key  (r) = Recorded By, (t) = Taken By, (c) = Cosigned By      Initials Name Provider Type    SA Earl Hollingsworth OT Occupational Therapist                   Clinical Impression       [facility] Name 05/02/25 1152          Pain Assessment    Pretreatment Pain Rating 8/10  -SA     Posttreatment Pain Rating 9/10  -     Pain Location head  -SA     Pain Management Interventions activity modification encouraged;exercise or physical activity utilized  -     Response to Pain Interventions activity participation with increased pain  -[facility] Name 05/02/25 1152          Plan of Care Review    Progress no change  -     Outcome Evaluation OT evaluation complete. Pt presents with generalized weakness, impaired balance, decreased activity tolerance, and decreased independence with ADLs and fxnl mobility and would benefit from skilled OT services to improve fxnl status and return to PLOF. OT recommends home with outpatient PT. Pt would also benefit from rolling walker at discharge.  -[facility] Name 05/02/25 1152          Therapy Assessment/Plan (OT)    Patient/Family Therapy Goal Statement (OT) Return home  -     Rehab Potential (OT) good  -     Criteria for Skilled Therapeutic Interventions Met (OT) yes;meets criteria;skilled treatment is necessary  -     Therapy Frequency (OT) daily  -     Predicted Duration of Therapy Intervention (OT) 10 days  -       Row Name 05/02/25 1152          Therapy Plan Review/Discharge Plan (OT)    Equipment Needs Upon Discharge (OT) walker, rolling  -     Anticipated Discharge Disposition (OT) home with outpatient therapy services  -        Row Name 05/02/25 1152          Vital Signs    Pre Systolic BP Rehab 115  -SA     Pre Treatment Diastolic BP 79  -SA     Post Systolic BP Rehab 110  -SA     Post Treatment Diastolic BP 64  -SA     Pretreatment Heart Rate (beats/min) 84  -SA     Posttreatment Heart Rate (beats/min) 89  -SA     Pre SpO2 (%) 97  -SA     O2 Delivery Pre Treatment room air  -SA     Post SpO2 (%) 99  -SA     O2 Delivery Post Treatment room air  -SA     Pre Patient Position Sitting  -SA     Post Patient Position Side Lying  -SA       Row Name 05/02/25 1152          Positioning and Restraints    Pre-Treatment Position sitting in chair/recliner  -SA     Post Treatment Position bed  -SA     In Bed notified nsg;side lying left;call light within reach;encouraged to call for assist;exit alarm on;with family/caregiver  -               User Key  (r) = Recorded By, (t) = Taken By, (c) = Cosigned By      Initials Name Provider Type    Earl Epps OT Occupational Therapist                   Outcome Measures       Row Name 05/02/25 1158          How much help from another is currently needed...    Putting on and taking off regular lower body clothing? 2  -SA     Bathing (including washing, rinsing, and drying) 2  -SA     Toileting (which includes using toilet bed pan or urinal) 3  -SA     Putting on and taking off regular upper body clothing 3  -SA     Taking care of personal grooming (such as brushing teeth) 3  -SA     Eating meals 4  -SA     AM-PAC 6 Clicks Score (OT) 17  -SA       Row Name 05/02/25 1158          Functional Assessment    Outcome Measure Options AM-PAC 6 Clicks Daily Activity (OT)  -               User Key  (r) = Recorded By, (t) = Taken By, (c) = Cosigned By      Initials Name Provider Type    Earl Epps OT Occupational Therapist                    Occupational Therapy Education       Title: PT OT SLP Therapies (In Progress)       Topic: Occupational Therapy (In Progress)       Point: ADL training (In  Progress)       Learning Progress Summary            Patient Acceptance, E, NR by  at 5/2/2025 0943                      Point: Body mechanics (In Progress)       Learning Progress Summary            Patient Acceptance, E, NR by  at 5/2/2025 0943                                      User Key       Initials Effective Dates Name Provider Type Discipline     04/16/25 -  Earl Hollingsworth OT Occupational Therapist OT                  OT Recommendation and Plan  Planned Therapy Interventions (OT): activity tolerance training, BADL retraining, functional balance retraining, occupation/activity based interventions, patient/caregiver education/training, strengthening exercise, transfer/mobility retraining  Therapy Frequency (OT): daily  Plan of Care Review  Progress: no change  Outcome Evaluation: OT evaluation complete. Pt presents with generalized weakness, impaired balance, decreased activity tolerance, and decreased independence with ADLs and fxnl mobility and would benefit from skilled OT services to improve fxnl status and return to PLOF. OT recommends home with outpatient PT. Pt would also benefit from rolling walker at discharge.     Time Calculation:   Evaluation Complexity (OT)  Review Occupational Profile/Medical/Therapy History Complexity: brief/low complexity  Assessment, Occupational Performance/Identification of Deficit Complexity: 3-5 performance deficits  Clinical Decision Making Complexity (OT): detailed assessment/moderate complexity  Overall Complexity of Evaluation (OT): moderate complexity     Time Calculation- OT       Row Name 05/02/25 1200             Time Calculation- OT    OT Start Time 0943  -SA      OT Received On 05/02/25 -SA      OT Goal Re-Cert Due Date 05/12/25  -SA         Timed Charges    61616 - OT Therapeutic Activity Minutes 10  -SA         Untimed Charges    OT Eval/Re-eval Minutes 46  -SA         Total Minutes    Timed Charges Total Minutes 10  -SA      Untimed Charges Total  Minutes 46  -SA       Total Minutes 56  -SA                User Key  (r) = Recorded By, (t) = Taken By, (c) = Cosigned By      Initials Name Provider Type    SA Earl Hollingsworth OT Occupational Therapist                  Therapy Charges for Today       Code Description Service Date Service Provider Modifiers Qty    [id number]  OT THERAPEUTIC ACT EA 15 MIN 5/2/2025 Earl Hollingsworth OT GO 1    [id number]  OT EVAL MOD COMPLEXITY 4 5/2/2025 Earl Hollingsworth OT GO 1                 Earl Hollingsworth OT  5/2/2025

## 2025-05-02 NOTE — CASE MANAGEMENT/SOCIAL WORK
Discharge Planning Assessment  [facility]     Patient Name: Corrine Eaton  MRN: [id number]  Today's Date: 5/2/2025    Admit Date: 5/1/2025    Plan: Home with Family   Discharge Needs Assessment       Row Name 05/02/25 1640       Living Environment    People in Home spouse;child(sabina), dependent    Name(s) of People in Home Blas Eaton - spouse    Current Living Arrangements home    Potentially Unsafe Housing Conditions none    Primary Care Provided by self    Provides Primary Care For no one    Family Caregiver if Needed spouse    Family Caregiver Names Marivel- spouse    Quality of Family Relationships helpful;involved;supportive    Able to Return to Prior Arrangements yes       Transition Planning    Patient/Family Anticipates Transition to home with family    Patient/Family Anticipated Services at Transition outpatient care    Transportation Anticipated family or friend will provide       Discharge Needs Assessment    Readmission Within the Last 30 Days no previous admission in last 30 days    Equipment Currently Used at Home none    Concerns to be Addressed discharge planning    Equipment Needed After Discharge walker, rolling    Outpatient/Agency/Support Group Needs outpatient therapy    Discharge Facility/Level of Care Needs outpatient therapy    Current Discharge Risk chronically ill;physical impairment                   Discharge Plan       Row Name 05/02/25 3377       Plan    Plan Home with Family    Patient/Family in Agreement with Plan yes    Plan Comments I have met with Mrs. Eaton and her  at the bedside today to initiate a discharge plan.  She states that she lives in a home she shares with Marivel and their 3 children in [facility].  She reports that prior to this hospital admission she was independent with activities of daily living and mobility.  She denies use of DME and current receipt of home health/Op services.  I have confirmed that her PCP is Gianna Hilliard DO and her  insurance is [employer].  She denies difficulty affording/obtaining prescription medications.  She anticipates returning home and states that her  will provide assistance and transportation as needed.  I have discussed PT/OT recommendations for OPPT.  She verbalizes understanding and is not sure what OPPT location she will use. I have entered contact info for [facility] location in [facility] near her home in the [facility].  I have delivered a PT/OT recommended rolling walker to bedside.  CM will cont to follow plan of care and provide assistance with discharge planning as recommendations become available.    Final Discharge Disposition Code 01 - home or self-care                       Demographic Summary       Row Name 05/02/25 1640       General Information    Admission Type inpatient    Arrived From home    Referral Source admission list    Reason for Consult discharge planning    Preferred Language English       Contact Information    Permission Granted to Share Info With                    Functional Status       Row Name 05/02/25 1640       Functional Status, IADL    Medications independent    Meal Preparation independent    Housekeeping independent    Laundry independent    Shopping assistive person       Employment/    Employment Status employed full-time                          Virgen Funes RN

## 2025-05-02 NOTE — THERAPY EVALUATION
Patient Name: Corrine Eaton  : 1990    MRN: [id number]                              Today's Date: 2025       Admit Date: 2025    Visit Dx:     ICD-10-CM ICD-9-CM   1. Trigeminal neuralgia  G50.0 350.1     Patient Active Problem List   Diagnosis    Moderate episode of recurrent major depressive disorder    Crohn's disease without complication    Chronic migraine without aura without status migrainosus, not intractable    Positive MODE (antinuclear antibody)    Fibromyalgia    Trigeminal neuralgia     Past Medical History:   Diagnosis Date    ADHD (attention deficit hyperactivity disorder)     Anxiety     Arthritis     Bipolar disorder     Cholelithiasis 2015    Claustrophobia     Colon polyp     Crohn's disease 2010    Depression     GERD (gastroesophageal reflux disease)     Irritable bowel syndrome 2010    Lactose intolerance Always    Low back pain     Panic disorder     Peptic ulceration 2021    Self-injurious behavior     Thoracic disc disorder Bulging disc    Trigeminal neuralgia      Past Surgical History:   Procedure Laterality Date    APPENDECTOMY      CHOLECYSTECTOMY      COLON SURGERY      COLECTOMY    COLONOSCOPY      ENDOMETRIAL ABLATION      EYE SURGERY      Laser    TRIGEMINAL NERVE DECOMPRESSION Right 2025    Procedure: CRANIOTOMY SUBOCCIPITAL;  Surgeon: Paul Caraballo MD;  Location: [facility];  Service: Neurosurgery;  Laterality: Right;    TUBAL ABDOMINAL LIGATION  10/2022      General Information       Row Name 25 1235          Physical Therapy Time and Intention    Document Type evaluation  -     Mode of Treatment physical therapy  -       Row Name 25 1235          General Information    Patient Profile Reviewed yes  -MC     Prior Level of Function independent:;all household mobility;community mobility;gait;transfer;bed mobility  -     Existing Precautions/Restrictions fall  -     Barriers to Rehab medically complex  -       Row Name  05/02/25 1235          Living Environment    Current Living Arrangements home  -     People in Home spouse;child(sabina), dependent  -       Row Name 05/02/25 1235          Home Main Entrance    Number of Stairs, Main Entrance one  -     Stair Railings, Main Entrance none  -       Row Name 05/02/25 1235          Stairs Within Home, Primary    Number of Stairs, Within Home, Primary none  -       Row Name 05/02/25 1235          Cognition    Orientation Status (Cognition) oriented x 3  -       Row Name 05/02/25 1235          Safety Issues/Impairments Affecting Functional Mobility    Safety Issues Affecting Function (Mobility) positioning of assistive device;insight into deficits/self-awareness  -     Impairments Affecting Function (Mobility) balance;pain;strength  -               User Key  (r) = Recorded By, (t) = Taken By, (c) = Cosigned By      Initials Name Provider Type     Miranda Newby PT Physical Therapist                   Mobility       Row Name 05/02/25 1236          Bed Mobility    Bed Mobility rolling left;sit-supine  -     Rolling Left [place] (Bed Mobility) independent  -     Sit-Supine [place] (Bed Mobility) minimum assist (75% patient effort);other (see comments)  assist with lowering trunk  -     Comment, (Bed Mobility) limited by pain and fatigue  -       Row Name 05/02/25 1236          Sit-Stand Transfer    Sit-Stand [place] (Transfers) contact guard  -     Comment, (Sit-Stand Transfer) No safety cues required  -       Row Name 05/02/25 1236          Gait/Stairs (Locomotion)    [place] Level (Gait) verbal cues;minimum assist (75% patient effort)  -     Assistive Device (Gait) walker, front-wheeled  -     Patient was able to Ambulate yes  -     Distance in Feet (Gait) 80  -     Deviations/Abnormal Patterns (Gait) gait speed decreased  -     Comment, (Gait/Stairs) Notably limited by pain in head/incision, rated 8/10. Min A with HHA with  multiple small LOB episodes. Remaining 70ft with CGA with RWx.  -               User Key  (r) = Recorded By, (t) = Taken By, (c) = Cosigned By      Initials Name Provider Type    Miranda Win PT Physical Therapist                   Obj/Interventions       [facility] Name 05/02/25 1240          Range of Motion Comprehensive    General Range of Motion bilateral lower extremity ROM WFL  -[facility] Name 05/02/25 1240          Strength Comprehensive (MMT)    General Manual Muscle Testing (MMT) Assessment no strength deficits identified  -     Comment, General Manual Muscle Testing (MMT) Assessment BLE WFL  -       Row Name 05/02/25 1240          Balance    Static Sitting Balance independent  -     Dynamic Sitting Balance independent  -     Position, Sitting Balance unsupported;sitting in chair  -     Static Standing Balance contact guard  -     Dynamic Standing Balance minimal assist;contact guard  min A prior to use of RWx  -     Position/Device Used, Standing Balance walker, rolling  -[facility] Name 05/02/25 1240          Sensory Assessment (Somatosensory)    Sensory Assessment (Somatosensory) LE sensation intact  -               User Key  (r) = Recorded By, (t) = Taken By, (c) = Cosigned By      Initials Name Provider Type    Miranda Win PT Physical Therapist                   Goals/Plan       [facility] Name 05/02/25 1244          Bed Mobility Goal 1 (PT)    Activity/Assistive Device (Bed Mobility Goal 1, PT) sit to supine/supine to sit  -     [place] Level/Cues Needed (Bed Mobility Goal 1, PT) independent  -     Time Frame (Bed Mobility Goal 1, PT) short term goal (STG);3 days  -[facility] Name 05/02/25 1244          Transfer Goal 1 (PT)    Activity/Assistive Device (Transfer Goal 1, PT) sit-to-stand/stand-to-sit;toilet  -     [place] Level/Cues Needed (Transfer Goal 1, PT) independent  -     Time Frame (Transfer Goal 1, PT) long term goal (LTG);1 week  -        Row Name 05/02/25 1244          Gait Training Goal 1 (PT)    Activity/Assistive Device (Gait Training Goal 1, PT) gait (walking locomotion);assistive device use  -     [place] Level (Gait Training Goal 1, PT) modified independence  -     Distance (Gait Training Goal 1, PT) 150  -     Time Frame (Gait Training Goal 1, PT) long term goal (LTG);1 week  -       Row Name 05/02/25 1244          Therapy Assessment/Plan (PT)    Planned Therapy Interventions (PT) balance training;bed mobility training;gait training;home exercise program;neuromuscular re-education;patient/family education;strengthening;transfer training  -               User Key  (r) = Recorded By, (t) = Taken By, (c) = Cosigned By      Initials Name Provider Type    Miranda Win, PT Physical Therapist                   Clinical Impression       Row Name 05/02/25 1241          Pain    Pretreatment Pain Rating 8/10  -     Posttreatment Pain Rating 8/10  -     Pain Location head  -     Pain Side/Orientation right  -     Pain Management Interventions activity modification encouraged;positioning techniques utilized;exercise or physical activity utilized  -     Response to Pain Interventions activity participation with increased pain  -       Row Name 05/02/25 1241          Plan of Care Review    Plan of Care Reviewed With patient;spouse  -     Outcome Evaluation PT eval complete. Pt with deficits in balance and functional independence, exacerbated by high levels of post-op pain. Pt with notable improvement in functional mobility with use of RWx, so PT recommends RWx at discharge to decrease risk of falls at home. Pt will benefit from skilled IPPT to promote return to PLOF. Recommend home with OP PT to address further balance and functional mobility deficits.  -       Row Name 05/02/25 1241          Therapy Assessment/Plan (PT)    Patient/Family Therapy Goals Statement (PT) go home, decrease pain  -     Rehab Potential  (PT) good  -MC     Criteria for Skilled Interventions Met (PT) yes;meets criteria  -     Therapy Frequency (PT) daily  -MC     Predicted Duration of Therapy Intervention (PT) 5 days  -       Row Name 05/02/25 1241          Vital Signs    Pre Systolic BP Rehab 115  -MC     Pre Treatment Diastolic BP 79  -MC     Post Systolic BP Rehab 110  -MC     Post Treatment Diastolic BP 64  -MC     Pretreatment Heart Rate (beats/min) 82  -MC     Posttreatment Heart Rate (beats/min) 90  -MC     Pre SpO2 (%) 97  -MC     O2 Delivery Pre Treatment room air  -MC     Post SpO2 (%) 99  -MC     O2 Delivery Post Treatment room air  -MC     Pre Patient Position Sitting  -     Intra Patient Position Standing  -MC     Post Patient Position Side Lying  -       Row Name 05/02/25 1241          Positioning and Restraints    Pre-Treatment Position sitting in chair/recliner  -MC     Post Treatment Position bed  -MC     In Bed notified nsg;side lying left;call light within reach;encouraged to call for assist;exit alarm on;with family/caregiver;pillow between legs  -               User Key  (r) = Recorded By, (t) = Taken By, (c) = Cosigned By      Initials Name Provider Type     Miranda Newby, PT Physical Therapist                   Outcome Measures       Row Name 05/02/25 1245          How much help from another person do you currently need...    Turning from your back to your side while in flat bed without using bedrails? 3  -MC     Moving from lying on back to sitting on the side of a flat bed without bedrails? 3  -MC     Moving to and from a bed to a chair (including a wheelchair)? 3  -MC     Standing up from a chair using your arms (e.g., wheelchair, bedside chair)? 3  -MC     Climbing 3-5 steps with a railing? 2  -MC     To walk in hospital room? 3  -MC     AM-PAC 6 Clicks Score (PT) 17  -       Row Name 05/02/25 1158          Functional Assessment    Outcome Measure Options AM-PAC 6 Clicks Daily Activity (OT)  -SA                User Key  (r) = Recorded By, (t) = Taken By, (c) = Cosigned By      Initials Name Provider Type     Miranda Newby, PT Physical Therapist    Earl Epps OT Occupational Therapist                                 Physical Therapy Education       Title: PT OT SLP Therapies (In Progress)       Topic: Physical Therapy (In Progress)       Point: Mobility training (Done)       Learning Progress Summary            Patient Acceptance, E, VU by  at 5/2/2025 0945    Comment: PT to promote return to PLOF                      Point: Home exercise program (Not Started)       Learner Progress:  Not documented in this visit.              Point: Body mechanics (Done)       Learning Progress Summary            Patient Acceptance, E, VU by  at 5/2/2025 0945    Comment: PT to promote return to PLOF                      Point: Precautions (Done)       Learning Progress Summary            Patient Acceptance, E, VU by  at 5/2/2025 0945    Comment: PT to promote return to PLOF                                      User Key       Initials Effective Dates Name Provider Type [facility] 06/16/21 -  Miranda Newby, PT Physical Therapist PT                  PT Recommendation and Plan  Planned Therapy Interventions (PT): balance training, bed mobility training, gait training, home exercise program, neuromuscular re-education, patient/family education, strengthening, transfer training  Outcome Evaluation: PT eval complete. Pt with deficits in balance and functional independence, exacerbated by high levels of post-op pain. Pt with notable improvement in functional mobility with use of RWx, so PT recommends RWx at discharge to decrease risk of falls at home. Pt will benefit from skilled IPPT to promote return to PLOF. Recommend home with OP PT to address further balance and functional mobility deficits.     Time Calculation:   PT Evaluation Complexity  History, PT Evaluation Complexity: 1-2 personal factors  and/or comorbidities  Examination of Body Systems (PT Eval Complexity): total of 3 or more elements  Clinical Presentation (PT Evaluation Complexity): evolving  Clinical Decision Making (PT Evaluation Complexity): moderate complexity  Overall Complexity (PT Evaluation Complexity): moderate complexity     PT Charges       Row Name 05/02/25 1246             Time Calculation    Start Time 0945  -MC      PT Received On 05/02/25  -MC      PT Goal Re-Cert Due Date 05/12/25  -MC         Timed Charges    84484 - Gait Training Minutes  15  -MC         Untimed Charges    PT Eval/Re-eval Minutes 55  -MC         Total Minutes    Timed Charges Total Minutes 15  -MC      Untimed Charges Total Minutes 55  -MC       Total Minutes 70  -MC                User Key  (r) = Recorded By, (t) = Taken By, (c) = Cosigned By      Initials Name Provider Type    Miranda Win, PT Physical Therapist                      PT G-Codes  Outcome Measure Options: AM-PAC 6 Clicks Daily Activity (OT)  AM-PAC 6 Clicks Score (PT): 17  AM-PAC 6 Clicks Score (OT): 17  PT Discharge Summary  Anticipated Discharge Disposition (PT): home with outpatient therapy services    Miranda Newby PT  5/2/2025

## 2025-05-02 NOTE — PLAN OF CARE
Goal Outcome Evaluation:  Plan of Care Reviewed With: patient, spouse           Outcome Evaluation: PT eval complete. Pt with deficits in balance and functional independence, exacerbated by high levels of post-op pain. Pt with notable improvement in functional mobility with use of RWx, so PT recommends RWx at discharge to decrease risk of falls at home. Pt will benefit from skilled IPPT to promote return to PLOF. Recommend home with OP PT to address further balance and functional mobility deficits.    Anticipated Discharge Disposition (PT): home with outpatient therapy services

## 2025-05-02 NOTE — PLAN OF CARE
Goal Outcome Evaluation:           Progress: no change  Outcome Evaluation: OT evaluation complete. Pt presents with generalized weakness, impaired balance, decreased activity tolerance, and decreased independence with ADLs and fxnl mobility and would benefit from skilled OT services to improve fxnl status and return to PLOF. OT recommends home with outpatient PT. Pt would also benefit from rolling walker at discharge.    Anticipated Discharge Disposition (OT): home with outpatient therapy services

## 2025-05-02 NOTE — PROGRESS NOTES
"NEUROSURGERY PROGRESS NOTE     LOS: 1 day   Patient Care Team:  Rukhsana Hilliard DO as PCP - General (Family Medicine)  Vasiliy Leary DO as Consulting Physician (Rheumatology)  Bryanna Cazares APRN as Nurse Practitioner (Rheumatology)  Paul Caraballo MD as Surgeon (Neurosurgery)    Chief Complaint: Right facial pain.    POD#: 1 Day Post-Op  Procedures:  Right suboccipital craniectomy for trigeminal microvascular decompression.    Interval History:   Patient Complaints: Incisional pain.  Patient Denies: Significant right facial pain.    Vital Signs: Blood pressure 101/59, pulse 88, temperature 99.3 °F (37.4 °C), temperature source Oral, resp. rate 16, height 162.6 cm (64\"), weight 101 kg (223 lb), last menstrual period 04/01/2025, SpO2 95%.  Intake/Output:   Intake/Output Summary (Last 24 hours) at 5/2/2025 0601  Last data filed at 5/2/2025 0500  Gross per 24 hour   Intake 1830 ml   Output 3650 ml   Net -1820 ml       Physical Exam:  The patient awakens easily.  She appears very uncomfortable.  Facial symmetry is preserved.  Dry dressing is in place on her incision.     Data Review:    Results from last 7 days   Lab Units 05/02/25  0254   WBC 10*3/mm3 14.05*   HEMOGLOBIN g/dL 13.2   HEMATOCRIT % 38.6   PLATELETS 10*3/mm3 233     Results from last 7 days   Lab Units 05/02/25  0254   SODIUM mmol/L 138   POTASSIUM mmol/L 4.0   CHLORIDE mmol/L 106   CO2 mmol/L 19.0*   BUN mg/dL 6   CREATININE mg/dL 0.65   GLUCOSE mg/dL 135*   CALCIUM mg/dL 8.5*     CT of the head this morning demonstrates expected postoperative changes.    Assessment/Plan:  1.  Right trigeminal neuralgia status post microvascular decompression.  2.  Bipolar disorder.  3.  Obesity.  4.  Disposition: Mobilize patient.      Paul Caraballo MD  05/02/25  06:01 EDT    "

## 2025-05-03 PROCEDURE — 99024 POSTOP FOLLOW-UP VISIT: CPT | Performed by: NEUROLOGICAL SURGERY

## 2025-05-03 RX ORDER — OXYCODONE AND ACETAMINOPHEN 5; 325 MG/1; MG/1
1 TABLET ORAL 3 TIMES DAILY PRN
Qty: 12 TABLET | Refills: 0 | Status: SHIPPED | OUTPATIENT
Start: 2025-05-03 | End: 2025-05-07 | Stop reason: SDUPTHER

## 2025-05-03 RX ADMIN — HYDROCODONE BITARTRATE AND ACETAMINOPHEN 1 TABLET: 5; 325 TABLET ORAL at 20:46

## 2025-05-03 RX ADMIN — OXYCODONE HYDROCHLORIDE AND ACETAMINOPHEN 1 TABLET: 7.5; 325 TABLET ORAL at 16:52

## 2025-05-03 RX ADMIN — ZIPRASIDONE HYDROCHLORIDE 60 MG: 20 CAPSULE ORAL at 21:20

## 2025-05-03 RX ADMIN — CARBAMAZEPINE 200 MG: 200 TABLET ORAL at 21:19

## 2025-05-03 RX ADMIN — SENNOSIDES AND DOCUSATE SODIUM 2 TABLET: 50; 8.6 TABLET ORAL at 20:45

## 2025-05-03 RX ADMIN — HYDROCODONE BITARTRATE AND ACETAMINOPHEN 1 TABLET: 5; 325 TABLET ORAL at 13:04

## 2025-05-03 RX ADMIN — CLONAZEPAM 1 MG: 1 TABLET ORAL at 08:41

## 2025-05-03 RX ADMIN — OXYCODONE HYDROCHLORIDE AND ACETAMINOPHEN 1 TABLET: 7.5; 325 TABLET ORAL at 01:59

## 2025-05-03 RX ADMIN — LAMOTRIGINE 200 MG: 100 TABLET ORAL at 08:40

## 2025-05-03 RX ADMIN — OXYCODONE HYDROCHLORIDE AND ACETAMINOPHEN 1 TABLET: 7.5; 325 TABLET ORAL at 06:25

## 2025-05-03 RX ADMIN — PREGABALIN 150 MG: 75 CAPSULE ORAL at 08:41

## 2025-05-03 RX ADMIN — OXYCODONE HYDROCHLORIDE AND ACETAMINOPHEN 1 TABLET: 7.5; 325 TABLET ORAL at 22:03

## 2025-05-03 RX ADMIN — SENNOSIDES AND DOCUSATE SODIUM 2 TABLET: 50; 8.6 TABLET ORAL at 08:41

## 2025-05-03 RX ADMIN — CLONAZEPAM 1 MG: 1 TABLET ORAL at 21:19

## 2025-05-03 RX ADMIN — CARBAMAZEPINE 200 MG: 200 TABLET ORAL at 15:42

## 2025-05-03 RX ADMIN — PREGABALIN 150 MG: 75 CAPSULE ORAL at 15:42

## 2025-05-03 RX ADMIN — PREGABALIN 150 MG: 75 CAPSULE ORAL at 20:45

## 2025-05-03 RX ADMIN — CARBAMAZEPINE 200 MG: 200 TABLET ORAL at 08:40

## 2025-05-03 RX ADMIN — ZIPRASIDONE HYDROCHLORIDE 40 MG: 20 CAPSULE ORAL at 08:40

## 2025-05-03 NOTE — PLAN OF CARE
Goal Outcome Evaluation:  Plan of Care Reviewed With: patient           Outcome Evaluation: Pt is alert and oriented x4. VSS on room air. Ambulated in room with standby assist. Pain controlled well with PO PRNs.

## 2025-05-03 NOTE — PROGRESS NOTES
"NEUROSURGERY PROGRESS NOTE     LOS: 2 days   Patient Care Team:  Rukhsana Hilliard DO as PCP - General (Family Medicine)  Vasiliy Leary DO as Consulting Physician (Rheumatology)  Bryanna Cazares APRN as Nurse Practitioner (Rheumatology)  Paul Caraballo MD as Surgeon (Neurosurgery)    Chief Complaint: Right facial pain.    POD#: 2 Days Post-Op  Procedures:  Right suboccipital craniectomy for trigeminal microvascular decompression.    Interval History:   Patient Complaints: Incisional pain.  Patient Denies: Significant right facial pain.    Vital Signs: Blood pressure 124/84, pulse 95, temperature 99.4 °F (37.4 °C), temperature source Oral, resp. rate 14, height 162.6 cm (64\"), weight 101 kg (223 lb), last menstrual period 04/01/2025, SpO2 95%.  Intake/Output:   Intake/Output Summary (Last 24 hours) at 5/3/2025 0822  Last data filed at 5/3/2025 0200  Gross per 24 hour   Intake 650 ml   Output --   Net 650 ml       Physical Exam:  The patient is awake and in good spirits.  She is smiling and feels better.  Dry dressing is in place on her incision.     Assessment/Plan:  1.  Right trigeminal neuralgia status post microvascular decompression.  2.  Bipolar disorder.  3.  Obesity.  4.  Disposition: Mobilize patient.  To floor.  Home tomorrow if doing well.  Rx and discharge orders placed.      Paul Caraballo MD  05/03/25  08:22 EDT    "

## 2025-05-04 ENCOUNTER — READMISSION MANAGEMENT (OUTPATIENT)
Dept: CALL CENTER | Facility: HOSPITAL | Age: 35
End: 2025-05-04
Payer: COMMERCIAL

## 2025-05-04 VITALS
OXYGEN SATURATION: 93 % | WEIGHT: 223 LBS | RESPIRATION RATE: 18 BRPM | HEART RATE: 104 BPM | DIASTOLIC BLOOD PRESSURE: 80 MMHG | BODY MASS INDEX: 38.07 KG/M2 | HEIGHT: 64 IN | TEMPERATURE: 99.2 F | SYSTOLIC BLOOD PRESSURE: 113 MMHG

## 2025-05-04 PROCEDURE — 99024 POSTOP FOLLOW-UP VISIT: CPT

## 2025-05-04 RX ORDER — AMOXICILLIN 250 MG
2 CAPSULE ORAL 2 TIMES DAILY
Qty: 30 TABLET | Refills: 0 | Status: SHIPPED | OUTPATIENT
Start: 2025-05-04

## 2025-05-04 RX ORDER — POLYETHYLENE GLYCOL 3350 17 G/17G
17 POWDER, FOR SOLUTION ORAL DAILY PRN
Qty: 510 G | Refills: 0 | Status: SHIPPED | OUTPATIENT
Start: 2025-05-04

## 2025-05-04 RX ADMIN — OXYCODONE HYDROCHLORIDE AND ACETAMINOPHEN 1 TABLET: 7.5; 325 TABLET ORAL at 07:29

## 2025-05-04 RX ADMIN — PREGABALIN 150 MG: 75 CAPSULE ORAL at 09:00

## 2025-05-04 RX ADMIN — CARBAMAZEPINE 200 MG: 200 TABLET ORAL at 09:00

## 2025-05-04 RX ADMIN — LAMOTRIGINE 200 MG: 100 TABLET ORAL at 09:00

## 2025-05-04 RX ADMIN — CLONAZEPAM 1 MG: 1 TABLET ORAL at 09:00

## 2025-05-04 RX ADMIN — SENNOSIDES AND DOCUSATE SODIUM 2 TABLET: 50; 8.6 TABLET ORAL at 09:00

## 2025-05-04 RX ADMIN — ZIPRASIDONE HYDROCHLORIDE 40 MG: 20 CAPSULE ORAL at 08:59

## 2025-05-04 NOTE — PROGRESS NOTES
Chief complaint: S/p right suboccipital craniectomy for trigeminal microvascular decompression     Admit Diagnosis:   Trigeminal neuralgia [G50.0]     Subjective: No events overnight. Patient is pleased with surgical outcome and ready for discharge.     Objective:    Vitals:    25 0317   BP: 124/80   Pulse:    Resp: 18   Temp: 98.3 °F (36.8 °C)   SpO2:      Pulse  Av.3  Min: 91  Max: 108  Systolic (24hrs), Av , Min:101 , Max:139     Diastolic (24hrs), Av, Min:64, Max:92    Temp (24hrs), Av.2 °F (37.3 °C), Min:98.3 °F (36.8 °C), Max:99.9 °F (37.7 °C)      Awake, alert, oriented. Sitting up in bed and eating breakfast.  is at the bedside. States that she is feeling well this morning. Incision is dry, intact, and healing as expected. Face symmetric. Pupils equal, round, and reactive to light.     Lab Results   Component Value Date     2025       A/P:   Admit Diagnosis:   Trigeminal neuralgia [G50.0]     Patient is s/p right suboccipital craniectomy for trigeminal microvascular decompression. She is doing well and feels ready to discharge home today. I discussed signs and symptoms that would warrant further urgent/emergent workup or for her to reach out to our office. All questions answered prior to discharge.     Kerry Lopez PA-C

## 2025-05-04 NOTE — OUTREACH NOTE
Prep Survey      Flowsheet Row Responses   [facility] patient discharged from? [facility]   Is LACE score < 7 ? Yes   Eligibility Psychiatric   Date of Admission 05/01/25   Date of Discharge 05/04/25   Discharge Disposition Home or Self Care   Discharge diagnosis CRANIOTOMY SUBOCCIPITAL   Does the patient have one of the following disease processes/diagnoses(primary or secondary)? General Surgery   Does the patient have Home health ordered? No   Is there a DME ordered? No   Prep survey completed? Yes            ELDER ALLISON - Registered Nurse

## 2025-05-04 NOTE — PLAN OF CARE
Oriented x 4, mood / affect appropriate. C/o constant posterior HA, requires around clock PO analgesics. Border dressing remains CDI. Patient ambulated in hallway with SB assist. VSS on RA. Call light in reach. Spouse at bedside.

## 2025-05-04 NOTE — DISCHARGE SUMMARY
[facility]    Date of Admission: 5/1/2025  Date of Discharge:  5/4/2025    Discharge Diagnosis:   Right Trigeminal neuralgia    Procedures Performed  Procedure(s):  CRANIOTOMY SUBOCCIPITAL       Presenting Problem/History of Present Illness  Trigeminal neuralgia [G50.0]     Hospital Course  Patient is a 34 y.o. female who underwent right suboccipital craniectomy for trigeminal microvascular decompression with Dr. Caraballo on 5/1/25. She spent two nights in the neuro ICU for close monitoring and was transferred to the floor yesterday. She was seen and examined this morning prior to discharge. She is ready to discharge today. She will go home with Percocet for breakthrough pain, narcan, and a bowel care regime. She will need to follow up in the office in 10 days for a postoperative wound check with a PA-C, or sooner if clinically indicated. I carefully reviewed signs and symptoms that would warrant further urgent/emergent workup or for her to reach out to our office. All questions answered at the bedside this morning prior to discharge.     Condition on Discharge:  satisfactory    Discharge Disposition  Home or Self Care    Discharge Medications     Discharge Medications        New Medications        Instructions Start Date   naloxone 4 MG/0.1ML nasal spray  Commonly known as: NARCAN   Call 911. Don't prime. [place] in 1 nostril for overdose. Repeat in 2-3 minutes in other nostril if no or minimal breathing/responsiveness.      oxyCODONE-acetaminophen 5-325 MG per tablet  Commonly known as: PERCOCET   1 tablet, Oral, 3 Times Daily PRN      polyethylene glycol 17 g packet  Commonly known as: MIRALAX   17 g, Oral, Daily PRN      sennosides-docusate 8.6-50 MG per tablet  Commonly known as: PERICOLACE   2 tablets, Oral, 2 Times Daily             Continue These Medications        Instructions Start Date   carBAMazepine 200 MG tablet  Commonly known as: TEGretol   1  tablet, 3 times daily      clonazePAM 1 MG tablet  Commonly known as: KlonoPIN   1 mg, Oral, 2 Times Daily      Humira (2 Pen) 40 MG/0.4ML Pen-injector Kit  Generic drug: Adalimumab   INJECT 1 PEN UNDER THE SKIN EVERY 14 DAYS.      lamoTRIgine 200 MG tablet  Commonly known as: LaMICtal   200 mg, Oral, Daily      pregabalin 150 MG capsule  Commonly known as: LYRICA   1 capsule, Oral, 3 times daily      traMADol 50 MG tablet  Commonly known as: ULTRAM   1 tablet, Oral, 3 times daily      ziprasidone 60 MG capsule  Commonly known as: Geodon   60 mg, Oral, Nightly      ziprasidone 40 MG capsule  Commonly known as: GEODON   40 mg, Oral, Every Morning             Stop These Medications      HYDROcodone-acetaminophen 5-325 MG per tablet  Commonly known as: Norco              Follow-up Appointments  Future Appointments   Date Time Provider Department Center   5/30/2025  4:30 PM [facility]avez [facility]N [facility]   6/11/2025  4:30 PM Vasiliy Leary DO [facility]   10/14/2025  3:00 PM Santy Estrella MD [facility]     Additional Instructions for the Follow-ups that You Need to Schedule       Ambulatory Referral to Occupational Therapy for Evaluation & Treatment   As directed      S/p Right suboccipital craniectomy for trigeminal microvascular decompression.    Order Comments: S/p Right suboccipital craniectomy for trigeminal microvascular decompression.    Specialty needed: Evaluate and treat Neuro   Follow-up needed: Yes        Ambulatory Referral to Physical Therapy for Evaluation & Treatment   As directed      S/p Right suboccipital craniectomy for trigeminal microvascular decompression.    Order Comments: S/p Right suboccipital craniectomy for trigeminal microvascular decompression.    Specialty needed: Evaluate and treat POST OP Neuro   Exercises: Strengthening   Weight Bearing Status: Full weight bearing   Follow-up needed: Yes        Discharge Follow-up with Specified Provider: [facility]  Neurosurgery with Dr. Caraballo; 2 Weeks   As directed      To: [facility] Neurosurgery with Dr. Caraballo   Follow Up: 2 Weeks        Discharge Follow-up with Specified Provider: Ilya; 2 Weeks   As directed      To: Ilya   Follow Up: 2 Weeks   Follow Up Details: Follow-up with physician's assistant in approximately 10 days for suture removal and/or wound check.                Referring Provider  Paul Caraballo MD    PCP   Rukhsana Hilliard DO Elisa Mauldin, PA-C  05/04/25  07:44 EDT

## 2025-05-05 ENCOUNTER — TRANSITIONAL CARE MANAGEMENT TELEPHONE ENCOUNTER (OUTPATIENT)
Dept: CALL CENTER | Facility: HOSPITAL | Age: 35
End: 2025-05-05
Payer: COMMERCIAL

## 2025-05-05 DIAGNOSIS — G50.0 TRIGEMINAL NEURALGIA: ICD-10-CM

## 2025-05-05 NOTE — OUTREACH NOTE
Call Center TCM Note      Flowsheet Row Responses   [facility] patient discharged from? [facility]   Does the patient have one of the following disease processes/diagnoses(primary or secondary)? General Surgery   TCM attempt successful? Yes   Call start time 1447   Call end time 1448   Discharge diagnosis CRANIOTOMY SUBOCCIPITAL   Meds reviewed with patient/caregiver? Yes   Is the patient having any side effects they believe may be caused by any medication additions or changes? No   Does the patient have all medications related to this admission filled (includes all antibiotics, pain medications, etc.) Yes   Is the patient taking all medications as directed (includes completed medication regime)? Yes   Does the patient have an appointment with their PCP within 7-14 days of discharge? No   Nursing Interventions Patient declined scheduling/rescheduling appointment at this time, Routed TCM call to PCP office   Has home health visited the patient within 72 hours of discharge? N/A   What DME was ordered? [facility]   Has all DME been delivered? Yes   Did the patient receive a copy of their discharge instructions? Yes   Nursing interventions Reviewed instructions with patient   What is the patient's perception of their health status since discharge? Improving   Nursing interventions Nurse provided patient education   Is the patient /caregiver able to teach back basic post-op care? Keep incision areas clean,dry and protected, No tub bath, swimming, or hot tub until instructed by MD, Take showers only when approved by MD-sponge bathe until then, Practice 'cough and deep breath'   Is the patient/caregiver able to teach back signs and symptoms of incisional infection? Increased redness, swelling or pain at the incisonal site, Increased drainage or bleeding, Incisional warmth, Pus or odor from incision, Fever   Is the patient/caregiver able to teach back steps to recovery at home? Set small, achievable goals for return to  baseline health, Rest and rebuild strength, gradually increase activity, Practice good oral hygiene, Eat a well-balance diet, Make a list of questions for surgeon's appointment   If the patient is a current smoker, are they able to teach back resources for cessation? Not a smoker   Is the patient/caregiver able to teach back the hierarchy of who to call/visit for symptoms/problems? PCP, Specialist, Home health nurse, Urgent Care, ED, 911 Yes   TCM call completed? Yes   Call end time 1448            Catherine H - Registered Nurse    5/5/2025, 14:49 EDT

## 2025-05-05 NOTE — TELEPHONE ENCOUNTER
Provider:  Ilya  Surgery/Procedure:  Craniotomy suboccipital right  Surgery/Procedure Date:  5/1/25  Last visit:   4/2/25  Next visit: 5/13/25     Reason for call:  Patient reports she is still in a good deal of incisional pain, rating it at a 7 to 8/10. She reports she was taking percocet 7.5 mg every 4 hours, and hydrocodone every 4 hours inpatient, the sudden change to percocet 5 mg every 8 hours has made her pain much worse. She is supplementing with Tylenol between doses and that has helped a small amount.

## 2025-05-06 NOTE — PAYOR COMM NOTE
"Ref# [id number]   Discharge Summary    SHAR Beckman, RN  Utilization Review  Phone [phone]  Fax [phone]    [place] [address]         Corrine Puentes (34 y.o. Female)       Date of Birth   1990    Social Security Number       Address   [address] [facility] [address]    Home Phone   [phone]    MRN   [id number]       Oriental orthodox   [employer]    Marital Status                               Admission Date   5/1/2025    Admission Type   Elective    Admitting Provider   Paul Caraballo MD    Attending Provider       Department, Room/Bed   [facility] 3G, S358/1       Discharge Date   5/4/2025    Discharge Disposition   Home or Self Care    Discharge Destination   Home                              Attending Provider: (none)   Allergies: Imitrex [Sumatriptan]    Isolation: None   Infection: None   Code Status: Prior    Ht: 162.6 cm (64\")   Wt: 101 kg (223 lb)    Admission Cmt: None   Principal Problem: Trigeminal neuralgia [G50.0]                   Active Insurance as of 5/1/2025       Primary Coverage       Payor Plan Insurance Group Employer/Plan Group    [facility] BLUE CROSS [facility] [id number]       Payor Plan Address Payor Plan Phone Number Payor Plan Fax Number Effective Dates    [address]   1/1/2025 - None Entered    [place] [address]         Subscriber Name Subscriber Birth Date Member ID       SHANDA PUENTES 1990 [id number]                     Emergency Contacts        (Rel.) Home Phone Work Phone Mobile Phone    SHANDA PUENTES (Spouse) [phone] -- [phone]                 Discharge Summary        Kerry Lopez PA-C at 05/04/25 0754       Attestation signed by Paul Caraballo MD at 05/05/25 0628      I have reviewed this documentation and agree.                          [facility]    Date of Admission: " 5/1/2025  Date of Discharge:  5/4/2025    Discharge Diagnosis:   Right Trigeminal neuralgia    Procedures Performed  Procedure(s):  CRANIOTOMY SUBOCCIPITAL       Presenting Problem/History of Present Illness  Trigeminal neuralgia [G50.0]     Hospital Course  Patient is a 34 y.o. female who underwent right suboccipital craniectomy for trigeminal microvascular decompression with Dr. Caraballo on 5/1/25. She spent two nights in the neuro ICU for close monitoring and was transferred to the floor yesterday. She was seen and examined this morning prior to discharge. She is ready to discharge today. She will go home with Percocet for breakthrough pain, narcan, and a bowel care regime. She will need to follow up in the office in 10 days for a postoperative wound check with a PA-C, or sooner if clinically indicated. I carefully reviewed signs and symptoms that would warrant further urgent/emergent workup or for her to reach out to our office. All questions answered at the bedside this morning prior to discharge.     Condition on Discharge:  satisfactory    Discharge Disposition  Home or Self Care    Discharge Medications     Discharge Medications        New Medications        Instructions Start Date   naloxone 4 MG/0.1ML nasal spray  Commonly known as: NARCAN   Call 911. Don't prime. [place] in 1 nostril for overdose. Repeat in 2-3 minutes in other nostril if no or minimal breathing/responsiveness.      oxyCODONE-acetaminophen 5-325 MG per tablet  Commonly known as: PERCOCET   1 tablet, Oral, 3 Times Daily PRN      polyethylene glycol 17 g packet  Commonly known as: MIRALAX   17 g, Oral, Daily PRN      sennosides-docusate 8.6-50 MG per tablet  Commonly known as: PERICOLACE   2 tablets, Oral, 2 Times Daily             Continue These Medications        Instructions Start Date   carBAMazepine 200 MG tablet  Commonly known as: TEGretol   1 tablet, 3 times daily      clonazePAM 1 MG tablet  Commonly known as: KlonoPIN   1 mg, Oral, 2  Times Daily      Humira (2 Pen) 40 MG/0.4ML Pen-injector Kit  Generic drug: Adalimumab   INJECT 1 PEN UNDER THE SKIN EVERY 14 DAYS.      lamoTRIgine 200 MG tablet  Commonly known as: LaMICtal   200 mg, Oral, Daily      pregabalin 150 MG capsule  Commonly known as: LYRICA   1 capsule, Oral, 3 times daily      traMADol 50 MG tablet  Commonly known as: ULTRAM   1 tablet, Oral, 3 times daily      ziprasidone 60 MG capsule  Commonly known as: Geodon   60 mg, Oral, Nightly      ziprasidone 40 MG capsule  Commonly known as: GEODON   40 mg, Oral, Every Morning             Stop These Medications      HYDROcodone-acetaminophen 5-325 MG per tablet  Commonly known as: Norco              Follow-up Appointments  Future Appointments   Date Time Provider Department Center   5/30/2025  4:30 PM Justin Chavez APRN [facility]   6/11/2025  4:30 PM Vasiliy Leary DO [facility]   10/14/2025  3:00 PM Santy Estrella MD [facility]     Additional Instructions for the Follow-ups that You Need to Schedule       Ambulatory Referral to Occupational Therapy for Evaluation & Treatment   As directed      S/p Right suboccipital craniectomy for trigeminal microvascular decompression.    Order Comments: S/p Right suboccipital craniectomy for trigeminal microvascular decompression.    Specialty needed: Evaluate and treat Neuro   Follow-up needed: Yes        Ambulatory Referral to Physical Therapy for Evaluation & Treatment   As directed      S/p Right suboccipital craniectomy for trigeminal microvascular decompression.    Order Comments: S/p Right suboccipital craniectomy for trigeminal microvascular decompression.    Specialty needed: Evaluate and treat POST OP Neuro   Exercises: Strengthening   Weight Bearing Status: Full weight bearing   Follow-up needed: Yes        Discharge Follow-up with Specified Provider: [facility] with Dr. Caraballo; 2 Weeks   As directed      To: [facility] with Dr. Caraballo    Follow Up: 2 Weeks        Discharge Follow-up with Specified Provider: Ilya; 2 Weeks   As directed      To: Ilya   Follow Up: 2 Weeks   Follow Up Details: Follow-up with physician's assistant in approximately 10 days for suture removal and/or wound check.                Referring Provider  Paul Caraballo MD    PCP   Rukhsana Hilliard DO Elisa Mauldin, PA-C  05/04/25  07:44 EDT              Electronically signed by Paul Caraballo MD at 05/05/25 0628       Discharge Order (From admission, onward)       Start     Ordered    05/03/25 0830  Discharge patient  Once        Comments: Hold midday on Sunday if okay with rounding MD/RIP   Expected Discharge Date: 05/04/25   Expected Discharge Time: Midday   Discharge Disposition: Home or Self Care   Physician of Record for Attribution - Please select from Treatment Team: PAUL CARABALLO [[id number]]   Review needed by CMO to determine Physician of Record: No      Question Answer Comment   Physician of Record for Attribution - Please select from Treatment Team PAUL CARABALLO    Review needed by CMO to determine Physician of Record No        05/03/25 0835

## 2025-05-07 ENCOUNTER — TELEPHONE (OUTPATIENT)
Dept: NEUROSURGERY | Facility: CLINIC | Age: 35
End: 2025-05-07
Payer: COMMERCIAL

## 2025-05-07 RX ORDER — OXYCODONE AND ACETAMINOPHEN 5; 325 MG/1; MG/1
1 TABLET ORAL EVERY 6 HOURS PRN
Qty: 20 TABLET | Refills: 0 | Status: SHIPPED | OUTPATIENT
Start: 2025-05-07

## 2025-05-07 NOTE — TELEPHONE ENCOUNTER
Patient is running low and requesting refill of Percocet 5 mg.     Son:    1 05/04/2025 [id number] Oxycodone/Acetaminophen   325MG/5MG  Paul Caraballo [facility]   RETAIL PHARMACY  12.00 4 23 04 [facility] 05/03/2025 [facility]  1 04/09/2025 [id number] Hydrocodone Bitartrate/Ac   325MG/5MG  [facility]  30.00 7 21 02 [facility] 04/09/2025 [place] [address] 04/03/2025 [id number] Pregabalin 150MG [facility]   [facility]  90.00 30 02 [facility] 04/03/2025 [facility]

## 2025-05-07 NOTE — TELEPHONE ENCOUNTER
Caller: RAKEL PUENTES    Relationship: SELF     Best call back number: [phone]    Requested Prescriptions:   OXYCODONE     Pharmacy where request should be sent:   [facility]/pharmacy #[id number] [place], [place] - [address] [place] [address] AT [facility] [address] - [phone]      Last office visit with prescribing clinician: 4/2/2025   Last telemedicine visit with prescribing clinician: Visit date not found   Next office visit with prescribing clinician: Visit date not found     Additional details provided by patient: PT STATES SHE HAS CALLED AND LEFT A MESSAGE WITH HER NURSE BEFORE CALLING OUR OFFICE - THERE IS AN ORDER PENDING FOR MEDICATION  - PT STATES SHE WANTED TO LEAVE AN ADDITIONAL MESSAGE NOTING SHE HAS LESS THAN A 3 DAY SUPPLY (3 TABLETS REMAINING) AND WILL BE OUT OF MEDICATION TOMORROW     Does the patient have less than a 3 day supply:  [x] Yes  [] No    Would you like a call back once the refill request has been completed: [] Yes [x] No    If the office needs to give you a call back, can they leave a voicemail: [x] Yes [] No    Helena Swain Rep   05/07/25 11:32 EDT     
Medication already pending for refill. Closing encounter.  
Stable

## 2025-05-13 ENCOUNTER — OFFICE VISIT (OUTPATIENT)
Dept: NEUROSURGERY | Facility: CLINIC | Age: 35
End: 2025-05-13
Payer: COMMERCIAL

## 2025-05-13 VITALS — HEIGHT: 64 IN | WEIGHT: 224.6 LBS | BODY MASS INDEX: 38.35 KG/M2 | TEMPERATURE: 97.6 F

## 2025-05-13 DIAGNOSIS — G50.0 TRIGEMINAL NEURALGIA OF RIGHT SIDE OF FACE: Primary | ICD-10-CM

## 2025-05-13 PROCEDURE — 99024 POSTOP FOLLOW-UP VISIT: CPT

## 2025-05-13 NOTE — PROGRESS NOTES
Patient: Corrine Eaton  : 1990  Chart #: [id number]    Date of Service: 2025    Chief Complaint: Post-op; trigeminal neuralgia     HPI: Ms. Eaton is a 34-year-old woman that presented with a protracted course of severe right facial pain felt to a syndrome of trigeminal neuralgia. She failed numerous non-operative measures and ultimately underwent microvascular decompression of the right trigeminal nerve on 2025. Surgery was without overt complications and she discharged home after an uneventful hospital stay on POD #3. Today, she presents for a post-operative incision check and suture removal. She primarily endorses a tightness near her incision. She had one episode of facial pain when there was a lapse in her Lyrica prescription from the pharmacy that she has been able to resume. She denies fever, chills, or drainage from her incision.     Review of Systems   Constitutional:  Negative for activity change, appetite change, chills, diaphoresis, fatigue, fever and unexpected weight change.   HENT:  Negative for congestion, dental problem, drooling, ear discharge, ear pain, facial swelling, hearing loss, mouth sores, nosebleeds, postnasal drip, rhinorrhea, sinus pressure, sinus pain, sneezing, sore throat, tinnitus, trouble swallowing and voice change.    Eyes:  Negative for photophobia, pain, discharge, redness, itching and visual disturbance.   Respiratory:  Negative for apnea, cough, choking, chest tightness, shortness of breath, wheezing and stridor.    Cardiovascular:  Negative for chest pain, palpitations and leg swelling.   Gastrointestinal:  Negative for abdominal distention, abdominal pain, anal bleeding, blood in stool, constipation, diarrhea, nausea, rectal pain and vomiting.   Endocrine: Negative for cold intolerance, heat intolerance, polydipsia, polyphagia and polyuria.   Genitourinary:  Negative for decreased urine volume, difficulty urinating, dyspareunia, dysuria, enuresis,  "flank pain, frequency, genital sores, hematuria, menstrual problem, pelvic pain, urgency, vaginal bleeding, vaginal discharge and vaginal pain.   Musculoskeletal:  Negative for arthralgias, back pain, gait problem, joint swelling, myalgias, neck pain and neck stiffness.   Skin:  Negative for color change, pallor, rash and wound.   Allergic/Immunologic: Negative for environmental allergies, food allergies and immunocompromised state.   Neurological:  Negative for dizziness, tremors, seizures, syncope, facial asymmetry, speech difficulty, weakness, light-headedness, numbness and headaches.   Hematological:  Negative for adenopathy. Does not bruise/bleed easily.   Psychiatric/Behavioral:  Negative for agitation, behavioral problems, confusion, decreased concentration, dysphoric mood, hallucinations, self-injury, sleep disturbance and suicidal ideas. The patient is not nervous/anxious and is not hyperactive.    All other systems reviewed and are negative.       The patient's past medical history, past surgical history, family history, and social history have been been reviewed at length in the electronic medical record.     Physical examination:  Temperature 97.6 °F (36.4 °C), temperature source Temporal, height 162.6 cm (64\"), weight 102 kg (224 lb 9.6 oz), last menstrual period 04/01/2025.  Right neck incision is clean, dry and in good process of healing. I prepped the site with isopropyl alcohol and removed nylon sutures. Incision remains intact with mild eschar and erythema.     Constitutional: The patient is well-developed, well-nourished. She is in no acute distress.     Neurological Exam   A/A/C, speech clear, attention normal, conversant, answers questions appropriately, good historian.  Cranial nerves II through XII are intact.  Sensation is intact.  Gait is normal, balance is normal.   No tremors are noted.    Medical Decision Making  Diagnoses and all orders for this visit:    1. Trigeminal neuralgia of right " side of face (Primary)    Ms. Eaton is doing well.  We reviewed activity she is going to slowly escalate.  She may lie flat.  She is still taking a small amount of Percocet as needed.  I have advised her to not drive if she has taken 1 of those within the last 24-48 hours.  We reviewed signs of infection that she should make our office aware of.  She will follow-up with Dr. Caraballo in 6 weeks to assess progress.    Any copied data from previous notes included in the (1) HPI, (2) PE, (3) MDM and/or assessment and plan has been reviewed and is accurate as of this day.    Snow Rondon PA-C     Patient Care Team:  Rukhsana Hilliard DO as PCP - General (Family Medicine)  Vasiliy Leary DO as Consulting Physician (Rheumatology)  Bryanna Cazares APRN as Nurse Practitioner (Rheumatology)  Paul Caraballo MD as Surgeon (Neurosurgery)    Answers submitted by the patient for this visit:  Post Operative Visit (Submitted on 5/13/2025)  Chief Complaint: Follow-up  Pain Control: well controlled  Fever: no fever  Diet: adequate intake  Activity: normal  Operative Site Issues: No

## 2025-05-23 ENCOUNTER — TELEPHONE (OUTPATIENT)
Dept: NEUROSURGERY | Facility: CLINIC | Age: 35
End: 2025-05-23
Payer: COMMERCIAL

## 2025-05-23 NOTE — TELEPHONE ENCOUNTER
Caller: RAKEL    Relationship: SELF    Best call back number: [phone]    What is the best time to reach you: ANYTIME    Who are you requesting to speak with (clinical staff, provider,  specific staff member): CLINICAL    Do you know the name of the person who called:     What was the call regarding: PATIENT CALLED STATING SHE WAS TOLD TO STOP TAKING HER HUMIRA MEDICATION 2 WKS BEFORE HER SX W DR MUSTAFA ON 05.01.25 - STATES SHE STOPPED TAKING MEDICATION ON 04.12.25 - BUT HAS NOT BEEN TOLD WHEN SHE COULD START TAKING THIS MEDICATION AGAIN    PLEASE CALL AND ADVISE  THANK YOU    Is it okay if the provider responds through MyChart: YES

## 2025-05-30 ENCOUNTER — OFFICE VISIT (OUTPATIENT)
Dept: BEHAVIORAL HEALTH | Facility: CLINIC | Age: 35
End: 2025-05-30
Payer: COMMERCIAL

## 2025-05-30 VITALS
HEART RATE: 80 BPM | HEIGHT: 64 IN | WEIGHT: 224 LBS | BODY MASS INDEX: 38.24 KG/M2 | DIASTOLIC BLOOD PRESSURE: 89 MMHG | SYSTOLIC BLOOD PRESSURE: 120 MMHG

## 2025-05-30 DIAGNOSIS — F31.76 BIPOLAR DISORDER, IN FULL REMISSION, MOST RECENT EPISODE DEPRESSED: ICD-10-CM

## 2025-05-30 DIAGNOSIS — R51.9 CHRONIC IDIOPATHIC FACIAL PAIN: ICD-10-CM

## 2025-05-30 DIAGNOSIS — G89.29 CHRONIC IDIOPATHIC FACIAL PAIN: ICD-10-CM

## 2025-05-30 DIAGNOSIS — G50.0 TRIGEMINAL NEURALGIA: ICD-10-CM

## 2025-05-30 RX ORDER — LAMOTRIGINE 200 MG/1
200 TABLET ORAL DAILY
Qty: 90 TABLET | Refills: 1 | Status: SHIPPED | OUTPATIENT
Start: 2025-05-30

## 2025-05-30 RX ORDER — ZIPRASIDONE HYDROCHLORIDE 60 MG/1
60 CAPSULE ORAL NIGHTLY
Qty: 30 CAPSULE | Refills: 2 | Status: SHIPPED | OUTPATIENT
Start: 2025-05-30

## 2025-05-30 RX ORDER — CLONAZEPAM 0.5 MG/1
0.5 TABLET ORAL 2 TIMES DAILY PRN
Qty: 60 TABLET | Refills: 0 | Status: SHIPPED | OUTPATIENT
Start: 2025-05-30

## 2025-05-30 RX ORDER — ZIPRASIDONE HYDROCHLORIDE 40 MG/1
40 CAPSULE ORAL EVERY MORNING
Qty: 30 CAPSULE | Refills: 2 | Status: SHIPPED | OUTPATIENT
Start: 2025-05-30

## 2025-05-30 NOTE — PROGRESS NOTES
Office  Follow Up Visit      Patient Name: Corrine Eaton  : 1990   MRN: [id number]     Referring Provider: Rukhsana Hilliard DO    Chief Complaint: Medication follow-up    ICD-10-CM ICD-9-CM   1. Bipolar disorder, in full remission, most recent episode depressed  F31.76 296.56   2. Chronic idiopathic facial pain  R51.9 784.0    G89.29 338.29   3. Trigeminal neuralgia  G50.0 350.1        History of Present Illness:   Corrine Eaton is a 34 y.o. female who is here today for follow up related to medication management of bipolar disorder, chronic facial pain, trigeminal neuralgia.  Medication regimen includes lamotrigine 200 mg daily, Klonopin 1 mg twice daily, and ziprasidone 40 mg in the morning and 60 mg nightly.    Patient reports things been going really well and reports continued bipolar symptoms in remission.  Patient also underwent suboccipital craniotomy per Dr. Caraballo on 2025 for microvascular decompression.  Patient reports since the procedure her pain has completely resolved.  She reports that they have discontinued her Tegretol.  Patient reports that she has been very happy and is now able to engage with her children and not be in pain anymore.      Subjective      Review of Systems:   Review of Systems   Constitutional: Negative.    Respiratory: Negative.     Cardiovascular: Negative.    Gastrointestinal: Negative.    Genitourinary: Negative.    Musculoskeletal: Negative.    Neurological: Negative.    Psychiatric/Behavioral: Negative.         Patient History:   The following portions of the patient's history were reviewed and updated as appropriate: allergies, current medications, past family history, past medical history, past social history, past surgical history and problem list.     Social:  Suboccipital craniotomy on 2025.    Medications:     Current Outpatient Medications:     clonazePAM (KlonoPIN) 0.5 MG tablet, Take 1 tablet by mouth 2 (Two) Times a Day As Needed for  "Anxiety., Disp: 60 tablet, Rfl: 0    lamoTRIgine (LaMICtal) 200 MG tablet, Take 1 tablet by mouth Daily., Disp: 90 tablet, Rfl: 1    ziprasidone (GEODON) 40 MG capsule, Take 1 capsule by mouth Every Morning., Disp: 30 capsule, Rfl: 2    ziprasidone (Geodon) 60 MG capsule, Take 1 capsule by mouth Every Night., Disp: 30 capsule, Rfl: 2    Humira, 2 Pen, 40 MG/0.4ML Pen-injector Kit, INJECT 1 PEN UNDER THE SKIN EVERY 14 DAYS., Disp: 2 each, Rfl: 11    naloxone (NARCAN) 4 MG/0.1ML nasal spray, Call 911. Don't prime. [place] in 1 nostril for overdose. Repeat in 2-3 minutes in other nostril if no or minimal breathing/responsiveness., Disp: 2 each, Rfl: 0    oxyCODONE-acetaminophen (PERCOCET) 5-325 MG per tablet, Take 1 tablet by mouth Every 6 (Six) Hours As Needed for Severe Pain (Pain)., Disp: 20 tablet, Rfl: 0    polyethylene glycol (MIRALAX) 17 GM/SCOOP powder, Take 17 g by mouth Daily As Needed (Use if senna-docusate is ineffective)., Disp: 510 g, Rfl: 0    pregabalin (LYRICA) 150 MG capsule, Take 1 capsule by mouth 3 times a day., Disp: , Rfl:     sennosides-docusate (PERICOLACE) 8.6-50 MG per tablet, Take 2 tablets by mouth 2 (Two) Times a Day., Disp: 30 tablet, Rfl: 0  No current facility-administered medications for this visit.    Facility-Administered Medications Ordered in Other Visits:     mupirocin (BACTROBAN) 2 % nasal ointment, , Nasal, BID, Paul Caraballo MD    Objective     Physical Exam:  Vital Signs:   Vitals:    05/30/25 1644   BP: 120/89   Pulse: 80   Weight: 102 kg (224 lb)   Height: 162.6 cm (64\")     Body mass index is 38.45 kg/m².     Mental Status Exam:   Hygiene:   good  Cooperation:  Cooperative  Eye Contact:  Good  Psychomotor Behavior:  Appropriate  Affect:  Appropriate  Mood: normal  Speech:  Normal  Thought Process:  Goal directed  Thought Content:  Mood congruent  Suicidal:  None  Homicidal:  None  Hallucinations:  None  Delusion:  None  Memory:  Intact  Orientation:  Grossly " intact  Reliability:  good  Insight:  Good  Judgement:  Good  Impulse Control:  Good  Physical/Medical Issues:  Yes several medical: Parities, see chart     Assessment / Plan      Visit Diagnosis/Orders Placed This Visit:  Diagnoses and all orders for this visit:    1. Bipolar disorder, in full remission, most recent episode depressed  -     ziprasidone (Geodon) 60 MG capsule; Take 1 capsule by mouth Every Night.  Dispense: 30 capsule; Refill: 2  -     ziprasidone (GEODON) 40 MG capsule; Take 1 capsule by mouth Every Morning.  Dispense: 30 capsule; Refill: 2  -     lamoTRIgine (LaMICtal) 200 MG tablet; Take 1 tablet by mouth Daily.  Dispense: 90 tablet; Refill: 1    2. Chronic idiopathic facial pain  -     clonazePAM (KlonoPIN) 0.5 MG tablet; Take 1 tablet by mouth 2 (Two) Times a Day As Needed for Anxiety.  Dispense: 60 tablet; Refill: 0    3. Trigeminal neuralgia  -     clonazePAM (KlonoPIN) 0.5 MG tablet; Take 1 tablet by mouth 2 (Two) Times a Day As Needed for Anxiety.  Dispense: 60 tablet; Refill: 0         Differential:   N/A    Plan:   Will begin weaning off clonazepam.  Decrease to 0.5 mg twice daily.  Instructed patient to wean off as able.  Continue with all other psychotropics as prescribed.    Continue supportive psychotherapy efforts and medications as indicated. Treatment and medication options discussed during today's visit. Patient ackowledged and verbally consented to continue with current treatment plan and was educated on the importance of compliance with treatment and follow-up appointments. Patient seems reasonably able to adhere to treatment plan.      Medication Considerations:  Discussed medication options and treatment plan of prescribed medication(s) as well as the risks, benefits, and potential side effects. Patient is agreeable to call the office with any worsening of symptoms or onset of side effects. Patient is agreeable to call 911 or go to the nearest ER should he/she begin having  SI/HI.    Quality Measures:   Never smoker    I advised Corrine Eaton of the risks of tobacco use.     Follow Up:   Return in about 3 months (around 8/30/2025) for Recheck.      SCHUYLER Hawkins, PMHNP-BC      *Part of this note may be an electronic transcription/translation of spoken language to printed text using the Dragon Dictation System.

## 2025-06-03 ENCOUNTER — TELEPHONE (OUTPATIENT)
Dept: GASTROENTEROLOGY | Facility: CLINIC | Age: 35
End: 2025-06-03
Payer: COMMERCIAL

## 2025-06-03 DIAGNOSIS — K50.90 CROHN'S DISEASE WITHOUT COMPLICATION, UNSPECIFIED GASTROINTESTINAL TRACT LOCATION: Primary | ICD-10-CM

## 2025-06-03 NOTE — TELEPHONE ENCOUNTER
Dr Estrella,    Ms Uma called requesting Humira antibody test so she can start back on her Humira as recommended in April after surgery. Please advise.

## 2025-06-04 ENCOUNTER — LAB (OUTPATIENT)
Dept: LAB | Facility: HOSPITAL | Age: 35
End: 2025-06-04
Payer: COMMERCIAL

## 2025-06-04 DIAGNOSIS — K50.90 CROHN'S DISEASE WITHOUT COMPLICATION, UNSPECIFIED GASTROINTESTINAL TRACT LOCATION: ICD-10-CM

## 2025-06-04 LAB
ALBUMIN SERPL-MCNC: 4.3 G/DL (ref 3.5–5.2)
ALBUMIN/GLOB SERPL: 1.3 G/DL
ALP SERPL-CCNC: 69 U/L (ref 39–117)
ALT SERPL W P-5'-P-CCNC: 13 U/L (ref 1–33)
ANION GAP SERPL CALCULATED.3IONS-SCNC: 10.7 MMOL/L (ref 5–15)
AST SERPL-CCNC: 13 U/L (ref 1–32)
BILIRUB SERPL-MCNC: 0.3 MG/DL (ref 0–1.2)
BUN SERPL-MCNC: 10 MG/DL (ref 6–20)
BUN/CREAT SERPL: 10.5 (ref 7–25)
CALCIUM SPEC-SCNC: 9.6 MG/DL (ref 8.6–10.5)
CHLORIDE SERPL-SCNC: 103 MMOL/L (ref 98–107)
CO2 SERPL-SCNC: 25.3 MMOL/L (ref 22–29)
CREAT SERPL-MCNC: 0.95 MG/DL (ref 0.57–1)
CRP SERPL-MCNC: 1.08 MG/DL (ref 0–0.5)
DEPRECATED RDW RBC AUTO: 41.2 FL (ref 37–54)
EGFRCR SERPLBLD CKD-EPI 2021: 80.3 ML/MIN/1.73
ERYTHROCYTE [DISTWIDTH] IN BLOOD BY AUTOMATED COUNT: 12.2 % (ref 12.3–15.4)
GLOBULIN UR ELPH-MCNC: 3.3 GM/DL
GLUCOSE SERPL-MCNC: 112 MG/DL (ref 65–99)
HCT VFR BLD AUTO: 42.6 % (ref 34–46.6)
HGB BLD-MCNC: 14.2 G/DL (ref 12–15.9)
MCH RBC QN AUTO: 30.9 PG (ref 26.6–33)
MCHC RBC AUTO-ENTMCNC: 33.3 G/DL (ref 31.5–35.7)
MCV RBC AUTO: 92.6 FL (ref 79–97)
PLATELET # BLD AUTO: 267 10*3/MM3 (ref 140–450)
PMV BLD AUTO: 9.9 FL (ref 6–12)
POTASSIUM SERPL-SCNC: 4.2 MMOL/L (ref 3.5–5.2)
PROT SERPL-MCNC: 7.6 G/DL (ref 6–8.5)
RBC # BLD AUTO: 4.6 10*6/MM3 (ref 3.77–5.28)
SODIUM SERPL-SCNC: 139 MMOL/L (ref 136–145)
WBC NRBC COR # BLD AUTO: 10.53 10*3/MM3 (ref 3.4–10.8)

## 2025-06-04 PROCEDURE — 80145 DRUG ASSAY ADALIMUMAB: CPT

## 2025-06-04 PROCEDURE — 80053 COMPREHEN METABOLIC PANEL: CPT

## 2025-06-04 PROCEDURE — 36415 COLL VENOUS BLD VENIPUNCTURE: CPT

## 2025-06-04 PROCEDURE — 82397 CHEMILUMINESCENT ASSAY: CPT

## 2025-06-04 PROCEDURE — 85027 COMPLETE CBC AUTOMATED: CPT

## 2025-06-04 PROCEDURE — 86140 C-REACTIVE PROTEIN: CPT

## 2025-06-05 ENCOUNTER — RESULTS FOLLOW-UP (OUTPATIENT)
Dept: GASTROENTEROLOGY | Facility: CLINIC | Age: 35
End: 2025-06-05
Payer: COMMERCIAL

## 2025-06-05 NOTE — LETTER
Corrine Eaton  [address] [place] [address]    June 5, 2025     Dear Ms. Eaton:    Below are the results from your recent visit:    Resulted Orders   C-reactive Protein   Result Value Ref Range    C-Reactive Protein 1.08 (H) 0.00 - 0.50 mg/dL   Comprehensive Metabolic Panel   Result Value Ref Range    Glucose 112 (H) 65 - 99 mg/dL    BUN 10.0 6.0 - 20.0 mg/dL    Creatinine 0.95 0.57 - 1.00 mg/dL    Sodium 139 136 - 145 mmol/L    Potassium 4.2 3.5 - 5.2 mmol/L    Chloride 103 98 - 107 mmol/L    CO2 25.3 22.0 - 29.0 mmol/L    Calcium 9.6 8.6 - 10.5 mg/dL    Total Protein 7.6 6.0 - 8.5 g/dL    Albumin 4.3 3.5 - 5.2 g/dL    ALT (SGPT) 13 1 - 33 U/L    AST (SGOT) 13 1 - 32 U/L    Alkaline Phosphatase 69 39 - 117 U/L    Total Bilirubin 0.3 0.0 - 1.2 mg/dL    Globulin 3.3 gm/dL    A/G Ratio 1.3 g/dL    BUN/Creatinine Ratio 10.5 7.0 - 25.0    Anion Gap 10.7 5.0 - 15.0 mmol/L    eGFR 80.3 >60.0 mL/min/1.73   CBC (No Diff)   Result Value Ref Range    WBC 10.53 3.40 - 10.80 10*3/mm3    RBC 4.60 3.77 - 5.28 10*6/mm3    Hemoglobin 14.2 12.0 - 15.9 g/dL    Hematocrit 42.6 34.0 - 46.6 %    MCV 92.6 79.0 - 97.0 fL    MCH 30.9 26.6 - 33.0 pg    MCHC 33.3 31.5 - 35.7 g/dL    RDW 12.2 (L) 12.3 - 15.4 %    RDW-SD 41.2 37.0 - 54.0 fl    MPV 9.9 6.0 - 12.0 fL    Platelets 267 140 - 450 10*3/mm3       Labs show mild elevation in inflammatory marker but remainder of labs look good.             Sincerely,        Santy Estrella MD

## 2025-06-11 ENCOUNTER — OFFICE VISIT (OUTPATIENT)
Age: 35
End: 2025-06-11
Payer: COMMERCIAL

## 2025-06-11 VITALS
WEIGHT: 226.9 LBS | SYSTOLIC BLOOD PRESSURE: 110 MMHG | HEIGHT: 64 IN | DIASTOLIC BLOOD PRESSURE: 68 MMHG | TEMPERATURE: 97.7 F | BODY MASS INDEX: 38.74 KG/M2

## 2025-06-11 DIAGNOSIS — M79.7 FIBROMYALGIA: Primary | Chronic | ICD-10-CM

## 2025-06-11 PROBLEM — R76.8 POSITIVE ANA (ANTINUCLEAR ANTIBODY): Status: RESOLVED | Noted: 2025-01-13 | Resolved: 2025-06-11

## 2025-06-11 NOTE — ASSESSMENT & PLAN NOTE
Medication/treatment/Interventions tried include: Tylenol, gabapentin, Adalimumab, She has seen gastroenterology, she has seen neurology, she has seen behavioral health specialists, Colon resection, Tramadol, Oxcarbazepine, carbamazepine, Motrin, She tries to limit/avoid oral NSAIDS Due to history of inflammatory bowel disease, Budesonide, prednisone, Citalopram, mesalamine , Trintellix, Botox injections, pregabalin (Lyrica), She has seen neurosurgery, she has had surgery for trigeminal neuralgia    1. H & P consistent with this diagnosis.   2. Encourage aerobic activity and sleep hygiene.  3. If she has not had a sleep study/consultation consider getting this done.   4. Tylenol PRN is ok as directed  5. She tries to avoid oral NSAIDS due to history of inflammatory bowel disease.   6. She has seen neurology  7. She has taken gabapentin   8. She has taken Tramadol  9. She has a history of Bipolar.   She has seen mental health specialists.   She is on treatment for this. I defer this to her mental health specialist.   I would not add anything like duloxetine on my own here. Antidepressants can sometimes trigger manic episodes.   10. She has taken medication like oxcarbazepine and carbamazepine.   11. Her neurologist now has her taking Lyrica.   12. We gave her a handout on fibromyalgia to review.   13. She has had surgery for trigeminal neuralgia.   14. She has seen neurosurgery.   15. Today she asked for a handicap placard.   I filled out the form as requested and returned it to her.   16. Follow up with me as needed.

## 2025-06-11 NOTE — PROGRESS NOTES
Office Follow Up      Date: 06/11/2025   Patient Name: Corrine Eaton  MRN: [id number]  YOB: 1990    Referring Physician: No ref. provider found     Chief Complaint   Patient presents with    Fibromyalgia     Follow up       History of Present Illness: Corrine Eaton is a 34 y.o. female who is here today for follow up. Her neurologist now has her on pregabalin (Lyrica). She is on Humira for inflammatory bowel disease.     She rates her pain as 2/10 in severity. She has 20 minutes/day of morning stiffness. She has back and neck pain. Her neck is stiff. She has muscle pain. No muscle weakness. No swelling. She has trouble walking.     No sicca symptoms. No shortness of breath or chest pain. She has diarrhea. No  issues. No rash. No hair loss. No lymphadenopathy. No abnormal bruising/bleeding. She does not sleep well. No headaches or paresthesias.       Subjective     Review of Systems   Constitutional:  Positive for fatigue.   HENT: Negative.     Eyes: Negative.    Respiratory: Negative.     Cardiovascular: Negative.    Gastrointestinal:  Positive for abdominal distention, abdominal pain and diarrhea.   Endocrine: Negative.    Genitourinary: Negative.    Musculoskeletal:  Positive for arthralgias, back pain, gait problem, myalgias, neck pain and neck stiffness.   Skin: Negative.    Allergic/Immunologic: Positive for immunocompromised state.   Hematological: Negative.    Psychiatric/Behavioral: Negative.     All other systems reviewed and are negative.         Current Outpatient Medications:     clonazePAM (KlonoPIN) 0.5 MG tablet, Take 1 tablet by mouth 2 (Two) Times a Day As Needed for Anxiety., Disp: 60 tablet, Rfl: 0    Humira, 2 Pen, 40 MG/0.4ML Pen-injector Kit, INJECT 1 PEN UNDER THE SKIN EVERY 14 DAYS., Disp: 2 each, Rfl: 11    lamoTRIgine (LaMICtal) 200 MG tablet, Take 1 tablet by mouth Daily., Disp: 90 tablet, Rfl: 1    naloxone (NARCAN) 4 MG/0.1ML nasal spray,  "Call 911. Don't prime. Sparkman in 1 nostril for overdose. Repeat in 2-3 minutes in other nostril if no or minimal breathing/responsiveness., Disp: 2 each, Rfl: 0    oxyCODONE-acetaminophen (PERCOCET) 5-325 MG per tablet, Take 1 tablet by mouth Every 6 (Six) Hours As Needed for Severe Pain (Pain)., Disp: 20 tablet, Rfl: 0    polyethylene glycol (MIRALAX) 17 GM/SCOOP powder, Take 17 g by mouth Daily As Needed (Use if senna-docusate is ineffective)., Disp: 510 g, Rfl: 0    pregabalin (LYRICA) 150 MG capsule, Take 1 capsule by mouth 3 times a day., Disp: , Rfl:     sennosides-docusate (PERICOLACE) 8.6-50 MG per tablet, Take 2 tablets by mouth 2 (Two) Times a Day., Disp: 30 tablet, Rfl: 0    ziprasidone (GEODON) 40 MG capsule, Take 1 capsule by mouth Every Morning., Disp: 30 capsule, Rfl: 2    ziprasidone (Geodon) 60 MG capsule, Take 1 capsule by mouth Every Night., Disp: 30 capsule, Rfl: 2  No current facility-administered medications for this visit.    Facility-Administered Medications Ordered in Other Visits:     mupirocin (BACTROBAN) 2 % nasal ointment, , Nasal, BID, Paul Caraballo MD    Allergies   Allergen Reactions    Imitrex [Sumatriptan] Headache     Made migraine worse       I have reviewed and updated the patient's chief complaint, history of present illness, review of systems, past medical history, surgical history, family history, social history, medications and allergy list as appropriate.     Objective      Vitals:    06/11/25 1630   BP: 110/68   BP Location: Right arm   Patient Position: Sitting   Cuff Size: Adult   Temp: 97.7 °F (36.5 °C)   Weight: 103 kg (226 lb 14.4 oz)   Height: 162.6 cm (64.02\")   PainSc: 2      Body mass index is 38.93 kg/m².      Physical Exam   General: Well appearing 34 year old  female. Not in distress. She is ambulating unassisted.   SKIN: No rashes. No alopecia. No subcutaneous nodules. No digital pits or ulcers. No sclerodactyly.   HEENT: NCAT. Conjunctiva clear, no " photophobia. No oral or nasal ulcers. Hearing intact.    Pulmonary: Clear to auscultation bilaterally. No wheezing, rales, or rhonchi.  CV: Regular rate and rhythm. No murmurs, rubs, or gallops.   Psych: Normal mood and affect. Alert and oriented x 3.   Extremities: No cyanosis or edema.   Musculoskeletal: No joint swelling or tenderness to palpation. No warmth or erythema. Normal range of motion of the wrists, ankles, elbows, and knees.   Lymph: No palpable cervical adenopathy         Procedures    Assessment / Plan      Assessment & Plan  Fibromyalgia  Medication/treatment/Interventions tried include: Tylenol, gabapentin, Adalimumab, She has seen gastroenterology, she has seen neurology, she has seen behavioral health specialists, Colon resection, Tramadol, Oxcarbazepine, carbamazepine, Motrin, She tries to limit/avoid oral NSAIDS Due to history of inflammatory bowel disease, Budesonide, prednisone, Citalopram, mesalamine , Trintellix, Botox injections, pregabalin (Lyrica), She has seen neurosurgery, she has had surgery for trigeminal neuralgia    1. H & P consistent with this diagnosis.   2. Encourage aerobic activity and sleep hygiene.  3. If she has not had a sleep study/consultation consider getting this done.   4. Tylenol PRN is ok as directed  5. She tries to avoid oral NSAIDS due to history of inflammatory bowel disease.   6. She has seen neurology  7. She has taken gabapentin   8. She has taken Tramadol  9. She has a history of Bipolar.   She has seen mental health specialists.   She is on treatment for this. I defer this to her mental health specialist.   I would not add anything like duloxetine on my own here. Antidepressants can sometimes trigger manic episodes.   10. She has taken medication like oxcarbazepine and carbamazepine.   11. Her neurologist now has her taking Lyrica.   12. We gave her a handout on fibromyalgia to review.   13. She has had surgery for trigeminal neuralgia.   14. She has seen  neurosurgery.   15. Today she asked for a handicap placard.   I filled out the form as requested and returned it to her.   16. Follow up with me as needed.          Follow Up:   Return if symptoms worsen or fail to improve.      Vasiliy Leary, DO  [facility] [place]

## 2025-06-16 LAB
ADALIMUMAB AB SERPL-MCNC: <25 NG/ML
ADALIMUMAB SERPL-MCNC: 2.1 UG/ML

## 2025-06-24 DIAGNOSIS — K50.90 CROHN'S DISEASE WITHOUT COMPLICATION, UNSPECIFIED GASTROINTESTINAL TRACT LOCATION: ICD-10-CM

## 2025-06-24 NOTE — TELEPHONE ENCOUNTER
Rx Refill Note  Requested Prescriptions     Pending Prescriptions Disp Refills    Humira, 2 Pen, 40 MG/0.4ML Auto-injector Kit [Pharmacy Med Name: HUMIRA PEN (2/BOX) 40MG/0.4ML] 2 each 11     Sig: INJECT 1 PEN UNDER THE SKIN EVERY 14 DAYS.      Last office visit with prescribing clinician: 4/14/2025   Last telemedicine visit with prescribing clinician: Visit date not found   Next office visit with prescribing clinician: 10/14/2025                             Jacy Bashir MA  06/24/25, 08:04 EDT

## 2025-06-25 ENCOUNTER — OFFICE VISIT (OUTPATIENT)
Dept: NEUROSURGERY | Facility: CLINIC | Age: 35
End: 2025-06-25
Payer: COMMERCIAL

## 2025-06-25 VITALS — RESPIRATION RATE: 19 BRPM | BODY MASS INDEX: 38.76 KG/M2 | WEIGHT: 227 LBS | HEIGHT: 64 IN | TEMPERATURE: 98.7 F

## 2025-06-25 DIAGNOSIS — G50.0 TRIGEMINAL NEURALGIA OF RIGHT SIDE OF FACE: Primary | ICD-10-CM

## 2025-06-25 PROCEDURE — 99024 POSTOP FOLLOW-UP VISIT: CPT | Performed by: NEUROLOGICAL SURGERY

## 2025-06-25 RX ORDER — ADALIMUMAB 40MG/0.4ML
KIT SUBCUTANEOUS
Qty: 2 EACH | Refills: 11 | Status: SHIPPED | OUTPATIENT
Start: 2025-06-25

## 2025-06-25 NOTE — PROGRESS NOTES
Patient: Corrine Eaton  : 1990    Primary Care Provider: Rukhsana Hilliard DO    Requesting Provider: As above        History    Chief Complaint: Right facial pain.    History of Present Illness: Ms. Eaton is a 34-year-old woman who presented with a protracted course of right facial pain.  On 2025 she underwent microvascular decompression.  She has done great.  Her Tegretol has been discontinued.  She still remains on Lyrica.  She is not really having any pain at this point.  She is pleased with her progress.    Review of Systems   Constitutional:  Negative for activity change, appetite change, chills, diaphoresis, fatigue, fever and unexpected weight change.   HENT:  Negative for congestion, dental problem, drooling, ear discharge, ear pain, facial swelling, hearing loss, mouth sores, nosebleeds, postnasal drip, rhinorrhea, sinus pressure, sneezing, sore throat, tinnitus, trouble swallowing and voice change.    Eyes:  Negative for photophobia, pain, discharge, redness, itching and visual disturbance.   Respiratory:  Negative for apnea, cough, choking, chest tightness, shortness of breath, wheezing and stridor.    Cardiovascular:  Negative for chest pain, palpitations and leg swelling.   Gastrointestinal:  Negative for abdominal distention, abdominal pain, anal bleeding, blood in stool, constipation, diarrhea, nausea, rectal pain and vomiting.   Endocrine: Negative for cold intolerance, heat intolerance, polydipsia, polyphagia and polyuria.   Genitourinary:  Negative for decreased urine volume, difficulty urinating, dysuria, enuresis, flank pain, frequency, genital sores, hematuria and urgency.   Musculoskeletal:  Negative for arthralgias, back pain, gait problem, joint swelling, myalgias, neck pain and neck stiffness.   Skin:  Negative for color change, pallor, rash and wound.   Allergic/Immunologic: Negative for environmental allergies, food allergies and immunocompromised state.  "  Neurological:  Negative for dizziness, tremors, seizures, syncope, facial asymmetry, speech difficulty, weakness, light-headedness, numbness and headaches.   Hematological:  Negative for adenopathy. Does not bruise/bleed easily.   Psychiatric/Behavioral:  Negative for agitation, behavioral problems, confusion, decreased concentration, dysphoric mood, hallucinations, self-injury, sleep disturbance and suicidal ideas. The patient is not nervous/anxious and is not hyperactive.    All other systems reviewed and are negative.      The patient's past medical history, past surgical history, family history, and social history have been reviewed at length in the electronic medical record.      Physical Exam:   Temp 98.7 °F (37.1 °C) (Infrared)   Resp 19   Ht 162.6 cm (64.02\")   Wt 103 kg (227 lb)   BMI 38.94 kg/m²   Right suboccipital incision looks great.  The patient is in very good spirits.    Medical Decision Making      Diagnosis:   Right trigeminal neuralgia status post microvascular decompression.    Treatment Options:   She continues on Lyrica.  I have recommended that that be very slowly tapered.  At this point we will see her on an as-needed basis.      Scribed for Paul Caraballo MD by Jessica Anderson CMA on 6/25/2025 11:30 EDT       I, Dr. Caraballo, personally performed the services described in the documentation, as scribed in my presence, and it is both accurate and complete.  "

## 2025-07-11 ENCOUNTER — PATIENT MESSAGE (OUTPATIENT)
Dept: FAMILY MEDICINE CLINIC | Facility: CLINIC | Age: 35
End: 2025-07-11
Payer: COMMERCIAL

## 2025-07-15 ENCOUNTER — TELEPHONE (OUTPATIENT)
Dept: BEHAVIORAL HEALTH | Facility: CLINIC | Age: 35
End: 2025-07-15
Payer: COMMERCIAL

## 2025-07-15 NOTE — TELEPHONE ENCOUNTER
Pt informed that I had misinformed her. Dr Hilliard does not manage her mood medications, so she would need to speak with her behavior health provider to discuss a letter.

## 2025-07-15 NOTE — TELEPHONE ENCOUNTER
Pt requesting an ENOCH letter for her dog Angie so that she can take her into the office for emotional support. Please advise

## 2025-07-15 NOTE — LETTER
[facility]  [address]  [address]  [place] [address]  [phone]  Dept: [phone]    07/15/25    RE:   Patient Name:  Corrine Eaton   :  1990      To whom it may concern,    Corrine is my patient, and has been under my care since 2020.  I am familiar with her history and with the functional limitations imposed by her disability.  She meets the definition of disability under the Americans with Disabilities Act, the Fair Housing Act, and the Rehabilitation Act of 1973.     Due to mental illness, Corrine has certain limitations regarding social anxiety, dealing with social situations, including the workplace, and emotional regulation related to stress management. In order to help alleviate these difficulties, and to enhance his/her ability to live independently and to fully use and enjoy in her personal dwelling and work place, I am prescribing an service animal, her dog named Angie, that will assist Crorine in coping with her disability.    I would be happy to answer other questions you may have concerning my recommendation that Corrine have an emotional support animal.  Should you have additional questions, please do not hesitate to contact me.    Sincerely,      Justin Chavez, MSN, APRN, PMHNP-BC

## 2025-07-15 NOTE — LETTER
[facility]  [address]  [address]  [place] [address]  [phone]  Dept: [phone]    07/15/25    RE:   Patient Name:  Corrine Eaton   :  1990      To whom it may concern,    Corrine is my patient, and has been under my care since 2020 for.  I am familiar with her history and with the functional limitations imposed by her disability.  She meets the definition of disability under the Americans with Disabilities Act, the Fair Housing Act, and the Rehabilitation Act of 1973.     Due to mental illness, Corrine has certain limitations regarding social anxiety, dealing with social situations, including the workplace, and emotional regulation related to stress management.  In order to help alleviate these difficulties, and to enhance his/her ability to live independently and to fully use and enjoy in her personal dwelling and work place, I am prescribing an emotional support animal, her dog named Angie, that will assist Corrine in coping with her disability.    I would be happy to answer other questions you may have concerning my recommendation that Corrine have an emotional support animal.  Should you have additional questions, please do not hesitate to contact me.    Sincerely,      Justin Chavez, MSN, APRN, PMHNP-BC

## 2025-07-30 ENCOUNTER — OFFICE VISIT (OUTPATIENT)
Dept: BEHAVIORAL HEALTH | Facility: CLINIC | Age: 35
End: 2025-07-30
Payer: COMMERCIAL

## 2025-07-30 VITALS
WEIGHT: 227 LBS | HEART RATE: 76 BPM | HEIGHT: 64 IN | BODY MASS INDEX: 38.76 KG/M2 | SYSTOLIC BLOOD PRESSURE: 142 MMHG | DIASTOLIC BLOOD PRESSURE: 85 MMHG

## 2025-07-30 DIAGNOSIS — F31.76 BIPOLAR DISORDER, IN FULL REMISSION, MOST RECENT EPISODE DEPRESSED: Primary | ICD-10-CM

## 2025-07-30 DIAGNOSIS — G47.00 INSOMNIA, UNSPECIFIED TYPE: ICD-10-CM

## 2025-07-30 RX ORDER — TRAZODONE HYDROCHLORIDE 50 MG/1
25-100 TABLET ORAL NIGHTLY
Qty: 60 TABLET | Refills: 2 | Status: SHIPPED | OUTPATIENT
Start: 2025-07-30

## 2025-07-30 RX ORDER — ZIPRASIDONE HYDROCHLORIDE 40 MG/1
40 CAPSULE ORAL EVERY MORNING
Qty: 90 CAPSULE | Refills: 1 | Status: SHIPPED | OUTPATIENT
Start: 2025-07-30

## 2025-07-30 RX ORDER — ZIPRASIDONE HYDROCHLORIDE 60 MG/1
60 CAPSULE ORAL NIGHTLY
Qty: 90 CAPSULE | Refills: 1 | Status: SHIPPED | OUTPATIENT
Start: 2025-07-30

## 2025-07-30 RX ORDER — LAMOTRIGINE 200 MG/1
200 TABLET ORAL DAILY
Qty: 90 TABLET | Refills: 1 | Status: SHIPPED | OUTPATIENT
Start: 2025-07-30

## 2025-07-30 NOTE — PROGRESS NOTES
"     Office  Follow Up Visit      Patient Name: Corrine Eaton  : 1990   MRN: [id number]     Referring Provider: Rukhsana Hilliard DO    Chief Complaint: Medication follow-up    ICD-10-CM ICD-9-CM   1. Bipolar disorder, in full remission, most recent episode depressed  F31.76 296.56   2. Insomnia, unspecified type  G47.00 780.52        History of Present Illness:   Corrine Eaton is a 34 y.o. female who is here today for follow up related to medication management of bipolar disorder, most recent episode depression.  Medication regimen includes ziprasidone 60 mg nightly, ziprasidone 40 mg every morning, and lamotrigine 200 mg daily.    Patient reports continued remission of all depressive symptoms and stable mood.  Reports no adverse effects with her medication regimen.  She reports continued remission of all trigeminal neuralgic pain.  She is no longer on any pain medication and has discontinued clonazepam use.  Patient is a low concern at this time is periodic insomnia.  She reports at times she finds it very difficult to fall asleep \"shut my mind off.\"  She reports that she has tried many things over-the-counter and nothing seems to help.  She also reports that she has a history of having difficulty falling asleep at times.      Subjective      Review of Systems:   Review of Systems   Constitutional: Negative.    Respiratory: Negative.     Cardiovascular: Negative.    Gastrointestinal: Negative.    Genitourinary: Negative.    Musculoskeletal: Negative.    Neurological: Negative.    Psychiatric/Behavioral:  Positive for sleep disturbance.        Patient History:   The following portions of the patient's history were reviewed and updated as appropriate: allergies, current medications, past family history, past medical history, past social history, past surgical history and problem list.     Social:  No change in interval history.    Medications:     Current Outpatient Medications:     lamoTRIgine " "(LaMICtal) 200 MG tablet, Take 1 tablet by mouth Daily., Disp: 90 tablet, Rfl: 1    ziprasidone (GEODON) 40 MG capsule, Take 1 capsule by mouth Every Morning., Disp: 90 capsule, Rfl: 1    ziprasidone (Geodon) 60 MG capsule, Take 1 capsule by mouth Every Night., Disp: 90 capsule, Rfl: 1    Humira, 2 Pen, 40 MG/0.4ML Auto-injector Kit, INJECT 1 PEN UNDER THE SKIN EVERY 14 DAYS., Disp: 2 each, Rfl: 11    pregabalin (LYRICA) 150 MG capsule, Take 1 capsule by mouth 3 times a day., Disp: , Rfl:     traZODone (DESYREL) 50 MG tablet, Take 0.5-2 tablets by mouth Every Night., Disp: 60 tablet, Rfl: 2  No current facility-administered medications for this visit.    Facility-Administered Medications Ordered in Other Visits:     mupirocin (BACTROBAN) 2 % nasal ointment, , Nasal, BID, Paul Caraballo MD    Objective     Physical Exam:  Vital Signs:   Vitals:    07/30/25 1432   BP: 142/85   Pulse: 76   Weight: 103 kg (227 lb)   Height: 162.6 cm (64\")     Body mass index is 38.96 kg/m².     Mental Status Exam:   Hygiene:   good  Cooperation:  Cooperative  Eye Contact:  Good  Psychomotor Behavior:  Appropriate  Affect:  Appropriate  Mood: normal  Speech:  Normal  Thought Process:  Goal directed  Thought Content:  Normal  Suicidal:  None  Homicidal:  None  Hallucinations:  None  Delusion:  None  Memory:  Intact  Orientation:  Grossly intact  Reliability:  good  Insight:  Good  Judgement:  Good  Impulse Control:  Good  Physical/Medical Issues:  Yes    Assessment / Plan      Visit Diagnosis/Orders Placed This Visit:  Diagnoses and all orders for this visit:    1. Bipolar disorder, in full remission, most recent episode depressed (Primary)  -     ziprasidone (Geodon) 60 MG capsule; Take 1 capsule by mouth Every Night.  Dispense: 90 capsule; Refill: 1  -     ziprasidone (GEODON) 40 MG capsule; Take 1 capsule by mouth Every Morning.  Dispense: 90 capsule; Refill: 1  -     lamoTRIgine (LaMICtal) 200 MG tablet; Take 1 tablet by mouth " Daily.  Dispense: 90 tablet; Refill: 1    2. Insomnia, unspecified type  -     traZODone (DESYREL) 50 MG tablet; Take 0.5-2 tablets by mouth Every Night.  Dispense: 60 tablet; Refill: 2         Differential:   N/A    Plan:   Initiate trazodone 25 to 100 mg nightly as needed.  Continue all other psychotropics as prescribed.    Continue supportive psychotherapy efforts and medications as indicated. Treatment and medication options discussed during today's visit. Patient ackowledged and verbally consented to continue with current treatment plan and was educated on the importance of compliance with treatment and follow-up appointments. Patient seems reasonably able to adhere to treatment plan.      Medication Considerations:  Discussed medication options and treatment plan of prescribed medication(s) as well as the risks, benefits, and potential side effects. Patient is agreeable to call the office with any worsening of symptoms or onset of side effects. Patient is agreeable to call 911 or go to the nearest ER should he/she begin having SI/HI.    Quality Measures:   Never smoker    I advised Corrine Eaton of the risks of tobacco use.     Follow Up:   Return in about 6 months (around 1/30/2026) for Recheck.      SCHUYLER Hawkins, PMHNP-BC      *Part of this note may be an electronic transcription/translation of spoken language to printed text using the Dragon Dictation System.

## (undated) DEVICE — BATRY SCRWDRVR PRECHARGED LITH 1P/U STRL

## (undated) DEVICE — TOOL MR8-7TA11 MR8 7CM TAPER 1.1MM: Brand: MIDAS REX MR8

## (undated) DEVICE — GLV SURG PREMIERPRO MIC LTX PF SZ6.5 BRN

## (undated) DEVICE — SPNG GZ WOVN 4X4IN 12PLY 10/BX STRL

## (undated) DEVICE — PENCL ROCKRSWCH MEGADYNE W/HOLSTR 10FT SS

## (undated) DEVICE — ANTIBACTERIAL UNDYED BRAIDED (POLYGLACTIN 910), SYNTHETIC ABSORBABLE SUTURE: Brand: COATED VICRYL

## (undated) DEVICE — PAD GRND REM POLYHESIVE A/ DISP

## (undated) DEVICE — PATIENT RETURN ELECTRODE, SINGLE-USE, CONTACT QUALITY MONITORING, ADULT, WITH 9FT CORD, FOR PATIENTS WEIGING OVER 33LBS. (15KG): Brand: MEGADYNE

## (undated) DEVICE — PERFORATOR CRANI 14MM 11MM

## (undated) DEVICE — APPL DURAPREP IODOPHOR APL 26ML

## (undated) DEVICE — SUT ETHLN 3/0 FS1 30IN 669H

## (undated) DEVICE — DRV BATRY MATRIXPRO STRL

## (undated) DEVICE — SUT VIC PLS CTD ANTIB BR 3/0 8/18IN 45CM

## (undated) DEVICE — ELECTRD BLD EZ CLN STD 2.5IN

## (undated) DEVICE — TOOL MR8-9AC60M MR8 9CM ACORN 6MM 2FLT: Brand: MIDAS REX MR8

## (undated) DEVICE — DISPOSABLE IRRIGATION BIPOLAR CORD, M1000 TYPE: Brand: KIRWAN

## (undated) DEVICE — SHEET, DRAPE, SPLIT, STERILE: Brand: MEDLINE

## (undated) DEVICE — CONN TBG Y 5 IN 1 LF STRL

## (undated) DEVICE — NEURO SPONGES: Brand: DEROYAL

## (undated) DEVICE — PK CRANI 10

## (undated) DEVICE — DRP MICROSCOPE 4 BINOCULAR CV 54X150IN

## (undated) DEVICE — TOOL MR8-F2/7TA23 MR8 F2/7CM TAPER 2.3MM: Brand: MIDAS REX MR8

## (undated) DEVICE — WIPE 2MM

## (undated) DEVICE — GLV SURG PREMIERPRO MIC LTX PF SZ7 BRN

## (undated) DEVICE — 3M™ MEDIPORE™ H SOFT CLOTH SURGICAL TAPE, 2863, 3 IN X 10 YD, 12/CASE: Brand: 3M™ MEDIPORE™

## (undated) DEVICE — GLV SURG PREMIERPRO MIC LTX PF SZ7.5 BRN